# Patient Record
Sex: FEMALE | Race: WHITE | Employment: OTHER | URBAN - METROPOLITAN AREA
[De-identification: names, ages, dates, MRNs, and addresses within clinical notes are randomized per-mention and may not be internally consistent; named-entity substitution may affect disease eponyms.]

---

## 2017-02-24 ENCOUNTER — LAB ONLY (OUTPATIENT)
Dept: INTERNAL MEDICINE CLINIC | Age: 72
End: 2017-02-24

## 2017-02-24 ENCOUNTER — HOSPITAL ENCOUNTER (OUTPATIENT)
Dept: LAB | Age: 72
Discharge: HOME | End: 2017-02-24
Payer: MEDICARE

## 2017-02-24 DIAGNOSIS — E78.5 HYPERLIPIDEMIA LDL GOAL <100: ICD-10-CM

## 2017-02-24 DIAGNOSIS — E78.5 HYPERLIPIDEMIA LDL GOAL <100: Primary | ICD-10-CM

## 2017-02-24 LAB
ALBUMIN SERPL BCP-MCNC: 4 G/DL (ref 3.4–5)
ALBUMIN/GLOB SERPL: 1.3 {RATIO} (ref 0.8–1.7)
ALP SERPL-CCNC: 101 U/L (ref 45–117)
ALT SERPL-CCNC: 31 U/L (ref 13–56)
ANION GAP BLD CALC-SCNC: 6 MMOL/L (ref 3–18)
AST SERPL W P-5'-P-CCNC: 21 U/L (ref 15–37)
BILIRUB SERPL-MCNC: 0.9 MG/DL (ref 0.2–1)
BUN SERPL-MCNC: 23 MG/DL (ref 7–18)
BUN/CREAT SERPL: 29 (ref 12–20)
CALCIUM SERPL-MCNC: 8.7 MG/DL (ref 8.5–10.1)
CHLORIDE SERPL-SCNC: 105 MMOL/L (ref 100–108)
CHOLEST SERPL-MCNC: 177 MG/DL
CO2 SERPL-SCNC: 30 MMOL/L (ref 21–32)
CREAT SERPL-MCNC: 0.8 MG/DL (ref 0.6–1.3)
GLOBULIN SER CALC-MCNC: 3 G/DL (ref 2–4)
GLUCOSE SERPL-MCNC: 78 MG/DL (ref 74–99)
HDLC SERPL-MCNC: 77 MG/DL (ref 40–60)
HDLC SERPL: 2.3 {RATIO} (ref 0–5)
LDLC SERPL CALC-MCNC: 81.8 MG/DL (ref 0–100)
LIPID PROFILE,FLP: ABNORMAL
POTASSIUM SERPL-SCNC: 3.9 MMOL/L (ref 3.5–5.5)
PROT SERPL-MCNC: 7 G/DL (ref 6.4–8.2)
SODIUM SERPL-SCNC: 141 MMOL/L (ref 136–145)
TRIGL SERPL-MCNC: 91 MG/DL (ref ?–150)
VLDLC SERPL CALC-MCNC: 18.2 MG/DL

## 2017-02-24 PROCEDURE — 80053 COMPREHEN METABOLIC PANEL: CPT | Performed by: INTERNAL MEDICINE

## 2017-02-24 PROCEDURE — 80061 LIPID PANEL: CPT | Performed by: INTERNAL MEDICINE

## 2017-02-24 PROCEDURE — 36415 COLL VENOUS BLD VENIPUNCTURE: CPT | Performed by: INTERNAL MEDICINE

## 2017-03-07 ENCOUNTER — OFFICE VISIT (OUTPATIENT)
Dept: INTERNAL MEDICINE CLINIC | Age: 72
End: 2017-03-07

## 2017-03-07 ENCOUNTER — PATIENT OUTREACH (OUTPATIENT)
Dept: INTERNAL MEDICINE CLINIC | Age: 72
End: 2017-03-07

## 2017-03-07 VITALS
DIASTOLIC BLOOD PRESSURE: 70 MMHG | HEIGHT: 61 IN | OXYGEN SATURATION: 98 % | HEART RATE: 73 BPM | TEMPERATURE: 98.5 F | SYSTOLIC BLOOD PRESSURE: 122 MMHG | WEIGHT: 114.4 LBS | BODY MASS INDEX: 21.6 KG/M2 | RESPIRATION RATE: 12 BRPM

## 2017-03-07 DIAGNOSIS — F41.9 ANXIETY: ICD-10-CM

## 2017-03-07 DIAGNOSIS — M85.80 OSTEOPENIA: ICD-10-CM

## 2017-03-07 DIAGNOSIS — E78.5 HYPERLIPIDEMIA LDL GOAL <100: Primary | ICD-10-CM

## 2017-03-07 DIAGNOSIS — Z00.00 MEDICARE ANNUAL WELLNESS VISIT, SUBSEQUENT: ICD-10-CM

## 2017-03-07 DIAGNOSIS — E55.9 HYPOVITAMINOSIS D: ICD-10-CM

## 2017-03-07 DIAGNOSIS — Z13.5 GLAUCOMA SCREENING: ICD-10-CM

## 2017-03-07 DIAGNOSIS — I10 ESSENTIAL HYPERTENSION WITH GOAL BLOOD PRESSURE LESS THAN 140/90: ICD-10-CM

## 2017-03-07 DIAGNOSIS — Z78.9 ADVANCE DIRECTIVE ON FILE: ICD-10-CM

## 2017-03-07 RX ORDER — ALPRAZOLAM 0.25 MG/1
0.25 TABLET ORAL
COMMUNITY
End: 2017-03-07 | Stop reason: SDUPTHER

## 2017-03-07 RX ORDER — ALPRAZOLAM 0.25 MG/1
0.25 TABLET ORAL
Qty: 30 TAB | Refills: 0 | Status: SHIPPED | OUTPATIENT
Start: 2017-03-07 | End: 2017-11-01 | Stop reason: SDUPTHER

## 2017-03-07 NOTE — PROGRESS NOTES
Clarita Blanton,born 1945, is a 67 y.o. female, who is seen today for reevaluation of hyperlipidemia hypertension the patient hypovitaminosis D and osteopenia. She takes her calcium with D regularly and extra D. She takes all of her medicine correctly. She is having no chest pain dyspnea or other complaints. She does note that couple times per month when she feels fine her blood pressure will go up to around 160 and his right back down into the 120 range the next day. She has had a little stress after moving her mother here from [place] to independent living. Past Medical History:   Diagnosis Date    Allergic rhinitis     Cancer ([facility] [address]     colon    Carotid duplex study 02/08/2011    No significant stenosis >49% bilaterally.  Cold     currently with post nasal drip    Headache(784.0)     Hypercholesterolemia     Hypertension     Normal nuclear stress test 01/29/2009    No evidence of scarring or ischemia. EF 78%. No reg WMA. Neg max EST w/good exercise.  Osteopenia      Current Outpatient Prescriptions   Medication Sig Dispense Refill    ALPRAZolam (XANAX) 0.25 mg tablet Take 1 Tab by mouth three (3) times daily as needed for Anxiety. Max Daily Amount: 0.75 mg. 30 Tab 0    ipratropium (ATROVENT) 0.03 % nasal spray USE 2 SPRAYS IN EACH NOSTRIL EVERY 12 HOURS 60 mL 3    atorvastatin (LIPITOR) 10 mg tablet TAKE ONE TABLET BY MOUTH EVERY DAY 90 Tab 3    lisinopril (PRINIVIL, ZESTRIL) 40 mg tablet TAKE 1 TABLET TWICE DAILY 180 Tab 3    polyethylene glycol (MIRALAX) 17 gram/dose powder Take 17 g by mouth daily. 1530 g 3    cholecalciferol, vitamin D3, (VITAMIN D3) 2,000 unit Tab Take 1 Tab by mouth daily.  omega-3 fatty acids-vitamin e (FISH OIL) 1,000 mg Cap Take 1 Cap by mouth daily.  aspirin delayed-release 81 mg tablet Take 81 mg by mouth daily.  MULTIVITS,CA,MINERALS/IRON/FA (MULTI FOR HER PO) Take 1 Tab by mouth daily.       calcium-cholecalciferol, d3, (CALCIUM 600 + D) 600-125 mg-unit Tab Take 1 Tab by mouth daily. Visit Vitals    /70    Pulse 73    Temp 98.5 °F (36.9 °C) (Oral)    Resp 12    Ht 5' 1\" (1.549 m)    Wt 114 lb 6.4 oz (51.9 kg)    SpO2 98%    BMI 21.62 kg/m2     Carotids are 2+ without bruits. Lungs are clear to percussion. Good breath sounds with no wheezing or crackles. Heart reveals a regular rhythm with normal S1 and S2 no murmur gallop click or rub. Apical impulse is not palpable. Abdomen is soft and nontender with no hepatosplenomegaly or masses and no bruits. Extremities reveal no clubbing cyanosis or edema. Pulses are 2+ throughout. Results for orders placed or performed during the hospital encounter of 13/11/[birth year]   METABOLIC PANEL, COMPREHENSIVE   Result Value Ref Range    Sodium 141 136 - 145 mmol/L    Potassium 3.9 3.5 - 5.5 mmol/L    Chloride 105 100 - 108 mmol/L    CO2 30 21 - 32 mmol/L    Anion gap 6 3.0 - 18 mmol/L    Glucose 78 74 - 99 mg/dL    BUN 23 (H) 7.0 - 18 MG/DL    Creatinine 0.80 0.6 - 1.3 MG/DL    BUN/Creatinine ratio 29 (H) 12 - 20      GFR est AA >60 >60 ml/min/1.73m2    GFR est non-AA >60 >60 ml/min/1.73m2    Calcium 8.7 8.5 - 10.1 MG/DL    Bilirubin, total 0.9 0.2 - 1.0 MG/DL    ALT (SGPT) 31 13 - 56 U/L    AST (SGOT) 21 15 - 37 U/L    Alk. phosphatase 101 45 - 117 U/L    Protein, total 7.0 6.4 - 8.2 g/dL    Albumin 4.0 3.4 - 5.0 g/dL    Globulin 3.0 2.0 - 4.0 g/dL    A-G Ratio 1.3 0.8 - 1.7     LIPID PANEL   Result Value Ref Range    LIPID PROFILE          Cholesterol, total 177 <200 MG/DL    Triglyceride 91 <150 MG/DL    HDL Cholesterol 77 (H) 40 - 60 MG/DL    LDL, calculated 81.8 0 - 100 MG/DL    VLDL, calculated 18.2 MG/DL    CHOL/HDL Ratio 2.3 0 - 5.0       Assessment: #1. Hyperlipidemia doing well. She will continue atorvastatin 10 mg daily. #2.   Hypertension well controlled most of the time, she will continue monitoring blood pressure at home and call if blood pressure is high more frequently than now. Will continue lisinopril 40 mg daily. #3.  History of constipation doing well, she will continue polyethylene glycol. #4.  Osteopenia with hypovitaminosis D doing well, she will continue vitamin D plus calcium. Wellness evaluation today and I agree with Becky's note. Follow-up will be in the summer for complete evaluation, anytime after June 14. Bhanu Walton MD FACP    Please note: This document has been produced using voice recognition software. Unrecognized errors in transcription may be present.

## 2017-03-07 NOTE — PROGRESS NOTES
Derrell Velazquez is a 67 y.o. female and presents for annual Medicare Wellness Visit. Reviewed AMD on file. All information up-to-date. Problem List: Reviewed with patient and discussed risk factors.     Patient Active Problem List   Diagnosis Code    Colon cancer 1989 C18.9    Osteopenia M85.80    Carotid artery disease mild I77.9    Microscopic hematuria, Chronic R31.29    Leg pain, bilateral M79.604, M79.605    Reflux K21.9    Radiculitis of leg M54.10    Chronic lumbar radiculopathy M54.16    Lumbar spinal stenosis M48.06    Spondylolisthesis, grade 1 M43.10    Degeneration of lumbar or lumbosacral intervertebral disc M51.37    Advance directive on file Z78.9    Essential hypertension with goal blood pressure less than 140/90 I10    Hyperlipidemia LDL goal <100 E78.5    Allergic rhinitis J30.9    Left arm pain M79.602    Primary insomnia F51.01    Anxiety F41.9       Current medical providers:  Patient Care Team:  Medina Thakkar MD as PCP - General (Internal Medicine)  Cruz Self DO (Cardiology)  Álvaro Gutierrez MD (Pain Management)  Neli Melo MD (Ophthalmology)  Rona Muñoz RN as Ambulatory Care Navigator (Internal Medicine)  Azra Vidal MD (Neurosurgery)  Lb Reyes MD (Surgery)  Donn Caceres, RN as Ambulatory Care Navigator (Internal Medicine)    PSH: Reviewed with patient  Past Surgical History:   Procedure Laterality Date    COLONOSCOPY N/A 6/3/2016    COLONOSCOPY, SURVEILLANCE performed [facility] Lb [facility]yes [facility] at [facility] ENDOSCOPY    ENDOSCOPY, COLON, DIAGNOSTIC      HX BUNIONECTOMY      x 2    HX GI      colon resection    HX GYN      hysterectomy    HX HEENT      tonsillectomy    HX HEMORRHOIDECTOMY      HX HYSTERECTOMY      HX ORTHOPAEDIC  July 2015    lumbar fusion        SH: Reviewed with patient  Social History   Substance Use Topics    Smoking status: Never Smoker    Smokeless tobacco: Never Used    Alcohol use Yes Comment: socially       FH: Reviewed with patient  Family History   Problem Relation Age of Onset    Heart Disease Mother     Hypertension Mother     Heart Surgery Mother      triple bypass    Heart Disease Brother     Heart Attack Brother      massive MI    Hypertension Brother        Medications/Allergies: Reviewed with patient  Current Outpatient Prescriptions on File Prior to Visit   Medication Sig Dispense Refill    ipratropium (ATROVENT) 0.03 % nasal spray USE 2 SPRAYS IN EACH NOSTRIL EVERY 12 HOURS 60 mL 3    atorvastatin (LIPITOR) 10 mg tablet TAKE ONE TABLET BY MOUTH EVERY DAY 90 Tab 3    lisinopril (PRINIVIL, ZESTRIL) 40 mg tablet TAKE 1 TABLET TWICE DAILY 180 Tab 3    polyethylene glycol (MIRALAX) 17 gram/dose powder Take 17 g by mouth daily. 1530 g 3    cholecalciferol, vitamin D3, (VITAMIN D3) 2,000 unit Tab Take 1 Tab by mouth daily.  omega-3 fatty acids-vitamin e (FISH OIL) 1,000 mg Cap Take 1 Cap by mouth daily.  aspirin delayed-release 81 mg tablet Take 81 mg by mouth daily.  MULTIVITS,CA,MINERALS/IRON/FA (MULTI FOR HER PO) Take 1 Tab by mouth daily.  calcium-cholecalciferol, d3, (CALCIUM 600 + D) 600-125 mg-unit Tab Take 1 Tab by mouth daily. No current facility-administered medications on file prior to visit. No Known Allergies    Objective:  Visit Vitals    /70    Pulse 73    Temp 98.5 °F (36.9 °C) (Oral)    Resp 12    Ht 5' 1\" (1.549 m)    Wt 114 lb 6.4 oz (51.9 kg)    SpO2 98%    BMI 21.62 kg/m2    Body mass index is 21.62 kg/(m^2). Assessment of cognitive impairment: Alert and oriented x 3    Depression Screen:   PHQ 2 / 9, over the last two weeks 3/7/2017   Little interest or pleasure in doing things Not at all   Feeling down, depressed or hopeless Not at all   Total Score PHQ 2 0       Fall Risk Assessment:    Fall Risk Assessment, last 12 mths 3/7/2017   Able to walk? Yes   Fall in past 12 months?  No       Functional Ability:   Does the patient exhibit a steady gait? yes   How long did it take the patient to get up and walk from a sitting position? 1 second   Is the patient self reliant?  (ie can do own laundry, meals, household chores)  yes     Does the patient handle his/her own medications? yes     Does the patient handle his/her own money? yes     Is the patients home safe (ie good lighting, handrails on stairs and bath, etc.)? yes     Did you notice or did patient express any hearing difficulties? no     Did you notice or did patient express any vision difficulties? Yes; stated she has cataracts bilaterally. Being followed by Dr. Tracy Louis. Were distance and reading eye charts used?  n/a       Advance Care Planning:   Patient was offered the opportunity to discuss advance care planning:  yes     Does patient have an Advance Directive:  Yes; on file. If no, did you provide information on Caring Connections?  n/a       Plan:      Orders Placed This Encounter    DISCONTD: ALPRAZolam (XANAX) 0.25 mg tablet    ALPRAZolam (XANAX) 0.25 mg tablet       Health Maintenance   Topic Date Due    BREAST CANCER SCRN MAMMOGRAM  12/17/2017    MEDICARE YEARLY EXAM  03/08/2018    GLAUCOMA SCREENING Q2Y  06/01/2018    COLONOSCOPY  06/03/2026    DTaP/Tdap/Td series (2 - Td) 03/07/2027    Hepatitis C Screening  Addressed    OSTEOPOROSIS SCREENING (DEXA)  Completed    ZOSTER VACCINE AGE 60>  Completed    Pneumococcal 65+ High/Highest Risk  Addressed    INFLUENZA AGE 9 TO ADULT  Completed       *Patient verbalized understanding and agreement with the plan. A copy of the After Visit Summary with personalized health plan was given to the patient today.

## 2017-03-07 NOTE — MR AVS SNAPSHOT
Visit Information Date & Time Provider Department Dept. Phone Encounter #  
 3/7/2017 10:00 AM Lisbeth Romo MD Internist of [address] [place] [address] Place [id number] Follow-up Instructions Routing History Your Appointments 6/22/2017  9:55 AM  
LAB with [facility] NURSE VISIT Internist of [facility] Appt Note: lab  
 [address], [address] [facility] [address]  
  
   
 [address], [address]  
  
    
 6/29/2017  9:15 AM  
PHYSICAL with Lisbeth Romo MD  
Internist of [facility]) Appt Note: rpe rm  
 [address] [facility], [facility] [id number] [place] [address] [address]  
  
   
 [address], [address] Upcoming Health Maintenance Date Due  
 BREAST CANCER SCRN MAMMOGRAM 12/17/2017 MEDICARE YEARLY EXAM 3/8/2018 GLAUCOMA SCREENING Q2Y 6/1/2018 COLONOSCOPY 6/3/2026 DTaP/Tdap/Td series (2 - Td) 3/7/2027 Allergies as of 3/7/2017  Review Complete On: 3/7/2017 By: Lisbeth Romo MD  
 No Known Allergies Current Immunizations  Reviewed on 11/1/2016 Name Date Influenza High Dose Vaccine PF 11/1/2016 11:12 AM, 10/23/2015, 9/23/2014  9:19 AM  
 Influenza Vaccine PF 10/10/2013 11:49 AM  
 Influenza Vaccine Split 10/17/2012 Pneumococcal Conjugate (PCV-13) 4/8/2015 Pneumococcal Vaccine (Unspecified Type) 1/1/2009 Td 1/1/2009 Zoster Vaccine, Live 1/1/2011 Not reviewed this visit You Were Diagnosed With   
  
 Codes Comments Hyperlipidemia LDL goal <100    -  Primary ICD-10-CM: E78.5 ICD-9-CM: 272.4 Medicare annual wellness visit, subsequent     ICD-10-CM: Z00.00 ICD-9-CM: V70.0 Advance directive on file     ICD-10-CM: Z78.9 ICD-9-CM: V49.89 Essential hypertension with goal blood pressure less than 140/90     ICD-10-CM: I10 
ICD-9-CM: 401.9 Anxiety     ICD-10-CM: F41.9 ICD-9-CM: 300.00 Osteopenia     ICD-10-CM: M85.80 ICD-9-CM: 733.90 Hypovitaminosis D     ICD-10-CM: E55.9 ICD-9-CM: 268.9 Vitals BP Pulse Temp Resp Height(growth percentile) Weight(growth percentile) 122/70 73 98.5 °F (36.9 °C) (Oral) 12 5' 1\" (1.549 m) 114 lb 6.4 oz (51.9 kg) SpO2 BMI OB Status Smoking Status 98% 21.62 kg/m2 Hysterectomy Never Smoker Vitals History BMI and BSA Data Body Mass Index Body Surface Area  
 21.62 kg/m 2 1.49 m 2 Preferred Pharmacy Pharmacy Name Phone [facility] [address] [place] [address] [facility] [address] [phone] Your Updated Medication List  
  
   
This list is accurate as of: 3/7/17 10:38 AM.  Always use your most recent med list.  
  
  
  
  
 ALPRAZolam 0.25 mg tablet Commonly known as:  Cruz January Take 1 Tab by mouth three (3) times daily as needed for Anxiety. Max Daily Amount: 0.75 mg.  
  
 aspirin delayed-release 81 mg tablet Take 81 mg by mouth daily. atorvastatin 10 mg tablet Commonly known as:  LIPITOR  
TAKE ONE TABLET BY MOUTH EVERY DAY  
  
 CALCIUM 600 + D 600-125 mg-unit Tab Generic drug:  calcium-cholecalciferol (d3) Take 1 Tab by mouth daily. FISH OIL 1,000 mg Cap Generic drug:  omega-3 fatty acids-vitamin e Take 1 Cap by mouth daily. ipratropium 0.03 % nasal spray Commonly known as:  ATROVENT  
USE 2 SPRAYS IN EACH NOSTRIL EVERY 12 HOURS  
  
 lisinopril 40 mg tablet Commonly known as:  PRINIVIL, ZESTRIL  
TAKE 1 TABLET TWICE DAILY MULTI FOR HER PO Take 1 Tab by mouth daily. polyethylene glycol 17 gram/dose powder Commonly known as:  Scot Mirlande Take 17 g by mouth daily. VITAMIN D3 2,000 unit Tab Generic drug:  cholecalciferol (vitamin D3) Take 1 Tab by mouth daily. Prescriptions Printed Refills  ALPRAZolam (XANAX) 0.25 mg tablet 0  
 Sig: Take 1 Tab by mouth three (3) times daily as needed for Anxiety. Max Daily Amount: 0.75 mg. Class: Print Route: Oral  
  
To-Do List   
 Around 06/08/2017 Lab:  CBC WITH AUTOMATED DIFF Around 06/08/2017 Lab:  LIPID PANEL Around 06/08/2017 Lab:  METABOLIC PANEL, COMPREHENSIVE Around 06/08/2017 Lab:  TSH 3RD GENERATION Around 06/08/2017 Lab:  URINALYSIS W/ RFLX MICROSCOPIC Around 06/08/2017 Lab:  VITAMIN D, 25 HYDROXY Patient Instructions Medicare Part B Preventive Services Limitations Recommendation Scheduled Bone Mass Measurement 
(age 72 & older, biennial) Requires diagnosis related to osteoporosis or estrogen deficiency. Biennial benefit unless patient has history of long-term glucocorticoid tx or baseline is needed because initial test was by other method Every 2 years or more frequently if medically necessary. Done: 
5/6/2014 Cardiovascular Screening Blood Tests (every 5 years) Total cholesterol, HDL, Triglycerides Order as a panel if possible Every 5 years. Done: 
2/24/2017 Colorectal Cancer Screening 
-Fecal occult blood test (annual) -Flexible sigmoidoscopy (5y) 
-Screening colonoscopy (10y) -Barium Enema Colorectal Cancer Screening (Ages 54-65 yrs old)  For all patients 48 and older: 
-Annual fecal occult blood test or 
colonoscopy every 10 years or 
-Flexible sigmoidoscopy every 5 years or 
-lower endoscopy to be performed more frequently, if advised by GI. Done: 
6/3/2016 Counseling to Prevent Tobacco Use (up to 8 sessions per year) - Counseling greater than 3 and up to 10 minutes - Counseling greater than 10 minutes Patients must be asymptomatic of tobacco-related conditions to receive as preventive service Two cessation counseling attempts (up to 8 counseling sessions) per year. N/A Diabetes Screening Tests (at least every 3 years, Medicare covers annually or at 6-month intervals for prediabetic patients) Fasting blood sugar (FBS) or glucose tolerance test (GTT) Patient must be diagnosed with one of the following: 
-Hypertension, Dyslipidemia, obesity, previous impaired FBS or GTT 
Or any two of the following: overweight, FH of diabetes, age ? 72, history of gestational diabetes, birth of baby weighing more than 9 pounds Annually or every 6 months if previous diagnosis of elevated FBS, elevated HbA1c, or impaired GTT, or glucosuria. Done: 
2/24/2017 Diabetes Self-Management Training (DSMT) (no USPSTF recommendation) Requires referral by treating physician for patient with diabetes or renal disease. 10 hours of initial DSMT session of no less than 30 minutes each in a continuous 12-month period. 2 hours of follow-up DSMT in subsequent years. Up to 10 hours of initial training within a continuous 12 month period of subsequent years: up to 2 hours of follow-up training each year after the initial year. N/A Glaucoma Screening (no USPSTF recommendation) Diabetes mellitus, family history, , age 48 or over,  American, age 72 or over Annually for covered beneficiaries. Sees Dr. Jose Ramos. Will request last eye exam. 
 
 
  
Human Immunodeficiency Virus (HIV) Screening (annually for increased risk patients) HIV-1 and HIV-2 by EIA, SHARLA, rapid antibody test, or oral mucosa transudate Patient must be at increased risk for HIV infection per USPSTF guidelines or pregnant. Tests covered annually for patients at increased risk. Pregnant patients may receive up to 3 test during pregnancy. Annually for beneficiaries at increased risk, including anyone who asks for the test. Not at risk Medical Nutrition Therapy (MNT) (for diabetes or renal disease not recommended schedule) Requires referral by treating physician for patient with diabetes or renal disease.   Can be provided in same year as diabetes self-management training (DSMT), and CMS recommends medical nutrition therapy take place after DSMT. Up to 3 hours for initial year and 2 hours in subsequent years. First year: 3 hours of one-on-one counseling or subsequent years: 2 hours. N/A Prostate Cancer Screening (annually up to age 76) - Digital rectal exam (AMADO) - Prostate specific antigen (PSA) Annually (age 48 or over), AMADO not paid separately when covered E/M service is provided on same date Once every 12 months for patients age older than 48years of age includes: digital rectal exam and/or prostate specific antigen test. N/A Seasonal Influenza Vaccination (annually)  Once per fall or winter season. Done: 
11/1/2016 Pneumococcal Vaccination (once after 72)  Once after age 72 and if more than 5 years since last vaccination and/or uncertainty of vaccine status. Pneumococcal: 
1/1/2009 Prevnar 13: 
4/8/2015 Hepatitis B Vaccinations (if medium/high risk) Medium/high risk factors:  End-stage renal disease, Hemophiliacs who received Factor VIII or IX concentrates, Clients of institutions for the mentally retarded, Persons who live in the same house as a HepB virus carrier, Homosexual men, Illicit injectable drug abusers. Schedule course of vaccines if patient not previously vaccinated *additional shots if medically necessary. Not at risk Screening Mammography (biennial age 54-69)? Annually (age 36 or over) Age 28 through 44: one baseline or aged 36 and older: annually. Done: 
12/17/2015 Screening Pap Tests and Pelvic Examination (up to age 79 and after 79 if unknown history or abnormal study last 10 years) Every 24 months except high risk Annually if at dory risk for developing cervical or vaginal cancer, or childbearing, age with abnormal Pap test within past 3 years or every 2 years for women at normal risk. N/A Ultrasound Screening for Abdominal Aortic Aneurysm (AAA) (once) Patient must be referred through [facility] and not have had a screening for abdominal aortic aneurysm before under Medicare. Limited to patients who meet one of the following criteria: 
- Men who are 73-68 years old and have smoked more than 100 cigarettes in their lifetime. 
-Anyone with a FH of AAA 
-Anyone recommended for screening by USPSTF Once in a lifetime. N/A Schedule of Personalized Health Plan (Provide Copy to Patient) The best way to stay healthy is to live a healthy lifestyle. A healthy lifestyle includes regular exercise, eating a well-balanced diet, keeping a healthy weight and not smoking. Regular physical exams and screening tests are another important way to take care of yourself. Preventive exams provided by health care providers can find health problems early when treatment works best and can keep you from getting certain diseases or illnesses. Preventive services include exams, lab tests, screenings, shots, monitoring and information to help you take care of your own health. All people over 65 should have a pneumonia shot. Pneumonia shots are usually only needed once in a lifetime unless your doctor decides differently. All people over 65 should have a yearly flu shot. People over 65 are at medium to high risk for Hepatitis B. Three shots are needed for complete protection. In addition to your physical exam, some screening tests are recommended: 
 
Bone mass measurement (dexa scan) is recommended every two years Diabetes Mellitus screening is recommended every year. Glaucoma is an eye disease caused by high pressure in the eye. An eye exam is recommended every year. Cardiovascular screening tests that check your cholesterol and other blood fat (lipid) levels are recommended every five years.   
 
Colorectal Cancer screening tests help to find pre-cancerous polyps (growths in the colon) so they can be removed before they turn into cancer. Tests ordered for screening depend on your personal and family history risk factors. Screening for Breast Cancer is recommended yearly with a mammogram. 
 
Screening for Cervical Cancer is recommended every two years (annually for certain risk factors, such as previous history of STD or abnormal PAP in past 7 years), with a Pelvic Exam with PAP Here is a list of your current Health Maintenance items with a due date: 
Health Maintenance Topic Date Due  
 BREAST CANCER SCRN MAMMOGRAM  12/17/2017  MEDICARE YEARLY EXAM  03/08/2018  GLAUCOMA SCREENING Q2Y  06/01/2018  COLONOSCOPY  06/03/2026  
 DTaP/Tdap/Td series (2 - Td) 03/07/2027  Hepatitis C Screening  Addressed  OSTEOPOROSIS SCREENING (DEXA)  Completed  ZOSTER VACCINE AGE 60>  Completed  Pneumococcal 65+ High/Highest Risk  Addressed  INFLUENZA AGE 9 TO ADULT  Completed Introducing [facility] SERVICES! Dear Jimmie Reed: 
Thank you for requesting a [employer] account. Our records indicate that you already have an active [employer] account. You can access your account anytime at [email or web address] Did you know that you can access your hospital and ER discharge instructions at any time in [employer]? You can also review all of your test results from your hospital stay or ER visit. Additional Information If you have questions, please visit the Frequently Asked Questions section of the [employer] website at [email or web address] Remember, [employer] is NOT to be used for urgent needs. For medical emergencies, dial 911. Now available from your iPhone and Android! Please provide this summary of care documentation to your next provider. Your primary care clinician is listed as [place] Isaias Garcia. If you have any questions after today's visit, please call [phone].

## 2017-03-07 NOTE — PROGRESS NOTES
Contacted Dr. Chinmay Finn office to request most recent glaucoma screening. Spoke to [facility] and the [place]. Introduced self and reason for call. Provided 2 patient identifiers. Last glaucoma screening was in 2014. Annual exam scheduled for  3/20/17. Will call after that time request glaucoma screening.

## 2017-03-07 NOTE — PATIENT INSTRUCTIONS
Medicare Part B Preventive Services Limitations Recommendation Scheduled   Bone Mass Measurement  (age 72 & older, biennial) Requires diagnosis related to osteoporosis or estrogen deficiency. Biennial benefit unless patient has history of long-term glucocorticoid tx or baseline is needed because initial test was by other method Every 2 years or more frequently if medically necessary. Done:  5/6/2014         Cardiovascular Screening Blood Tests (every 5 years)  Total cholesterol, HDL, Triglycerides Order as a panel if possible Every 5 years. Done:  2/24/2017         Colorectal Cancer Screening  -Fecal occult blood test (annual)  -Flexible sigmoidoscopy (5y)  -Screening colonoscopy (10y)  -Barium Enema Colorectal Cancer Screening (Ages 54-65 yrs old)  For all patients 48 and older:  -Annual fecal occult blood test or  colonoscopy every 10 years or  -Flexible sigmoidoscopy every 5 years or  -lower endoscopy to be performed more frequently, if advised by GI. Done:  6/3/2016         Counseling to Prevent Tobacco Use (up to 8 sessions per year)  - Counseling greater than 3 and up to 10 minutes  - Counseling greater than 10 minutes Patients must be asymptomatic of tobacco-related conditions to receive as preventive service Two cessation counseling attempts (up to 8 counseling sessions) per year. N/A   Diabetes Screening Tests (at least every 3 years, Medicare covers annually or at 6-month intervals for prediabetic patients)    Fasting blood sugar (FBS) or glucose tolerance test (GTT) Patient must be diagnosed with one of the following:  -Hypertension, Dyslipidemia, obesity, previous impaired FBS or GTT  Or any two of the following: overweight, FH of diabetes, age ? 72, history of gestational diabetes, birth of baby weighing more than 9 pounds Annually or every 6 months if previous diagnosis of elevated FBS, elevated HbA1c, or impaired GTT, or glucosuria.  Done:  2/24/2017         Diabetes Self-Management Training (DSMT) (no USPSTF recommendation) Requires referral by treating physician for patient with diabetes or renal disease. 10 hours of initial DSMT session of no less than 30 minutes each in a continuous 12-month period. 2 hours of follow-up DSMT in subsequent years. Up to 10 hours of initial training within a continuous 12 month period of subsequent years: up to 2 hours of follow-up training each year after the initial year. N/A   Glaucoma Screening (no USPSTF recommendation) Diabetes mellitus, family history, , age 48 or over,  American, age 72 or over Annually for covered beneficiaries. Sees Dr. Norah Duncan. Will request last eye exam.         Human Immunodeficiency Virus (HIV) Screening (annually for increased risk patients)  HIV-1 and HIV-2 by EIA, SHARLA, rapid antibody test, or oral mucosa transudate Patient must be at increased risk for HIV infection per USPSTF guidelines or pregnant. Tests covered annually for patients at increased risk. Pregnant patients may receive up to 3 test during pregnancy. Annually for beneficiaries at increased risk, including anyone who asks for the test. Not at risk   Medical Nutrition Therapy (MNT) (for diabetes or renal disease not recommended schedule) Requires referral by treating physician for patient with diabetes or renal disease. Can be provided in same year as diabetes self-management training (DSMT), and CMS recommends medical nutrition therapy take place after DSMT. Up to 3 hours for initial year and 2 hours in subsequent years. First year: 3 hours of one-on-one counseling or subsequent years: 2 hours.  N/A   Prostate Cancer Screening (annually up to age 76)  - Digital rectal exam (AMADO)  - Prostate specific antigen (PSA) Annually (age 48 or over), AMDAO not paid separately when covered E/M service is provided on same date Once every 12 months for patients age older than 48years of age includes: digital rectal exam and/or prostate specific antigen test. N/A         Seasonal Influenza Vaccination (annually)  Once per fall or winter season. Done:  11/1/2016           Pneumococcal Vaccination (once after 72)  Once after age 72 and if more than 5 years since last vaccination and/or uncertainty of vaccine status. Pneumococcal:  1/1/2009    Prevnar 13:  4/8/2015   Hepatitis B Vaccinations (if medium/high risk) Medium/high risk factors:  End-stage renal disease,  Hemophiliacs who received Factor VIII or IX concentrates, Clients of institutions for the mentally retarded, Persons who live in the same house as a HepB virus carrier, Homosexual men, Illicit injectable drug abusers. Schedule course of vaccines if patient not previously vaccinated  *additional shots if medically necessary. Not at risk   Screening Mammography (biennial age 54-69)? Annually (age 36 or over) Age 28 through 44: one baseline or aged 36 and older: annually. Done:  12/17/2015         Screening Pap Tests and Pelvic Examination (up to age 79 and after 79 if unknown history or abnormal study last 10 years) Every 24 months except high risk Annually if at dory risk for developing cervical or vaginal cancer, or childbearing, age with abnormal Pap test within past 3 years or every 2 years for women at normal risk. N/A           Ultrasound Screening for Abdominal Aortic Aneurysm (AAA) (once) Patient must be referred through IPPE and not have had a screening for abdominal aortic aneurysm before under Medicare. Limited to patients who meet one of the following criteria:  - Men who are 73-68 years old and have smoked more than 100 cigarettes in their lifetime.  -Anyone with a FH of AAA  -Anyone recommended for screening by USPSTF Once in a lifetime. N/A           Schedule of Personalized Health Plan  (Provide Copy to Patient)  The best way to stay healthy is to live a healthy lifestyle.  A healthy lifestyle includes regular exercise, eating a well-balanced diet, keeping a healthy weight and not smoking. Regular physical exams and screening tests are another important way to take care of yourself. Preventive exams provided by health care providers can find health problems early when treatment works best and can keep you from getting certain diseases or illnesses. Preventive services include exams, lab tests, screenings, shots, monitoring and information to help you take care of your own health. All people over 65 should have a pneumonia shot. Pneumonia shots are usually only needed once in a lifetime unless your doctor decides differently. All people over 65 should have a yearly flu shot. People over 65 are at medium to high risk for Hepatitis B. Three shots are needed for complete protection. In addition to your physical exam, some screening tests are recommended:    Bone mass measurement (dexa scan) is recommended every two years  Diabetes Mellitus screening is recommended every year. Glaucoma is an eye disease caused by high pressure in the eye. An eye exam is recommended every year. Cardiovascular screening tests that check your cholesterol and other blood fat (lipid) levels are recommended every five years. Colorectal Cancer screening tests help to find pre-cancerous polyps (growths in the colon) so they can be removed before they turn into cancer. Tests ordered for screening depend on your personal and family history risk factors.     Screening for Breast Cancer is recommended yearly with a mammogram.    Screening for Cervical Cancer is recommended every two years (annually for certain risk factors, such as previous history of STD or abnormal PAP in past 7 years), with a Pelvic Exam with PAP    Here is a list of your current Health Maintenance items with a due date:  Health Maintenance   Topic Date Due    BREAST CANCER SCRN MAMMOGRAM  12/17/2017    MEDICARE YEARLY EXAM  03/08/2018    GLAUCOMA SCREENING Q2Y  06/01/2018    COLONOSCOPY  06/03/2026    DTaP/Tdap/Td series (2 - Td) 03/07/2027    Hepatitis C Screening  Addressed    OSTEOPOROSIS SCREENING (DEXA)  Completed    ZOSTER VACCINE AGE 60>  Completed    Pneumococcal 65+ High/Highest Risk  Addressed    INFLUENZA AGE 9 TO ADULT  Completed

## 2017-03-22 ENCOUNTER — PATIENT OUTREACH (OUTPATIENT)
Dept: INTERNAL MEDICINE CLINIC | Age: 72
End: 2017-03-22

## 2017-03-22 NOTE — PROGRESS NOTES
Second Attempt   Contacted Dr. Harleen Sheridan office to request most recent glaucoma screening. Introduced self and reason for call. Provided 2 patient identifiers. Provided office fax number.

## 2017-04-20 ENCOUNTER — PATIENT OUTREACH (OUTPATIENT)
Dept: INTERNAL MEDICINE CLINIC | Age: 72
End: 2017-04-20

## 2017-04-20 NOTE — PROGRESS NOTES
Josué Bonilla office to request most recent glaucoma screening. Spoke to [place]. Introduced self and reason for call. Provided 2 patient identifiers. Last seen 8/2/16. Provided office fax number.

## 2017-05-02 ENCOUNTER — HOSPITAL ENCOUNTER (OUTPATIENT)
Dept: MAMMOGRAPHY | Age: 72
Discharge: HOME | End: 2017-05-02
Attending: INTERNAL MEDICINE
Payer: MEDICARE

## 2017-05-02 DIAGNOSIS — Z12.31 VISIT FOR SCREENING MAMMOGRAM: ICD-10-CM

## 2017-05-02 PROCEDURE — 77063 BREAST TOMOSYNTHESIS BI: CPT

## 2017-05-23 ENCOUNTER — PATIENT OUTREACH (OUTPATIENT)
Dept: INTERNAL MEDICINE CLINIC | Age: 72
End: 2017-05-23

## 2017-06-08 DIAGNOSIS — E55.9 HYPOVITAMINOSIS D: ICD-10-CM

## 2017-06-08 DIAGNOSIS — I10 ESSENTIAL HYPERTENSION WITH GOAL BLOOD PRESSURE LESS THAN 140/90: ICD-10-CM

## 2017-06-08 DIAGNOSIS — E78.5 HYPERLIPIDEMIA LDL GOAL <100: ICD-10-CM

## 2017-06-22 ENCOUNTER — HOSPITAL ENCOUNTER (OUTPATIENT)
Dept: LAB | Age: 72
Discharge: HOME | End: 2017-06-22
Payer: MEDICARE

## 2017-06-22 ENCOUNTER — LAB ONLY (OUTPATIENT)
Dept: INTERNAL MEDICINE CLINIC | Age: 72
End: 2017-06-22

## 2017-06-22 DIAGNOSIS — I10 ESSENTIAL HYPERTENSION WITH GOAL BLOOD PRESSURE LESS THAN 140/90: Primary | ICD-10-CM

## 2017-06-22 DIAGNOSIS — E78.5 HYPERLIPIDEMIA LDL GOAL <100: ICD-10-CM

## 2017-06-22 DIAGNOSIS — E55.9 HYPOVITAMINOSIS D: ICD-10-CM

## 2017-06-22 DIAGNOSIS — I10 ESSENTIAL HYPERTENSION WITH GOAL BLOOD PRESSURE LESS THAN 140/90: ICD-10-CM

## 2017-06-22 LAB
ALBUMIN SERPL BCP-MCNC: 4.3 G/DL (ref 3.4–5)
ALBUMIN/GLOB SERPL: 1.5 {RATIO} (ref 0.8–1.7)
ALP SERPL-CCNC: 89 U/L (ref 45–117)
ALT SERPL-CCNC: 25 U/L (ref 13–56)
ANION GAP BLD CALC-SCNC: 9 MMOL/L (ref 3–18)
APPEARANCE UR: CLEAR
AST SERPL W P-5'-P-CCNC: 22 U/L (ref 15–37)
BASOPHILS # BLD AUTO: 0 K/UL (ref 0–0.06)
BASOPHILS # BLD: 1 % (ref 0–2)
BILIRUB SERPL-MCNC: 0.8 MG/DL (ref 0.2–1)
BILIRUB UR QL: NEGATIVE
BUN SERPL-MCNC: 26 MG/DL (ref 7–18)
BUN/CREAT SERPL: 28 (ref 12–20)
CALCIUM SERPL-MCNC: 8.9 MG/DL (ref 8.5–10.1)
CHLORIDE SERPL-SCNC: 106 MMOL/L (ref 100–108)
CHOLEST SERPL-MCNC: 192 MG/DL
CO2 SERPL-SCNC: 25 MMOL/L (ref 21–32)
COLOR UR: YELLOW
CREAT SERPL-MCNC: 0.94 MG/DL (ref 0.6–1.3)
DIFFERENTIAL METHOD BLD: ABNORMAL
EOSINOPHIL # BLD: 0.1 K/UL (ref 0–0.4)
EOSINOPHIL NFR BLD: 2 % (ref 0–5)
ERYTHROCYTE [DISTWIDTH] IN BLOOD BY AUTOMATED COUNT: 14.2 % (ref 11.6–14.5)
GLOBULIN SER CALC-MCNC: 2.8 G/DL (ref 2–4)
GLUCOSE SERPL-MCNC: 84 MG/DL (ref 74–99)
GLUCOSE UR STRIP.AUTO-MCNC: NEGATIVE MG/DL
HCT VFR BLD AUTO: 35.8 % (ref 35–45)
HDLC SERPL-MCNC: 75 MG/DL (ref 40–60)
HDLC SERPL: 2.6 {RATIO} (ref 0–5)
HGB BLD-MCNC: 11.3 G/DL (ref 12–16)
HGB UR QL STRIP: NEGATIVE
KETONES UR QL STRIP.AUTO: NEGATIVE MG/DL
LDLC SERPL CALC-MCNC: 103.8 MG/DL (ref 0–100)
LEUKOCYTE ESTERASE UR QL STRIP.AUTO: NEGATIVE
LIPID PROFILE,FLP: ABNORMAL
LYMPHOCYTES # BLD AUTO: 28 % (ref 21–52)
LYMPHOCYTES # BLD: 1.5 K/UL (ref 0.9–3.6)
MCH RBC QN AUTO: 28.3 PG (ref 24–34)
MCHC RBC AUTO-ENTMCNC: 31.6 G/DL (ref 31–37)
MCV RBC AUTO: 89.5 FL (ref 74–97)
MONOCYTES # BLD: 0.4 K/UL (ref 0.05–1.2)
MONOCYTES NFR BLD AUTO: 8 % (ref 3–10)
NEUTS SEG # BLD: 3.3 K/UL (ref 1.8–8)
NEUTS SEG NFR BLD AUTO: 61 % (ref 40–73)
NITRITE UR QL STRIP.AUTO: NEGATIVE
PH UR STRIP: 5.5 [PH] (ref 5–8)
PLATELET # BLD AUTO: 201 K/UL (ref 135–420)
PMV BLD AUTO: 10.8 FL (ref 9.2–11.8)
POTASSIUM SERPL-SCNC: 4.4 MMOL/L (ref 3.5–5.5)
PROT SERPL-MCNC: 7.1 G/DL (ref 6.4–8.2)
PROT UR STRIP-MCNC: NEGATIVE MG/DL
RBC # BLD AUTO: 4 M/UL (ref 4.2–5.3)
SODIUM SERPL-SCNC: 140 MMOL/L (ref 136–145)
SP GR UR REFRACTOMETRY: 1.03 (ref 1–1.03)
TRIGL SERPL-MCNC: 66 MG/DL (ref ?–150)
TSH SERPL DL<=0.05 MIU/L-ACNC: 1.3 UIU/ML (ref 0.36–3.74)
UROBILINOGEN UR QL STRIP.AUTO: 0.2 EU/DL (ref 0.2–1)
VLDLC SERPL CALC-MCNC: 13.2 MG/DL
WBC # BLD AUTO: 5.3 K/UL (ref 4.6–13.2)

## 2017-06-22 PROCEDURE — 85025 COMPLETE CBC W/AUTO DIFF WBC: CPT | Performed by: INTERNAL MEDICINE

## 2017-06-22 PROCEDURE — 82306 VITAMIN D 25 HYDROXY: CPT | Performed by: INTERNAL MEDICINE

## 2017-06-22 PROCEDURE — 84443 ASSAY THYROID STIM HORMONE: CPT | Performed by: INTERNAL MEDICINE

## 2017-06-22 PROCEDURE — 81003 URINALYSIS AUTO W/O SCOPE: CPT | Performed by: INTERNAL MEDICINE

## 2017-06-22 PROCEDURE — 80053 COMPREHEN METABOLIC PANEL: CPT | Performed by: INTERNAL MEDICINE

## 2017-06-22 PROCEDURE — 80061 LIPID PANEL: CPT | Performed by: INTERNAL MEDICINE

## 2017-06-22 PROCEDURE — 36415 COLL VENOUS BLD VENIPUNCTURE: CPT | Performed by: INTERNAL MEDICINE

## 2017-06-23 LAB — 25(OH)D3 SERPL-MCNC: 35.1 NG/ML (ref 30–100)

## 2017-06-29 ENCOUNTER — OFFICE VISIT (OUTPATIENT)
Dept: INTERNAL MEDICINE CLINIC | Age: 72
End: 2017-06-29

## 2017-06-29 VITALS
HEIGHT: 61 IN | OXYGEN SATURATION: 96 % | SYSTOLIC BLOOD PRESSURE: 124 MMHG | RESPIRATION RATE: 12 BRPM | TEMPERATURE: 98.2 F | HEART RATE: 74 BPM | WEIGHT: 116.2 LBS | BODY MASS INDEX: 21.94 KG/M2 | DIASTOLIC BLOOD PRESSURE: 68 MMHG

## 2017-06-29 DIAGNOSIS — E78.5 HYPERLIPIDEMIA LDL GOAL <100: ICD-10-CM

## 2017-06-29 DIAGNOSIS — I10 ESSENTIAL HYPERTENSION WITH GOAL BLOOD PRESSURE LESS THAN 140/90: Primary | ICD-10-CM

## 2017-06-29 DIAGNOSIS — F41.9 ANXIETY: ICD-10-CM

## 2017-06-29 RX ORDER — LISINOPRIL 40 MG/1
TABLET ORAL
Qty: 180 TAB | Refills: 3 | Status: SHIPPED | OUTPATIENT
Start: 2017-06-29 | End: 2018-03-20 | Stop reason: ALTCHOICE

## 2017-06-29 RX ORDER — IPRATROPIUM BROMIDE 21 UG/1
SPRAY, METERED NASAL
Qty: 60 ML | Refills: 3 | Status: SHIPPED | OUTPATIENT
Start: 2017-06-29 | End: 2018-07-12 | Stop reason: SDUPTHER

## 2017-06-29 NOTE — PROGRESS NOTES
Clarita Blanton,born 1945, is a 67 y.o. female, who is seen today for reevaluation of hypertension hyperlipidemia anxiety and rhinitis. She feels pretty well but moved her mother here from [place] recently to independent living but the patient has to do all of her errands for her and spends a lot of time helping her, babysitting twice a week and looking after her . She finds it somewhat stressful but does use alprazolam occasionally. She takes all of her medicine correctly. Her nasal congestion and drainage are doing quite well with current spray. Her only other symptom is that she fairly frequently gets cramps in her foot at night and she does get up and walk around until it clears up. Past Medical History:   Diagnosis Date    Allergic rhinitis     Cancer ([address]     colon    Carotid duplex study 02/08/2011    No significant stenosis >49% bilaterally.  Cold     currently with post nasal drip    Headache     Hypercholesterolemia     Hypertension     Normal nuclear stress test 01/29/2009    No evidence of scarring or ischemia. EF 78%. No reg WMA. Neg max EST w/good exercise.  Osteopenia      Past Surgical History:   Procedure Laterality Date    COLONOSCOPY N/A 6/3/2016    COLONOSCOPY, SURVEILLANCE performed by [facility] at [facility] ENDOSCOPY    ENDOSCOPY, COLON, DIAGNOSTIC      HX BUNIONECTOMY      x 2    HX GI      colon resection    HX GYN      hysterectomy    HX HEENT      tonsillectomy    HX HEMORRHOIDECTOMY      HX HYSTERECTOMY      HX ORTHOPAEDIC  July 2015    lumbar fusion     Current Outpatient Prescriptions   Medication Sig Dispense Refill    ipratropium (ATROVENT) 0.03 % nasal spray USE 2 SPRAYS IN EACH NOSTRIL EVERY 12 HOURS 60 mL 3    lisinopril (PRINIVIL, ZESTRIL) 40 mg tablet TAKE 1 TABLET TWICE DAILY 180 Tab 3    ALPRAZolam (XANAX) 0.25 mg tablet Take 1 Tab by mouth three (3) times daily as needed for Anxiety.  Max Daily Amount: 0.75 mg. 30 Tab 0  atorvastatin (LIPITOR) 10 mg tablet TAKE ONE TABLET BY MOUTH EVERY DAY 90 Tab 3    polyethylene glycol (MIRALAX) 17 gram/dose powder Take 17 g by mouth daily. 1530 g 3    cholecalciferol, vitamin D3, (VITAMIN D3) 2,000 unit Tab Take 1 Tab by mouth daily.  omega-3 fatty acids-vitamin e (FISH OIL) 1,000 mg Cap Take 1 Cap by mouth daily.  aspirin delayed-release 81 mg tablet Take 81 mg by mouth daily.  MULTIVITS,CA,MINERALS/IRON/FA (MULTI FOR HER PO) Take 1 Tab by mouth daily.  calcium-cholecalciferol, d3, (CALCIUM 600 + D) 600-125 mg-unit Tab Take 1 Tab by mouth daily. No Known Allergies  Social History     Social History    Marital status:      Spouse name: N/A    Number of children: N/A    Years of education: N/A     Social History Main Topics    Smoking status: Never Smoker    Smokeless tobacco: Never Used    Alcohol use Yes      Comment: socially    Drug use: No    Sexual activity: Not Asked     Other Topics Concern    None     Social History Narrative     Visit Vitals    /68    Pulse 74    Temp 98.2 °F (36.8 °C) (Oral)    Resp 12    Ht 5' 1\" (1.549 m)    Wt 116 lb 3.2 oz (52.7 kg)    SpO2 96%    BMI 21.96 kg/m2     The patient is a well-developed well-nourished female in no apparent distress. HEENT: Pupils are equal and react to light and extraocular movements are full. Ear canals and tympanic membranes appear normal. Oral cavity appears normal with no oral lesions. Neck: Carotids are 2+ without bruits. No adenopathy or thyromegaly. Lungs are clear to percussion. I hear no wheezing, rales or rhonchi. Heart reveals a regular rhythm with no murmur, gallop, click or rub. The apical impulse is in the fifth interspace at the midclavicular line. Abdomen is soft and nontender with no hepatosplenomegaly or masses. Bowel sounds are normoactive and there is no distention or tympany. Extremities reveal no clubbing cyanosis or edema. Pulses are 2+.  Skin reveals no suspicious skin growths. Breasts reveal no masses, skin or nipple abnormalities. No axillary adenopathy. Results for orders placed or performed during the hospital encounter of 06/22/17   CBC WITH AUTOMATED DIFF   Result Value Ref Range    WBC 5.3 4.6 - 13.2 K/uL    RBC 4.00 (L) 4.20 - 5.30 M/uL    HGB 11.3 (L) 12.0 - 16.0 g/dL    HCT 35.8 35.0 - 45.0 %    MCV 89.5 74.0 - 97.0 FL    MCH 28.3 24.0 - 34.0 PG    MCHC 31.6 31.0 - 37.0 g/dL    RDW 14.2 11.6 - 14.5 %    PLATELET 313 628 - 700 K/uL    MPV 10.8 9.2 - 11.8 FL    NEUTROPHILS 61 40 - 73 %    LYMPHOCYTES 28 21 - 52 %    MONOCYTES 8 3 - 10 %    EOSINOPHILS 2 0 - 5 %    BASOPHILS 1 0 - 2 %    ABS. NEUTROPHILS 3.3 1.8 - 8.0 K/UL    ABS. LYMPHOCYTES 1.5 0.9 - 3.6 K/UL    ABS. MONOCYTES 0.4 0.05 - 1.2 K/UL    ABS. EOSINOPHILS 0.1 0.0 - 0.4 K/UL    ABS. BASOPHILS 0.0 0.0 - 0.06 K/UL    DF AUTOMATED     LIPID PANEL   Result Value Ref Range    LIPID PROFILE          Cholesterol, total 192 <200 MG/DL    Triglyceride 66 <150 MG/DL    HDL Cholesterol 75 (H) 40 - 60 MG/DL    LDL, calculated 103.8 (H) 0 - 100 MG/DL    VLDL, calculated 13.2 MG/DL    CHOL/HDL Ratio 2.6 0 - 5.0     METABOLIC PANEL, COMPREHENSIVE   Result Value Ref Range    Sodium 140 136 - 145 mmol/L    Potassium 4.4 3.5 - 5.5 mmol/L    Chloride 106 100 - 108 mmol/L    CO2 25 21 - 32 mmol/L    Anion gap 9 3.0 - 18 mmol/L    Glucose 84 74 - 99 mg/dL    BUN 26 (H) 7.0 - 18 MG/DL    Creatinine 0.94 0.6 - 1.3 MG/DL    BUN/Creatinine ratio 28 (H) 12 - 20      GFR est AA >60 >60 ml/min/1.73m2    GFR est non-AA 59 (L) >60 ml/min/1.73m2    Calcium 8.9 8.5 - 10.1 MG/DL    Bilirubin, total 0.8 0.2 - 1.0 MG/DL    ALT (SGPT) 25 13 - 56 U/L    AST (SGOT) 22 15 - 37 U/L    Alk.  phosphatase 89 45 - 117 U/L    Protein, total 7.1 6.4 - 8.2 g/dL    Albumin 4.3 3.4 - 5.0 g/dL    Globulin 2.8 2.0 - 4.0 g/dL    A-G Ratio 1.5 0.8 - 1.7     TSH 3RD GENERATION   Result Value Ref Range    TSH 1.30 0.36 - 3.74 uIU/mL   URINALYSIS W/ RFLX MICROSCOPIC   Result Value Ref Range    Color YELLOW      Appearance CLEAR      Specific gravity 1.029 1.005 - 1.030      pH (UA) 5.5 5.0 - 8.0      Protein NEGATIVE  NEG mg/dL    Glucose NEGATIVE  NEG mg/dL    Ketone NEGATIVE  NEG mg/dL    Bilirubin NEGATIVE  NEG      Blood NEGATIVE  NEG      Urobilinogen 0.2 0.2 - 1.0 EU/dL    Nitrites NEGATIVE  NEG      Leukocyte Esterase NEGATIVE  NEG     VITAMIN D, 25 HYDROXY   Result Value Ref Range    Vitamin D 25-Hydroxy 35.1 30 - 100 ng/mL     Assessment: #1. Hypertension doing well. She will continue lisinopril. #2. Hyperlipidemia not doing quite as well. She will continue very healthy diet and atorvastatin 10 mg each evening. #3. Anxiety doing quite well. She has some family stresses and uses alprazolam 0.25 mg as needed. #4.  Nonallergic rhinitis doing well, will renew her nasal spray. #5.  Fairly frequent nocturnal cramps in the feet, discussed that with her and she will continue to get out of bed and walk around when this occurs. Follow-up in 4 months with [facility] YOAN Diaz MD FACP    Please note: This document has been produced using voice recognition software. Unrecognized errors in transcription may be present.

## 2017-06-29 NOTE — MR AVS SNAPSHOT
Visit Information Date & Time Provider Department Dept. Phone Encounter #  
 6/29/2017  9:15 AM Jackeline Polo MD Internist of [address] [place] [address] Place [id number] Follow-up Instructions Follow-up and Disposition History Your Appointments 7/31/2017  9:00 AM  
Office Visit with ROSARIO Vanegas Internist of [facility]) Appt Note: pre op - Dr. Katelyn Cabrales 8/9 and 8/16  
 [address] [facility], [address] On license of [facility] [address]  
  
   
 [address], [address]  
  
    
 11/2/2017 10:00 AM  
Office Visit with Jackeline Polo MD  
Internist of [address] [place] [address] [facility] Appt Note: 4 month f/u  
 [address] [facility], [address] [place] [address]  
  
   
 [address], [address] Upcoming Health Maintenance Date Due INFLUENZA AGE 9 TO ADULT 8/1/2017 MEDICARE YEARLY EXAM 3/8/2018 GLAUCOMA SCREENING Q2Y 6/1/2018 BREAST CANCER SCRN MAMMOGRAM 5/2/2019 COLONOSCOPY 6/3/2026 DTaP/Tdap/Td series (2 - Td) 3/7/2027 Allergies as of 6/29/2017  Review Complete On: 6/29/2017 By: Jackeline Polo MD  
 No Known Allergies Current Immunizations  Reviewed on 6/29/2017 Name Date Influenza High Dose Vaccine PF 11/1/2016 11:12 AM, 10/23/2015, 9/23/2014  9:19 AM  
 Influenza Vaccine PF 10/10/2013 11:49 AM  
 Influenza Vaccine Split 10/17/2012 Pneumococcal Conjugate (PCV-13) 4/8/2015 Pneumococcal Polysaccharide (PPSV-23) 1/1/2009 Pneumococcal Vaccine (Unspecified Type) 1/1/2009 Td 1/1/2009 Zoster Vaccine, Live 1/1/2011 Reviewed by Jackeline Polo MD on 6/29/2017 at  9:44 AM  
You Were Diagnosed With   
  
 Codes Comments Essential hypertension with goal blood pressure less than 140/90    -  Primary ICD-10-CM: I10 
ICD-9-CM: 401.9 Hyperlipidemia LDL goal <100     ICD-10-CM: E78.5 ICD-9-CM: 272.4 Anxiety     ICD-10-CM: F41.9 ICD-9-CM: 300.00 Vitals BP Pulse Temp Resp Height(growth percentile) Weight(growth percentile) 124/68 74 98.2 °F (36.8 °C) (Oral) 12 5' 1\" (1.549 m) 116 lb 3.2 oz (52.7 kg) SpO2 BMI OB Status Smoking Status 96% 21.96 kg/m2 Hysterectomy Never Smoker Vitals History BMI and BSA Data Body Mass Index Body Surface Area  
 21.96 kg/m 2 1.51 m 2 Preferred Pharmacy [facility] Phone [facility] [address] [facility] [address] [place] [address] [phone] Your Updated Medication List  
  
   
This list is accurate as of: 6/29/17 10:05 AM.  Always use your most recent med list.  
  
  
  
  
 ALPRAZolam 0.25 mg tablet Commonly known as:  Josiah Proud Take 1 Tab by mouth three (3) times daily as needed for Anxiety. Max Daily Amount: 0.75 mg.  
  
 aspirin delayed-release 81 mg tablet Take 81 mg by mouth daily. atorvastatin 10 mg tablet Commonly known as:  LIPITOR  
TAKE ONE TABLET BY MOUTH EVERY DAY  
  
 CALCIUM 600 + D 600-125 mg-unit Tab Generic drug:  calcium-cholecalciferol (d3) Take 1 Tab by mouth daily. FISH OIL 1,000 mg Cap Generic drug:  omega-3 fatty acids-vitamin e Take 1 Cap by mouth daily. ipratropium 0.03 % nasal spray Commonly known as:  ATROVENT  
USE 2 SPRAYS IN EACH NOSTRIL EVERY 12 HOURS  
  
 lisinopril 40 mg tablet Commonly known as:  PRINIVIL, ZESTRIL  
TAKE 1 TABLET TWICE DAILY MULTI FOR HER PO Take 1 Tab by mouth daily. polyethylene glycol 17 gram/dose powder Commonly known as:  Ellan Grout Take 17 g by mouth daily. VITAMIN D3 2,000 unit Tab Generic drug:  cholecalciferol (vitamin D3) Take 1 Tab by mouth daily. Prescriptions Sent to Pharmacy Refills  
 ipratropium (ATROVENT) 0.03 % nasal spray 3 Sig: USE 2 SPRAYS IN EACH NOSTRIL EVERY 12 HOURS  Class: Normal  
 Pharmacy: [address] [place] [address], [address] [place] [address] Ph #: [phone]  
 lisinopril (PRINIVIL, ZESTRIL) 40 mg tablet 3 Sig: TAKE 1 TABLET TWICE DAILY Class: Normal  
 Pharmacy: [address] [place] [address], [address] [place] [address] Ph #: [phone] To-Do List   
 Around 10/23/2017 Lab:  LIPID PANEL Around 10/23/2017 Lab:  METABOLIC PANEL, COMPREHENSIVE Introducing [facility]! Dear Cristino Larry: 
Thank you for requesting a [facility] account. Our records indicate that you have previously registered for a [facility] [facility] account but its currently inactive. Please call our [employer] [facility] support line at [phone]. Additional Information If you have questions, please visit the Frequently Asked Questions section of the [employer] [facility] website at [email or web address] Remember, [facility] is NOT to be used for urgent needs. For medical emergencies, dial 911. Now available from your iPhone and Android! Please provide this summary of care documentation to your next provider. Your primary care clinician is listed as Cecilio Cedeño. If you have any questions after today's visit, please call [phone].

## 2017-07-31 ENCOUNTER — OFFICE VISIT (OUTPATIENT)
Dept: INTERNAL MEDICINE CLINIC | Age: 72
End: 2017-07-31

## 2017-07-31 VITALS
DIASTOLIC BLOOD PRESSURE: 60 MMHG | RESPIRATION RATE: 14 BRPM | SYSTOLIC BLOOD PRESSURE: 110 MMHG | BODY MASS INDEX: 21.83 KG/M2 | WEIGHT: 115.6 LBS | HEART RATE: 67 BPM | TEMPERATURE: 98.1 F | OXYGEN SATURATION: 99 % | HEIGHT: 61 IN

## 2017-07-31 DIAGNOSIS — E78.5 HYPERLIPIDEMIA LDL GOAL <100: ICD-10-CM

## 2017-07-31 DIAGNOSIS — M85.80 OSTEOPENIA, UNSPECIFIED LOCATION: ICD-10-CM

## 2017-07-31 DIAGNOSIS — R31.29 MICROSCOPIC HEMATURIA: ICD-10-CM

## 2017-07-31 DIAGNOSIS — F41.9 ANXIETY: ICD-10-CM

## 2017-07-31 DIAGNOSIS — Z01.818 PREOPERATIVE CLEARANCE: ICD-10-CM

## 2017-07-31 DIAGNOSIS — I10 ESSENTIAL HYPERTENSION WITH GOAL BLOOD PRESSURE LESS THAN 140/90: Primary | ICD-10-CM

## 2017-07-31 DIAGNOSIS — M48.061 LUMBAR SPINAL STENOSIS: ICD-10-CM

## 2017-07-31 DIAGNOSIS — C18.9 MALIGNANT NEOPLASM OF COLON, UNSPECIFIED PART OF COLON (HCC): ICD-10-CM

## 2017-07-31 DIAGNOSIS — K21.9 GASTROESOPHAGEAL REFLUX DISEASE, ESOPHAGITIS PRESENCE NOT SPECIFIED: ICD-10-CM

## 2017-07-31 NOTE — MR AVS SNAPSHOT
Visit Information Date & Time Provider Department Dept. Phone Encounter #  
 7/31/2017  9:00 AM Cm Arellanoma Internist of [address] [id number] Your Appointments 10/20/2017  9:25 AM  
LAB with [facility] NURSE VISIT Internist of [facility] (Amaury Melissa) Appt Note: lab  
 [address], [address] [facility] [address]  
  
   
 [address]  
  
    
 11/2/2017 10:00 AM  
Office Visit with Jocelyn San MD  
Internist of [address] [place] [address] Amaury Cincinnatis Appt Note: 4 month f/u  
 [address] [facility], [address] [facility] [address]  
  
   
 [address] Upcoming Health Maintenance Date Due INFLUENZA AGE 9 TO ADULT 8/1/2017 MEDICARE YEARLY EXAM 3/8/2018 GLAUCOMA SCREENING Q2Y 6/1/2018 BREAST CANCER SCRN MAMMOGRAM 5/2/2019 COLONOSCOPY 6/3/2026 DTaP/Tdap/Td series (2 - Td) 3/7/2027 Allergies as of 7/31/2017  Review Complete On: 7/31/2017 By: Victor Hugo Martini No Known Allergies Current Immunizations  Reviewed on 6/29/2017 Name Date Influenza High Dose Vaccine PF 11/1/2016 11:12 AM, 10/23/2015, 9/23/2014  9:19 AM  
 Influenza Vaccine PF 10/10/2013 11:49 AM  
 Influenza Vaccine Split 10/17/2012 Pneumococcal Conjugate (PCV-13) 4/8/2015 Pneumococcal Polysaccharide (PPSV-23) 1/1/2009 Pneumococcal Vaccine (Unspecified Type) 1/1/2009 Td 1/1/2009 Zoster Vaccine, Live 1/1/2011 Not reviewed this visit Vitals BP Pulse Temp Resp Height(growth percentile) Weight(growth percentile) 110/60 (BP 1 Location: Right arm, BP Patient Position: Sitting) 67 98.1 °F (36.7 °C) (Oral) 14 5' 1\" (1.549 m) 115 lb 9.6 oz (52.4 kg) SpO2 BMI OB Status Smoking Status 99% 21.84 kg/m2 Hysterectomy Never Smoker Vitals History BMI and BSA Data Body Mass Index Body Surface Area 21.84 kg/m 2 1.5 m 2 Preferred Pharmacy Pharmacy Name Phone Emily Jefferson, [facility] - [address] [facility] [address] [phone] Your Updated Medication List  
  
   
This list is accurate as of: 7/31/17  9:20 AM.  Always use your most recent med list.  
  
  
  
  
 ALPRAZolam 0.25 mg tablet Commonly known as:  Leo Flack Take 1 Tab by mouth three (3) times daily as needed for Anxiety. Max Daily Amount: 0.75 mg.  
  
 aspirin delayed-release 81 mg tablet Take 81 mg by mouth daily. atorvastatin 10 mg tablet Commonly known as:  LIPITOR  
TAKE ONE TABLET BY MOUTH EVERY DAY  
  
 CALCIUM 600 + D 600-125 mg-unit Tab Generic drug:  calcium-cholecalciferol (d3) Take 1 Tab by mouth daily. DELSYM COUGH & COLD PO Take  by mouth as needed. FISH OIL 1,000 mg Cap Generic drug:  omega-3 fatty acids-vitamin e Take 1 Cap by mouth daily. ipratropium 0.03 % nasal spray Commonly known as:  ATROVENT  
USE 2 SPRAYS IN EACH NOSTRIL EVERY 12 HOURS  
  
 lisinopril 40 mg tablet Commonly known as:  PRINIVIL, ZESTRIL  
TAKE 1 TABLET TWICE DAILY MULTI FOR HER PO Take 1 Tab by mouth daily. polyethylene glycol 17 gram/dose powder Commonly known as:  Lisset Hussar Take 17 g by mouth daily. VITAMIN D3 2,000 unit Tab Generic drug:  cholecalciferol (vitamin D3) Take 1 Tab by mouth daily. Introducing \[facility]\"" & HEALTH SERVICES! Dear Andrew Abdalla: 
Thank you for requesting a Amind account. Our records indicate that you already have an active Amind account. You can access your account anytime at [email or web address]. [email or web address] Did you know that you can access your hospital and ER discharge instructions at any time in Amind? You can also review all of your test results from your hospital stay or ER visit. Additional Information If you have questions, please visit the Frequently Asked Questions section of the [employer] website at [email or web address] Remember, [employer] is NOT to be used for urgent needs. For medical emergencies, dial 911. Now available from your iPhone and Android! Please provide this summary of care documentation to your next provider. Your primary care clinician is listed as Álvaro Pride. Michael Thakkar. If you have any questions after today's visit, please call [phone].

## 2017-07-31 NOTE — PROGRESS NOTES
HPI/History  Benny Rodarte is a 67 y.o.  female who presents for med clearance for cataract removal with Dr. Katlyn Andrews scheduled ofr 8/9 and 8/16. HTN treated with lisinopril. Appears adequately controlled. No cardiopulmonary sxs. HLD treated with lipitor and fish oil without issue. Somewhat inconsistent with fish oil but takes statin regularly. Also on 81mg ASA. Hx of colon cancer but no signs of recurrence or other GI issues. Takes miralax fairly regularly. Hx of microhematuria with no overt sxs or changes. Osteopenia and taking Ca plus D, extra D, and multivitamin. Reflux has not been an issue in some time per report. Anxiety rarely and rarely uses xanax. No violent ideation. Lumbar spinal dz as noted below. Appears stable and doing well currently/recently per report. No complaints today. Otherwise, pt states she is doing very well. No other sxs or complaints. Patient Active Problem List   Diagnosis Code    Colon cancer 1989 C18.9    Osteopenia M85.80    Carotid artery disease mild I77.9    Microscopic hematuria, Chronic R31.29    Leg pain, bilateral M79.604, M79.605    Reflux K21.9    Radiculitis of leg M54.10    Chronic lumbar radiculopathy M54.16    Lumbar spinal stenosis M48.06    Spondylolisthesis, grade 1 M43.10    Degeneration of lumbar or lumbosacral intervertebral disc M51.37    Advance directive on file Z78.9    Essential hypertension with goal blood pressure less than 140/90 I10    Hyperlipidemia LDL goal <100 E78.5    Allergic rhinitis J30.9    Left arm pain M79.602    Primary insomnia F51.01    Anxiety F41.9     Past Medical History:   Diagnosis Date    Allergic rhinitis     Cancer ([facility] [address]     colon    Carotid duplex study 02/08/2011    No significant stenosis >49% bilaterally.       Cold     currently with post nasal drip    Headache     Hypercholesterolemia     Hypertension     Normal nuclear stress test 01/29/2009    No evidence of scarring or ischemia. EF 78%. No reg WMA. Neg max EST w/good exercise.  Osteopenia      Past Surgical History:   Procedure Laterality Date    COLONOSCOPY N/A 6/3/2016    COLONOSCOPY, SURVEILLANCE performed by Jessica Gomez MD at [facility] ENDOSCOPY    ENDOSCOPY, COLON, DIAGNOSTIC      HX BUNIONECTOMY      x 2    HX GI      colon resection    HX GYN      hysterectomy    HX HEENT      tonsillectomy    HX HEMORRHOIDECTOMY      HX HYSTERECTOMY      HX ORTHOPAEDIC  July 2015    lumbar fusion     Social History     Social History    Marital status:      Spouse name: N/A    Number of children: N/A    Years of education: N/A     Occupational History    Not on file. Social History Main Topics    Smoking status: Never Smoker    Smokeless tobacco: Never Used    Alcohol use Yes      Comment: socially    Drug use: No    Sexual activity: Not on file     Other Topics Concern    Not on file     Social History Narrative     Family History   Problem Relation Age of Onset    Heart Disease Mother     Hypertension Mother     Heart Surgery Mother      triple bypass    Heart Disease Brother     Heart Attack Brother      massive MI    Hypertension Brother      Current Outpatient Prescriptions   Medication Sig    DM/ACETAMINOPHEN/DOXYLAMINE (DELSYM COUGH & COLD PO) Take  by mouth as needed.  ipratropium (ATROVENT) 0.03 % nasal spray USE 2 SPRAYS IN EACH NOSTRIL EVERY 12 HOURS    lisinopril (PRINIVIL, ZESTRIL) 40 mg tablet TAKE 1 TABLET TWICE DAILY    ALPRAZolam (XANAX) 0.25 mg tablet Take 1 Tab by mouth three (3) times daily as needed for Anxiety. Max Daily Amount: 0.75 mg.    atorvastatin (LIPITOR) 10 mg tablet TAKE ONE TABLET BY MOUTH EVERY DAY    polyethylene glycol (MIRALAX) 17 gram/dose powder Take 17 g by mouth daily.  cholecalciferol, vitamin D3, (VITAMIN D3) 2,000 unit Tab Take 1 Tab by mouth daily.       omega-3 fatty acids-vitamin e (FISH OIL) 1,000 mg Cap Take 1 Cap by mouth daily.    aspirin delayed-release 81 mg tablet Take 81 mg by mouth daily.  MULTIVITS,CA,MINERALS/IRON/FA (MULTI FOR HER PO) Take 1 Tab by mouth daily.  calcium-cholecalciferol, d3, (CALCIUM 600 + D) 600-125 mg-unit Tab Take 1 Tab by mouth daily. No current facility-administered medications for this visit. No Known Allergies    Review of Systems  Aside from those included in HPI, remainder of complete ROS negative. Physical Examination  Visit Vitals    /60 (BP 1 Location: Right arm, BP Patient Position: Sitting)    Pulse 67    Temp 98.1 °F (36.7 °C) (Oral)    Resp 14    Ht 5' 1\" (1.549 m)    Wt 115 lb 9.6 oz (52.4 kg)    SpO2 99%    BMI 21.84 kg/m2       General - Alert and in no acute distress. Pt appears well, comfortable, and in good spirits. Pleasant, engaging. Nontoxic. Not anxious, non-diaphoretic. Mental status - Appropriate mood, behavior, speech content, dress, and thought processes. Eyes - Pupils equal and reactive, extraocular movements intact. No erythema or discharge. Ears - Auditory canals and TMs unremarkable. Nose - No erythema. No rhinorrhea. Mouth - Mucous membranes moist. Pharynx without lesions, swelling, erythema, or exudate. Neck - Supple without rigidity. Lymph - No periauricular, perimandibular, or cervical tenderness or swelling. Pulm - No tachypnea, retractions, or cyanosis. Good respiratory effort. Clear to auscultation bilat. No appreciable wheezes, rales, or rhonchi. Cardiovascular - Normal rate, regular rhythm. No appreciable murmurs or gallops. No peripheral edema. Distal pulses intact. Abdomen - Nondistended. Active bowel sounds. Soft, nontender. No guarding, rigidity, or rebound. No appreciable masses. Neuromuscular - No focal findings or movement disorder noted. Assessment and Plan  1. HTN - Appears controlled. Continue current and monitor. 2. HLD - Continue current, TLC, and monitor.   3. Hx colon cancer - symptomatically stable with no signs of recurrence. 4. Microhematuria - no referable issues or changes. 5. Osteopenia - continue current regimen and follow. 6. Reflux - quiescent. Observation and preventive measures. 7. Anxiety - doing well. Rare xanax use prn. Observation. 8. Lumbar spinal dz - Doing relatively well. Observation. 9. Med clearance for cataract removal - Pt appears medically stable to undergo procedure as planned. Pt happily agrees with plan. PLEASE NOTE:   This document has been produced using voice recognition software. Unrecognized errors in transcription may be present.     [employer] of [address] [place] [address]  [phone]  7/31/2017

## 2017-07-31 NOTE — PROGRESS NOTES
1. Have you been to the ER, urgent care clinic or hospitalized since your last visit? NO.     2. Have you seen or consulted any other health care providers outside of the [address] [place] [address] since your last visit (Include any pap smears or colon screening)? NO      Do you have an Advanced Directive? YES    Would you like information on Advanced Directives?  NO

## 2017-08-07 ENCOUNTER — TELEPHONE (OUTPATIENT)
Dept: INTERNAL MEDICINE CLINIC | Age: 72
End: 2017-08-07

## 2017-08-24 RX ORDER — ATORVASTATIN CALCIUM 10 MG/1
TABLET, FILM COATED ORAL
Qty: 90 TAB | Refills: 3 | Status: SHIPPED | OUTPATIENT
Start: 2017-08-24 | End: 2017-08-24 | Stop reason: SDUPTHER

## 2017-08-24 RX ORDER — ATORVASTATIN CALCIUM 10 MG/1
TABLET, FILM COATED ORAL
Qty: 90 TAB | Refills: 3 | Status: SHIPPED | OUTPATIENT
Start: 2017-08-24 | End: 2018-09-16 | Stop reason: SDUPTHER

## 2017-08-24 NOTE — TELEPHONE ENCOUNTER
From: Jaycob Merza  To: Madison Gonzalez MD  Sent: 8/23/2017 10:23 PM EDT  Subject: Medication Renewal Request    Original authorizing provider: MD Clarita Sparrow would like a refill of the following medications:  atorvastatin (LIPITOR) 10 mg tablet Madison Gonzalez MD]    Preferred pharmacy: [address] [facility], [address]    Comment:

## 2017-10-20 ENCOUNTER — HOSPITAL ENCOUNTER (OUTPATIENT)
Dept: LAB | Age: 72
Discharge: HOME | End: 2017-10-20
Payer: MEDICARE

## 2017-10-20 LAB
ALBUMIN SERPL-MCNC: 4.1 G/DL (ref 3.4–5)
ALBUMIN/GLOB SERPL: 1.3 {RATIO} (ref 0.8–1.7)
ALP SERPL-CCNC: 95 U/L (ref 45–117)
ALT SERPL-CCNC: 27 U/L (ref 13–56)
ANION GAP SERPL CALC-SCNC: 10 MMOL/L (ref 3–18)
AST SERPL-CCNC: 20 U/L (ref 15–37)
BILIRUB SERPL-MCNC: 0.9 MG/DL (ref 0.2–1)
BUN SERPL-MCNC: 15 MG/DL (ref 7–18)
BUN/CREAT SERPL: 18 (ref 12–20)
CALCIUM SERPL-MCNC: 8.6 MG/DL (ref 8.5–10.1)
CHLORIDE SERPL-SCNC: 104 MMOL/L (ref 100–108)
CHOLEST SERPL-MCNC: 174 MG/DL
CO2 SERPL-SCNC: 28 MMOL/L (ref 21–32)
CREAT SERPL-MCNC: 0.82 MG/DL (ref 0.6–1.3)
GLOBULIN SER CALC-MCNC: 3.1 G/DL (ref 2–4)
GLUCOSE SERPL-MCNC: 82 MG/DL (ref 74–99)
HDLC SERPL-MCNC: 79 MG/DL (ref 40–60)
HDLC SERPL: 2.2 {RATIO} (ref 0–5)
LDLC SERPL CALC-MCNC: 76.6 MG/DL (ref 0–100)
LIPID PROFILE,FLP: ABNORMAL
POTASSIUM SERPL-SCNC: 3.8 MMOL/L (ref 3.5–5.5)
PROT SERPL-MCNC: 7.2 G/DL (ref 6.4–8.2)
SODIUM SERPL-SCNC: 142 MMOL/L (ref 136–145)
TRIGL SERPL-MCNC: 92 MG/DL (ref ?–150)
VLDLC SERPL CALC-MCNC: 18.4 MG/DL

## 2017-10-20 PROCEDURE — 80061 LIPID PANEL: CPT | Performed by: INTERNAL MEDICINE

## 2017-10-20 PROCEDURE — 36415 COLL VENOUS BLD VENIPUNCTURE: CPT | Performed by: INTERNAL MEDICINE

## 2017-10-20 PROCEDURE — 80053 COMPREHEN METABOLIC PANEL: CPT | Performed by: INTERNAL MEDICINE

## 2017-10-23 DIAGNOSIS — E78.5 HYPERLIPIDEMIA LDL GOAL <100: ICD-10-CM

## 2017-10-31 ENCOUNTER — TELEPHONE (OUTPATIENT)
Dept: INTERNAL MEDICINE CLINIC | Age: 72
End: 2017-10-31

## 2017-10-31 NOTE — TELEPHONE ENCOUNTER
REBECCAI-PT coming tomorrow for a 4 month f/u and also because her BP has been elevated- it was 170/87 today and has been in the 150's -160's for about a week

## 2017-11-01 ENCOUNTER — OFFICE VISIT (OUTPATIENT)
Dept: INTERNAL MEDICINE CLINIC | Age: 72
End: 2017-11-01

## 2017-11-01 VITALS
SYSTOLIC BLOOD PRESSURE: 128 MMHG | TEMPERATURE: 98 F | OXYGEN SATURATION: 97 % | DIASTOLIC BLOOD PRESSURE: 70 MMHG | BODY MASS INDEX: 22.01 KG/M2 | HEIGHT: 61 IN | RESPIRATION RATE: 12 BRPM | HEART RATE: 78 BPM | WEIGHT: 116.6 LBS

## 2017-11-01 DIAGNOSIS — E78.5 HYPERLIPIDEMIA LDL GOAL <100: ICD-10-CM

## 2017-11-01 DIAGNOSIS — I10 ESSENTIAL HYPERTENSION WITH GOAL BLOOD PRESSURE LESS THAN 140/90: Primary | ICD-10-CM

## 2017-11-01 DIAGNOSIS — Z23 ENCOUNTER FOR IMMUNIZATION: ICD-10-CM

## 2017-11-01 DIAGNOSIS — F41.9 ANXIETY: ICD-10-CM

## 2017-11-01 RX ORDER — ALPRAZOLAM 0.25 MG/1
0.25 TABLET ORAL
Qty: 60 TAB | Refills: 0 | Status: SHIPPED | OUTPATIENT
Start: 2017-11-01 | End: 2019-04-08 | Stop reason: SDUPTHER

## 2017-11-01 NOTE — PROGRESS NOTES
Clarita Blanton,born 1945, is a 67 y.o. female, who is seen today for reevaluation of hypertension hyperlipidemia anxiety. She continues to have some anxiety regarding family situations involving at least 3 family members. Sometimes she feels nervous and checks her blood pressure and especially in the afternoons it may be elevated into the 140s and 150s. She is checked at other times morning and afternoon and sometimes it is in that range and sometimes it is down into the 130s. Seems to be a little higher in the afternoons than in the mornings. She takes her lisinopril regularly. She does need a refill on Xanax that she uses occasionally at 1/4 mg at a time. She is following very healthy low-salt low-fat diet. She also is noticed for the last month a slight discomfort sometimes in her upper right arm was found a tiny nodule posteriorly in that arm that is nontender. Past Medical History:   Diagnosis Date    Allergic rhinitis     Cancer [address]     colon    Carotid duplex study 02/08/2011    No significant stenosis >49% bilaterally.  Cold     currently with post nasal drip    Headache(784.0)     Hypercholesterolemia     Hypertension     Normal nuclear stress test 01/29/2009    No evidence of scarring or ischemia. EF 78%. No reg WMA. Neg max EST w/good exercise.  Osteopenia      Current Outpatient Prescriptions   Medication Sig Dispense Refill    ALPRAZolam (XANAX) 0.25 mg tablet Take 1 Tab by mouth three (3) times daily as needed for Anxiety. Max Daily Amount: 0.75 mg. 60 Tab 0    atorvastatin (LIPITOR) 10 mg tablet TAKE 1 TABLET EVERY DAY 90 Tab 3    DM/ACETAMINOPHEN/DOXYLAMINE (DELSYM COUGH & COLD PO) Take  by mouth as needed.       ipratropium (ATROVENT) 0.03 % nasal spray USE 2 SPRAYS IN EACH NOSTRIL EVERY 12 HOURS 60 mL 3    lisinopril (PRINIVIL, ZESTRIL) 40 mg tablet TAKE 1 TABLET TWICE DAILY 180 Tab 3    polyethylene glycol (MIRALAX) 17 gram/dose powder Take 17 g by mouth daily. 1530 g 3    cholecalciferol, vitamin D3, (VITAMIN D3) 2,000 unit Tab Take 1 Tab by mouth daily.  omega-3 fatty acids-vitamin e (FISH OIL) 1,000 mg Cap Take 1 Cap by mouth daily.  aspirin delayed-release 81 mg tablet Take 81 mg by mouth daily.  MULTIVITS,CA,MINERALS/IRON/FA (MULTI FOR HER PO) Take 1 Tab by mouth daily.  calcium-cholecalciferol, d3, (CALCIUM 600 + D) 600-125 mg-unit Tab Take 1 Tab by mouth daily. Visit Vitals    /70    Pulse 78    Temp 98 °F (36.7 °C) (Oral)    Resp 12    Ht 5' 1\" (1.549 m)    Wt 116 lb 9.6 oz (52.9 kg)    SpO2 97%    BMI 22.03 kg/m2     Carotids are 2+ without bruits. Lungs are clear to percussion. Good breath sounds with no wheezing or crackles. Heart reveals a regular rhythm with normal S1 and S2 no murmur gallop click or rub. Apical impulse is not palpable. Abdomen is soft and nontender with no hepatosplenomegaly or masses and no bruits. Extremities reveal no clubbing cyanosis or edema. Pulses are 2+. In the posterior upper right arm well below the axilla there is a tiny subcutaneous nodule that is firm and mobile.   Overlying skin appears normal.    Results for orders placed or performed during the hospital encounter of 10/20/17   LIPID PANEL   Result Value Ref Range    LIPID PROFILE          Cholesterol, total 174 <200 MG/DL    Triglyceride 92 <150 MG/DL    HDL Cholesterol 79 (H) 40 - 60 MG/DL    LDL, calculated 76.6 0 - 100 MG/DL    VLDL, calculated 18.4 MG/DL    CHOL/HDL Ratio 2.2 0 - 5.0     METABOLIC PANEL, COMPREHENSIVE   Result Value Ref Range    Sodium 142 136 - 145 mmol/L    Potassium 3.8 3.5 - 5.5 mmol/L    Chloride 104 100 - 108 mmol/L    CO2 28 21 - 32 mmol/L    Anion gap 10 3.0 - 18 mmol/L    Glucose 82 74 - 99 mg/dL    BUN 15 7.0 - 18 MG/DL    Creatinine 0.82 0.6 - 1.3 MG/DL    BUN/Creatinine ratio 18 12 - 20      GFR est AA >60 >60 ml/min/1.73m2    GFR est non-AA >60 >60 ml/min/1.73m2    Calcium 8.6 8.5 - 10.1 MG/DL    Bilirubin, total 0.9 0.2 - 1.0 MG/DL    ALT (SGPT) 27 13 - 56 U/L    AST (SGOT) 20 15 - 37 U/L    Alk. phosphatase 95 45 - 117 U/L    Protein, total 7.2 6.4 - 8.2 g/dL    Albumin 4.1 3.4 - 5.0 g/dL    Globulin 3.1 2.0 - 4.0 g/dL    A-G Ratio 1.3 0.8 - 1.7       Assessment: #1.  Labile blood pressure, always normal here and may be going up partly from anxiety. I discussed that with her. For now she will continue lisinopril 40 mg daily and will use Xanax 0.25 mg as needed and she is to check blood pressure sometimes in the morning sometimes in the afternoon but always after sitting 5 minutes and she should do 3 blood pressure readings in a row each time. She will return in 1 month with those readings and her blood pressure cuff to check against our standardized meter. #2. Anxiety may be causing some of her elevations of blood pressure and almost certainly her symptoms rather than elevation of blood pressure causing the symptoms. I discussed that with her as well. She will continue using alprazolam as needed. #3. Hyperlipidemia doing even a little better than usual.  She will continue Lipitor. She will get a flu shot now. Follow-up one month with blood pressure readings and her blood pressure cuff. Bhanu De La Paz MD FACP    Please note: This document has been produced using voice recognition software. Unrecognized errors in transcription may be present.

## 2017-11-01 NOTE — MR AVS SNAPSHOT
Visit Information Date & Time Provider Department Dept. Phone Encounter #  
 11/1/2017  8:30 AM Sonu Magana MD [facility] of [address] [place] [address] [phone] [id number] Follow-up Instructions Return in about 1 month (around 12/1/2017). Upcoming Health Maintenance Date Due INFLUENZA AGE 9 TO ADULT 8/1/2017 MEDICARE YEARLY EXAM 3/8/2018 GLAUCOMA SCREENING Q2Y 6/1/2018 BREAST CANCER SCRN MAMMOGRAM 5/2/2019 COLONOSCOPY 6/3/2026 DTaP/Tdap/Td series (2 - Td) 3/7/2027 Allergies as of 11/1/2017  Review Complete On: 11/1/2017 By: Sonu Magana MD  
 No Known Allergies Current Immunizations  Reviewed on 6/29/2017 Name Date Influenza High Dose Vaccine PF 11/1/2017  9:10 AM, 11/1/2016 11:12 AM, 10/23/2015, 9/23/2014  9:19 AM  
 Influenza Vaccine PF 10/10/2013 11:49 AM  
 Influenza Vaccine Split 10/17/2012 Pneumococcal Conjugate (PCV-13) 4/8/2015 Pneumococcal Polysaccharide (PPSV-23) 1/1/2009 Pneumococcal Vaccine (Unspecified Type) 1/1/2009 Td 1/1/2009 Zoster Vaccine, Live 1/1/2011 Not reviewed this visit You Were Diagnosed With   
  
 Codes Comments Essential hypertension with goal blood pressure less than 140/90    -  Primary ICD-10-CM: I10 
ICD-9-CM: 401.9 Encounter for immunization     ICD-10-CM: B45 ICD-9-CM: V03.89 Hyperlipidemia LDL goal <100     ICD-10-CM: E78.5 ICD-9-CM: 272.4 Anxiety     ICD-10-CM: F41.9 ICD-9-CM: 300.00 Vitals BP Pulse Temp Resp Height(growth percentile) Weight(growth percentile) 128/70 78 98 °F (36.7 °C) (Oral) 12 5' 1\" (1.549 m) 116 lb 9.6 oz (52.9 kg) SpO2 BMI OB Status Smoking Status 97% 22.03 kg/m2 Hysterectomy Never Smoker Vitals History BMI and BSA Data Body Mass Index Body Surface Area 22.03 kg/m 2 1.51 m 2 Preferred Pharmacy Pharmacy Name Phone  [address], [address] [facility] [address] [facility] [phone] Your Updated Medication List  
  
   
This list is accurate as of: 11/1/17  9:12 AM.  Always use your most recent med list.  
  
  
  
  
 ALPRAZolam 0.25 mg tablet Commonly known as:  Raven Ada Take 1 Tab by mouth three (3) times daily as needed for Anxiety. Max Daily Amount: 0.75 mg.  
  
 aspirin delayed-release 81 mg tablet Take 81 mg by mouth daily. atorvastatin 10 mg tablet Commonly known as:  LIPITOR  
TAKE 1 TABLET EVERY DAY  
  
 CALCIUM 600 + D 600-125 mg-unit Tab Generic drug:  calcium-cholecalciferol (d3) Take 1 Tab by mouth daily. DELSYM COUGH & COLD PO Take  by mouth as needed. FISH OIL 1,000 mg Cap Generic drug:  omega-3 fatty acids-vitamin e Take 1 Cap by mouth daily. ipratropium 0.03 % nasal spray Commonly known as:  ATROVENT  
USE 2 SPRAYS IN EACH NOSTRIL EVERY 12 HOURS  
  
 lisinopril 40 mg tablet Commonly known as:  PRINIVIL, ZESTRIL  
TAKE 1 TABLET TWICE DAILY MULTI FOR HER PO Take 1 Tab by mouth daily. polyethylene glycol 17 gram/dose powder Commonly known as:  Ronne Hayder Take 17 g by mouth daily. VITAMIN D3 2,000 unit Tab Generic drug:  cholecalciferol (vitamin D3) Take 1 Tab by mouth daily. Prescriptions Printed Refills ALPRAZolam (XANAX) 0.25 mg tablet 0 Sig: Take 1 Tab by mouth three (3) times daily as needed for Anxiety. Max Daily Amount: 0.75 mg. Class: Print Route: Oral  
  
We Performed the Following INFLUENZA VIRUS VACCINE, HIGH DOSE SEASONAL, PRESERVATIVE FREE [59486 CPT(R)] Follow-up Instructions Return in about 1 month (around 12/1/2017). Introducing [facility] & HEALTH SERVICES! Dear Maddie Anna: 
Thank you for requesting a [employer] account. Our records indicate that you already have an active [email or web address] account. You can access your account anytime at [email or web address] Did you know that you can access your hospital and ER discharge instructions at any time in [employer]? You can also review all of your test results from your hospital stay or ER visit. Additional Information If you have questions, please visit the Frequently Asked Questions section of the [employer] website at [email or web address] Remember, [employer] is NOT to be used for urgent needs. For medical emergencies, dial 911. Now available from your iPhone and Android! Please provide this summary of care documentation to your next provider. Your primary care clinician is listed as Rosalina Willis. Vasu Cordero. If you have any questions after today's visit, please call [phone].

## 2017-11-07 ENCOUNTER — TELEPHONE (OUTPATIENT)
Dept: INTERNAL MEDICINE CLINIC | Age: 72
End: 2017-11-07

## 2017-11-07 RX ORDER — BENZONATATE 200 MG/1
200 CAPSULE ORAL
Qty: 21 CAP | Refills: 0 | Status: SHIPPED | OUTPATIENT
Start: 2017-11-07 | End: 2017-11-14

## 2017-11-07 NOTE — TELEPHONE ENCOUNTER
Patient notified of medication being called into her pharmacy patient instruction to call the office to get scheduled if symptoms worsen

## 2017-11-07 NOTE — TELEPHONE ENCOUNTER
Patient says she has had a cough for over a week now and it doesn't seem like it is getting better. Says she has been taking delsym and it hasn't been helping. Wanted to know if there is something else Dr. Gabriella Galvan could suggest for her to take.  She said she doesn't have a fever and she doesn't feel bad just the cough   Pls Advise

## 2017-12-01 ENCOUNTER — OFFICE VISIT (OUTPATIENT)
Dept: INTERNAL MEDICINE CLINIC | Age: 72
End: 2017-12-01

## 2017-12-01 VITALS
HEART RATE: 66 BPM | HEIGHT: 61 IN | WEIGHT: 117 LBS | OXYGEN SATURATION: 98 % | BODY MASS INDEX: 22.09 KG/M2 | TEMPERATURE: 98.7 F | RESPIRATION RATE: 14 BRPM | SYSTOLIC BLOOD PRESSURE: 120 MMHG | DIASTOLIC BLOOD PRESSURE: 70 MMHG

## 2017-12-01 DIAGNOSIS — I10 ESSENTIAL HYPERTENSION WITH GOAL BLOOD PRESSURE LESS THAN 140/90: Primary | ICD-10-CM

## 2017-12-01 DIAGNOSIS — M25.571 ACUTE RIGHT ANKLE PAIN: ICD-10-CM

## 2017-12-01 RX ORDER — HYDROCHLOROTHIAZIDE 12.5 MG/1
12.5 TABLET ORAL DAILY
Qty: 90 TAB | Refills: 3 | Status: SHIPPED | OUTPATIENT
Start: 2017-12-01 | End: 2018-03-20 | Stop reason: ALTCHOICE

## 2017-12-01 NOTE — PROGRESS NOTES
Chief Complaint   Patient presents with    Hypertension     1 month follow up. ROOM 10     1. Have you been to the ER, urgent care clinic or hospitalized since your last visit? NO.     2. Have you seen or consulted any other health care providers outside of the [address] [place] [address] since your last visit (Include any pap smears or colon screening)? NO      Patient states she has had a cough for over a month and that it is a productive cough now. Patient states she has been taking her blood pressure at home and taking her medications as prescribed to her.

## 2017-12-01 NOTE — PROGRESS NOTES
Clarita Blanton,born 1945, is a 67 y.o. female, who is seen today for reevaluation of elevated blood pressure headaches for the last few weeks ankle pain recently that is resolved on the right. She had that a couple of years ago and intermittently since then and it lasts about 3 days at a time. She also has had a nonproductive cough recently but that seems to be getting better. Past Medical History:   Diagnosis Date    Allergic rhinitis     Cancer ([address]     colon    Carotid duplex study 02/08/2011    No significant stenosis >49% bilaterally.  Cold     currently with post nasal drip    Headache(784.0)     Hypercholesterolemia     Hypertension     Normal nuclear stress test 01/29/2009    No evidence of scarring or ischemia. EF 78%. No reg WMA. Neg max EST w/good exercise.  Osteopenia      Current Outpatient Prescriptions   Medication Sig Dispense Refill    ALPRAZolam (XANAX) 0.25 mg tablet Take 1 Tab by mouth three (3) times daily as needed for Anxiety. Max Daily Amount: 0.75 mg. 60 Tab 0    atorvastatin (LIPITOR) 10 mg tablet TAKE 1 TABLET EVERY DAY 90 Tab 3    ipratropium (ATROVENT) 0.03 % nasal spray USE 2 SPRAYS IN EACH NOSTRIL EVERY 12 HOURS 60 mL 3    lisinopril (PRINIVIL, ZESTRIL) 40 mg tablet TAKE 1 TABLET TWICE DAILY 180 Tab 3    polyethylene glycol (MIRALAX) 17 gram/dose powder Take 17 g by mouth daily. 1530 g 3    omega-3 fatty acids-vitamin e (FISH OIL) 1,000 mg Cap Take 1 Cap by mouth daily.  aspirin delayed-release 81 mg tablet Take 81 mg by mouth daily.  MULTIVITS,CA,MINERALS/IRON/FA (MULTI FOR HER PO) Take 1 Tab by mouth daily.  cholecalciferol, vitamin D3, (VITAMIN D3) 2,000 unit Tab Take 1 Tab by mouth daily.  calcium-cholecalciferol, d3, (CALCIUM 600 + D) 600-125 mg-unit Tab Take 1 Tab by mouth daily.        Visit Vitals    /70 (BP 1 Location: Left arm, BP Patient Position: Sitting)    Pulse 66    Temp 98.7 °F (37.1 °C)    Resp 14    Ht 5' 1\" (1.549 m)    Wt 117 lb (53.1 kg)    SpO2 98%    BMI 22.11 kg/m2     Her blood pressure cuff registered 142/72    Neck reveals no adenopathy or thyromegaly. Good range of motion of the neck without discomfort. No tenderness of the scalp where it hurts in the back. Lungs are clear to percussion. Good breath sounds with no wheezing or crackles. Heart reveals a regular rhythm with normal S1 and S2 no murmur gallop click or rub. Apical impulse is not palpable. Abdomen is soft and nontender with no hepatosplenomegaly or masses and no bruits. Extremities reveal no clubbing cyanosis or edema. Good range of motion of the right ankle without pain. Assessment: #1. Hypertension with systolic blood pressure quite a bit too high on her meter but still running a little high at home sometimes up in the 150 range. We will go ahead and add hydrochlorothiazide 12.5 mg daily. #2.  Headaches of uncertain etiology, probably not related to blood pressure. #3 dry cough seems to be resolving. #4.  Periodic discomfort in the lateral right ankle resolved, she should be seen when this is hurting her. Follow-up 3 months    Bhanu Rosa MD FACP    Please note: This document has been produced using voice recognition software. Unrecognized errors in transcription may be present.

## 2018-03-20 ENCOUNTER — OFFICE VISIT (OUTPATIENT)
Dept: INTERNAL MEDICINE CLINIC | Age: 73
End: 2018-03-20

## 2018-03-20 ENCOUNTER — HOSPITAL ENCOUNTER (OUTPATIENT)
Dept: LAB | Age: 73
Discharge: HOME | End: 2018-03-20
Payer: MEDICARE

## 2018-03-20 VITALS
HEART RATE: 66 BPM | RESPIRATION RATE: 12 BRPM | HEIGHT: 61 IN | TEMPERATURE: 98.1 F | SYSTOLIC BLOOD PRESSURE: 116 MMHG | BODY MASS INDEX: 21.79 KG/M2 | WEIGHT: 115.4 LBS | OXYGEN SATURATION: 98 % | DIASTOLIC BLOOD PRESSURE: 60 MMHG

## 2018-03-20 DIAGNOSIS — I10 ESSENTIAL HYPERTENSION WITH GOAL BLOOD PRESSURE LESS THAN 140/90: ICD-10-CM

## 2018-03-20 DIAGNOSIS — R00.0 TACHYCARDIA: ICD-10-CM

## 2018-03-20 DIAGNOSIS — R42 EPISODIC LIGHTHEADEDNESS: Primary | ICD-10-CM

## 2018-03-20 LAB
ALBUMIN SERPL-MCNC: 4 G/DL (ref 3.4–5)
ALBUMIN/GLOB SERPL: 1.2 {RATIO} (ref 0.8–1.7)
ALP SERPL-CCNC: 90 U/L (ref 45–117)
ALT SERPL-CCNC: 30 U/L (ref 13–56)
ANION GAP SERPL CALC-SCNC: 8 MMOL/L (ref 3–18)
AST SERPL-CCNC: 21 U/L (ref 15–37)
BASOPHILS # BLD: 0 K/UL (ref 0–0.06)
BASOPHILS NFR BLD: 1 % (ref 0–2)
BILIRUB SERPL-MCNC: 0.5 MG/DL (ref 0.2–1)
BUN SERPL-MCNC: 19 MG/DL (ref 7–18)
BUN/CREAT SERPL: 20 (ref 12–20)
CALCIUM SERPL-MCNC: 9.2 MG/DL (ref 8.5–10.1)
CHLORIDE SERPL-SCNC: 105 MMOL/L (ref 100–108)
CO2 SERPL-SCNC: 28 MMOL/L (ref 21–32)
CREAT SERPL-MCNC: 0.94 MG/DL (ref 0.6–1.3)
DIFFERENTIAL METHOD BLD: ABNORMAL
EOSINOPHIL # BLD: 0.1 K/UL (ref 0–0.4)
EOSINOPHIL NFR BLD: 2 % (ref 0–5)
ERYTHROCYTE [DISTWIDTH] IN BLOOD BY AUTOMATED COUNT: 13.5 % (ref 11.6–14.5)
GLOBULIN SER CALC-MCNC: 3.3 G/DL (ref 2–4)
GLUCOSE SERPL-MCNC: 72 MG/DL (ref 74–99)
HCT VFR BLD AUTO: 35.3 % (ref 35–45)
HGB BLD-MCNC: 11.2 G/DL (ref 12–16)
LYMPHOCYTES # BLD: 1.8 K/UL (ref 0.9–3.6)
LYMPHOCYTES NFR BLD: 33 % (ref 21–52)
MCH RBC QN AUTO: 28.2 PG (ref 24–34)
MCHC RBC AUTO-ENTMCNC: 31.7 G/DL (ref 31–37)
MCV RBC AUTO: 88.9 FL (ref 74–97)
MONOCYTES # BLD: 0.5 K/UL (ref 0.05–1.2)
MONOCYTES NFR BLD: 9 % (ref 3–10)
NEUTS SEG # BLD: 2.9 K/UL (ref 1.8–8)
NEUTS SEG NFR BLD: 55 % (ref 40–73)
PLATELET # BLD AUTO: 253 K/UL (ref 135–420)
PMV BLD AUTO: 10.8 FL (ref 9.2–11.8)
POTASSIUM SERPL-SCNC: 4.2 MMOL/L (ref 3.5–5.5)
PROT SERPL-MCNC: 7.3 G/DL (ref 6.4–8.2)
RBC # BLD AUTO: 3.97 M/UL (ref 4.2–5.3)
SODIUM SERPL-SCNC: 141 MMOL/L (ref 136–145)
T4 FREE SERPL-MCNC: 1.1 NG/DL (ref 0.7–1.5)
TSH SERPL DL<=0.05 MIU/L-ACNC: 1.2 UIU/ML (ref 0.36–3.74)
WBC # BLD AUTO: 5.3 K/UL (ref 4.6–13.2)

## 2018-03-20 PROCEDURE — 84439 ASSAY OF FREE THYROXINE: CPT | Performed by: INTERNAL MEDICINE

## 2018-03-20 PROCEDURE — 80053 COMPREHEN METABOLIC PANEL: CPT | Performed by: INTERNAL MEDICINE

## 2018-03-20 PROCEDURE — 84443 ASSAY THYROID STIM HORMONE: CPT | Performed by: INTERNAL MEDICINE

## 2018-03-20 PROCEDURE — 85025 COMPLETE CBC W/AUTO DIFF WBC: CPT | Performed by: INTERNAL MEDICINE

## 2018-03-20 NOTE — MR AVS SNAPSHOT
[facility] 
 
 
 [address], [address] [facility] 
[phone] Patient: Kanu Bishop MRN: [id number] MVS:4/4/[birth year]859 Visit Information Date & Time Provider Department Dept. Phone Encounter #  
 3/20/2018 11:30 AM Josue Cristina MD [facility] [id number] Your Appointments 3/30/2018 10:00 AM  
Office Visit with Josue Cristina MD  
[facility]) Appt Note: 10 day f/u  
 [address] [facility], [address] [place] [address] [address]  
  
   
 [address], [address]  
  
    
 4/6/2018  3:00 PM  
New Patient with Preet Jarvis DO [facility] ([facility]) Appt Note: New patient refer, self for low BP's old [employer], wants to see him only, approved by [employer] [facility] [address] [address] [address] [phone] [address] [place] [address] [address] Upcoming Health Maintenance Date Due  
 MEDICARE YEARLY EXAM 3/14/2018 GLAUCOMA SCREENING Q2Y 6/1/2018 BREAST CANCER SCRN MAMMOGRAM 5/2/2019 COLONOSCOPY 6/3/2026 DTaP/Tdap/Td series (2 - Td) 3/7/2027 Allergies as of 3/20/2018  Review Complete On: 3/20/2018 By: Josue Cristina MD  
 No Known Allergies Current Immunizations  Reviewed on 6/29/2017 Name Date Influenza High Dose Vaccine PF 11/1/2017  9:10 AM, 11/1/2016 11:12 AM, 10/23/2015, 9/23/2014  9:19 AM  
 Influenza Vaccine PF 10/10/2013 11:49 AM  
 Influenza Vaccine Split 10/17/2012 Pneumococcal Conjugate (PCV-13) 4/8/2015 Pneumococcal Polysaccharide (PPSV-23) 1/1/2009 Pneumococcal Vaccine (Unspecified Type) 1/1/2009 Td 1/1/2009 Zoster Vaccine, Live 1/1/2011 Not reviewed this visit You Were Diagnosed With   
  
 Codes Comments  Episodic lightheadedness    -  Primary ICD-10-CM: O43 
 ICD-9-CM: 780.4 Essential hypertension with goal blood pressure less than 140/90     ICD-10-CM: I10 
ICD-9-CM: 401.9 Tachycardia     ICD-10-CM: R00.0 ICD-9-CM: 661. 0 Vitals BP Pulse Temp Resp Height(growth percentile) Weight(growth percentile) 116/60 (BP 1 Location: Right arm, BP Patient Position: Sitting) 66 98.1 °F (36.7 °C) (Oral) 12 5' 1\" (1.549 m) 115 lb 6.4 oz (52.3 kg) SpO2 BMI OB Status Smoking Status 98% 21.8 kg/m2 Hysterectomy Never Smoker Vitals History BMI and BSA Data Body Mass Index Body Surface Area  
 21.8 kg/m 2 1.5 m 2 Preferred Pharmacy Pharmacy Name Phone [address] [place] [address], [address] [place] [address] [phone] Your Updated Medication List  
  
   
This list is accurate as of 3/20/18 12:43 PM.  Always use your most recent med list.  
  
  
  
  
 ALPRAZolam 0.25 mg tablet Commonly known as:  Yanique Snuffer Take 1 Tab by mouth three (3) times daily as needed for Anxiety. Max Daily Amount: 0.75 mg.  
  
 aspirin delayed-release 81 mg tablet Take 81 mg by mouth daily. atorvastatin 10 mg tablet Commonly known as:  LIPITOR  
TAKE 1 TABLET EVERY DAY  
  
 CALCIUM 600 + D 600-125 mg-unit Tab Generic drug:  calcium-cholecalciferol (d3) Take 1 Tab by mouth daily. FISH OIL 1,000 mg Cap Generic drug:  omega-3 fatty acids-vitamin e Take 1 Cap by mouth daily. ipratropium 0.03 % nasal spray Commonly known as:  ATROVENT  
USE 2 SPRAYS IN EACH NOSTRIL EVERY 12 HOURS MULTI FOR HER PO Take 1 Tab by mouth daily. polyethylene glycol 17 gram/dose powder Commonly known as:  Domonique Jimy Take 17 g by mouth daily. VITAMIN D3 2,000 unit Tab Generic drug:  cholecalciferol (vitamin D3) Take 1 Tab by mouth daily. To-Do List   
 Around 03/20/2018 Lab:  CBC WITH AUTOMATED DIFF Around 03/20/2018   Lab:  METABOLIC PANEL, COMPREHENSIVE   
  
 Around 03/20/2018 Lab:  T4, FREE Around 03/20/2018 Lab:  TSH 3RD GENERATION Introducing [facility]! Dear Kristian Martínez: 
Thank you for requesting a [employer] account. Our records indicate that you already have an active [employer] account. You can access your account anytime at [email or web address] [email or web address] Did you know that you can access your hospital and ER discharge instructions at any time in [employer]? You can also review all of your test results from your hospital stay or ER visit. Additional Information If you have questions, please visit the Frequently Asked Questions section of the [employer] website at [email or web address] Remember, [employer] is NOT to be used for urgent needs. For medical emergencies, dial 911. Now available from your iPhone and Android! Please provide this summary of care documentation to your next provider. Your primary care clinician is listed as Barbara Gastelum. If you have any questions after today's visit, please call [phone].

## 2018-03-20 NOTE — PROGRESS NOTES
Clarita Blanton,born 1945, is a 68 y.o. female, who is seen today for evaluation of low blood pressure, periods of feeling weak and tired and lightheaded over the last couple of weeks. Sometimes she feels shaky as well. She has had no falls. The spells come on standing either at rest or with activity and sometimes sitting and get a little better after she sits for a while. Eventually the symptoms cleared. This complexes occurring to 3 times a day. Sometimes the symptoms are associated with some nausea but her appetite is good and she has not lost weight. She has stopped hydrochlorothiazide about 3 months ago when blood pressure was too low and a few days ago she stopped lisinopril. Sometimes with these spells the legs feel weak also. Sometimes her heart races but she is not sure if it occurs at the time of the symptoms but always feels regular rather than irregular. No other new medicine is being used. Past Medical History:   Diagnosis Date    Allergic rhinitis     Cancer ([facility]     colon    Carotid duplex study 02/08/2011    No significant stenosis >49% bilaterally.  Cold     currently with post nasal drip    Headache(784.0)     Hypercholesterolemia     Hypertension     Normal nuclear stress test 01/29/2009    No evidence of scarring or ischemia. EF 78%. No reg WMA. Neg max EST w/good exercise.  Osteopenia      Current Outpatient Prescriptions   Medication Sig Dispense Refill    atorvastatin (LIPITOR) 10 mg tablet TAKE 1 TABLET EVERY DAY 90 Tab 3    ipratropium (ATROVENT) 0.03 % nasal spray USE 2 SPRAYS IN EACH NOSTRIL EVERY 12 HOURS 60 mL 3    polyethylene glycol (MIRALAX) 17 gram/dose powder Take 17 g by mouth daily. 1530 g 3    cholecalciferol, vitamin D3, (VITAMIN D3) 2,000 unit Tab Take 1 Tab by mouth daily.  omega-3 fatty acids-vitamin e (FISH OIL) 1,000 mg Cap Take 1 Cap by mouth daily.  aspirin delayed-release 81 mg tablet Take 81 mg by mouth daily.       MULTIVITS,CA,MINERALS/IRON/FA (MULTI FOR HER PO) Take 1 Tab by mouth daily.  calcium-cholecalciferol, d3, (CALCIUM 600 + D) 600-125 mg-unit Tab Take 1 Tab by mouth daily.  ALPRAZolam (XANAX) 0.25 mg tablet Take 1 Tab by mouth three (3) times daily as needed for Anxiety. Max Daily Amount: 0.75 mg. 60 Tab 0     Visit Vitals    /60 (BP 1 Location: Right arm, BP Patient Position: Sitting)    Pulse 66    Temp 98.1 °F (36.7 °C) (Oral)    Resp 12    Ht 5' 1\" (1.549 m)    Wt 115 lb 6.4 oz (52.3 kg)    SpO2 98%    BMI 21.8 kg/m2     Blood pressure after standing for 2 minutes was 110/60 and was repeatedly 98/50 supine also in the right arm. Blood pressure was a little more difficult to hear supine. Carotids are 2+ without bruits. Lungs are clear to percussion. Good breath sounds with no wheezing or crackles. Heart reveals a regular rhythm with normal S1 and S2 no murmur gallop click or rub. Apical impulse is not palpable. Abdomen is soft and nontender with no hepatosplenomegaly or masses and no bruits. Extremities reveal no clubbing cyanosis or edema. Pulses are 2+. Assessment: #1. Blood pressure is running considerably lower than it used to, similar to when she had hip surgery. She is not orthostatic, on the contrary blood pressure is actually lower supine. She was not lightheaded standing here in the office today. She will remain off hydrochlorothiazide and lisinopril for now and have asked her to monitor blood pressure at home, sitting at rest with the feet on the floor and do 3 readings at a time and record all 3 with about a minute between the recordings. She is to write them all down and bring them to her next appointment. We also will check CBC CMP. #2.  Rapid heart action, not currently present, she has not had any recorded high blood pressure reading so we will not check urine for metanephrine/normetanephrine.   I will check free T4 and TSH now and call results of the lab to her when available. She will continue to eat healthy diet with adequate fluids. If the tests are unrevealing and she continues to run a fair number of low readings particularly if she has sweats and nausea it might well be worth checking a 24-hour urine for 5 HIAA. Follow-up 10 days and she will call me if symptoms worsen. Bhanu Chris MD FACP    Please note: This document has been produced using voice recognition software. Unrecognized errors in transcription may be present.

## 2018-03-30 ENCOUNTER — OFFICE VISIT (OUTPATIENT)
Dept: INTERNAL MEDICINE CLINIC | Age: 73
End: 2018-03-30

## 2018-03-30 VITALS
HEIGHT: 61 IN | SYSTOLIC BLOOD PRESSURE: 118 MMHG | OXYGEN SATURATION: 96 % | TEMPERATURE: 98.5 F | HEART RATE: 67 BPM | RESPIRATION RATE: 12 BRPM | BODY MASS INDEX: 22.05 KG/M2 | WEIGHT: 116.8 LBS | DIASTOLIC BLOOD PRESSURE: 68 MMHG

## 2018-03-30 DIAGNOSIS — F41.9 ANXIETY: ICD-10-CM

## 2018-03-30 DIAGNOSIS — I10 ESSENTIAL HYPERTENSION WITH GOAL BLOOD PRESSURE LESS THAN 140/90: Primary | ICD-10-CM

## 2018-03-30 DIAGNOSIS — E78.5 HYPERLIPIDEMIA LDL GOAL <100: ICD-10-CM

## 2018-03-30 NOTE — MR AVS SNAPSHOT
[facility] 
 
 
 [address], [address] [facility] 
[phone] Patient: Shana Monge MRN: [id number] QBE:4/4/4466 Visit Information Date & Time Provider Department Dept. Phone Encounter #  
 3/30/2018 10:00 AM Joleen Cota MD [facility] [address] [place] [address] [phone] [id number] Your Appointments 4/6/2018  3:00 PM  
New Patient with Antoine Saucedo DO [facility] ([address] [place] [address]) Appt Note: New patient refer, self for low BP's old [employer], wants to see him only, approved by [place] [id number] [address] [phone] [place] [phone]  
  
    
 7/3/2018  9:35 AM  
LAB with [facility] NURSE VISIT [facility] [address] [place] [address] ([address] [place] [address]) Appt Note: labs [address], [facility] [address]  
  
   
 [address]  
  
    
 7/10/2018  3:00 PM  
PHYSICAL with Joleen Cota MD  
[facility] [address] [place] [address]) Appt Note: rpe per rm  
 [address], [place] [address] [address]  
  
   
 [address], [facility] Upcoming Health Maintenance Date Due  
 MEDICARE YEARLY EXAM 3/14/2018 GLAUCOMA SCREENING Q2Y 6/1/2018 BREAST CANCER SCRN MAMMOGRAM 5/2/2019 COLONOSCOPY 6/3/2026 DTaP/Tdap/Td series (2 - Td) 3/7/2027 Allergies as of 3/30/2018  Review Complete On: 3/30/2018 By: Joleen Cota MD  
 No Known Allergies Current Immunizations  Reviewed on 6/29/2017 Name Date Influenza High Dose Vaccine PF 11/1/2017  9:10 AM, 11/1/2016 11:12 AM, 10/23/2015, 9/23/2014  9:19 AM  
 Influenza Vaccine PF 10/10/2013 11:49 AM  
 Influenza Vaccine Split 10/17/2012 Pneumococcal Conjugate (PCV-13) 4/8/2015 Pneumococcal Polysaccharide (PPSV-23) 1/1/2009 Pneumococcal Vaccine (Unspecified Type) 1/1/2009 Td 1/1/2009 Zoster Vaccine, Live 1/1/2011 Not reviewed this visit You Were Diagnosed With   
  
 Codes Comments Essential hypertension with goal blood pressure less than 140/90    -  Primary ICD-10-CM: I10 
ICD-9-CM: 401.9 Hyperlipidemia LDL goal <100     ICD-10-CM: E78.5 ICD-9-CM: 272.4 Anxiety     ICD-10-CM: F41.9 ICD-9-CM: 300.00 Vitals BP Pulse Temp Resp Height(growth percentile) Weight(growth percentile)  
 118/68 (BP 1 Location: Right arm, BP Patient Position: Sitting) 67 98.5 °F (36.9 °C) (Oral) 12 5' 1\" (1.549 m) 116 lb 12.8 oz (53 kg) SpO2 BMI OB Status Smoking Status 96% 22.07 kg/m2 Hysterectomy Never Smoker Vitals History BMI and BSA Data Body Mass Index Body Surface Area 22.07 kg/m 2 1.51 m 2 Preferred Pharmacy Pharmacy Name Phone [facility] [address] [phone] Your Updated Medication List  
  
   
This list is accurate as of 3/30/18 10:32 AM.  Always use your most recent med list.  
  
  
  
  
 ALPRAZolam 0.25 mg tablet Commonly known as:  Yanique Welch Take 1 Tab by mouth three (3) times daily as needed for Anxiety. Max Daily Amount: 0.75 mg.  
  
 aspirin delayed-release 81 mg tablet Take 81 mg by mouth daily. atorvastatin 10 mg tablet Commonly known as:  LIPITOR  
TAKE 1 TABLET EVERY DAY  
  
 CALCIUM 600 + D 600-125 mg-unit Tab Generic drug:  calcium-cholecalciferol (d3) Take 1 Tab by mouth daily. FISH OIL 1,000 mg Cap Generic drug:  omega-3 fatty acids-vitamin e Take 1 Cap by mouth daily. ipratropium 0.03 % nasal spray Commonly known as:  ATROVENT  
USE 2 SPRAYS IN EACH NOSTRIL EVERY 12 HOURS MULTI FOR HER PO Take 1 Tab by mouth daily. polyethylene glycol 17 gram/dose powder Commonly known as:  Domonique Ahn Take 17 g by mouth daily. VITAMIN D3 2,000 unit Tab Generic drug:  cholecalciferol (vitamin D3) Take 1 Tab by mouth daily. To-Do List   
 Around 07/18/2018 Lab:  CBC WITH AUTOMATED DIFF Around 07/18/2018 Lab:  LIPID PANEL Around 07/18/2018 Lab:  METABOLIC PANEL, COMPREHENSIVE Around 07/18/2018 Lab:  T4, FREE Around 07/18/2018 Lab:  TSH 3RD GENERATION Introducing [facility]! Dear Rula Mustafa: 
Thank you for requesting a [employer] account. Our records indicate that you already have an active [employer] account. You can access your account anytime at [email or web address] Did you know that you can access your hospital and ER discharge instructions at any time in [employer]? You can also review all of your test results from your hospital stay or ER visit. Additional Information If you have questions, please visit the Frequently Asked Questions section of the [employer] website at [email or web address] Remember, [employer] is NOT to be used for urgent needs. For medical emergencies, dial 911. Now available from your iPhone and Android! Please provide this summary of care documentation to your next provider. Your primary care clinician is listed as Gwenetta Severs. Abran Casillas. If you have any questions after today's visit, please call [phone].

## 2018-03-30 NOTE — PROGRESS NOTES
Clarita Blanton,born 1945, is a 68 y.o. female, who is seen today for reevaluation of hypertension with recent low blood pressure, anxiety, hyperlipidemia. She has remained off lisinopril and also previously off hydrochlorothiazide. Blood pressures been measured at home frequently, 3 readings in a row and the lowest reading was 96 one day with all of the other readings at least 103 and a maximum reading of 136. Most of the readings have been in the 120s. She feels well, 1 day a little weak but not really lightheaded and no other spells of lightheadedness since I saw her 10 days ago. No palpitations or dyspnea. No exertional chest discomfort. Past Medical History:   Diagnosis Date    Allergic rhinitis     Cancer ([address]     colon    Carotid duplex study 02/08/2011    No significant stenosis >49% bilaterally.  Cold     currently with post nasal drip    Headache(784.0)     Hypercholesterolemia     Hypertension     Normal nuclear stress test 01/29/2009    No evidence of scarring or ischemia. EF 78%. No reg WMA. Neg max EST w/good exercise.  Osteopenia      Current Outpatient Prescriptions   Medication Sig Dispense Refill    ALPRAZolam (XANAX) 0.25 mg tablet Take 1 Tab by mouth three (3) times daily as needed for Anxiety. Max Daily Amount: 0.75 mg. 60 Tab 0    atorvastatin (LIPITOR) 10 mg tablet TAKE 1 TABLET EVERY DAY 90 Tab 3    ipratropium (ATROVENT) 0.03 % nasal spray USE 2 SPRAYS IN EACH NOSTRIL EVERY 12 HOURS 60 mL 3    polyethylene glycol (MIRALAX) 17 gram/dose powder Take 17 g by mouth daily. 1530 g 3    cholecalciferol, vitamin D3, (VITAMIN D3) 2,000 unit Tab Take 1 Tab by mouth daily.  omega-3 fatty acids-vitamin e (FISH OIL) 1,000 mg Cap Take 1 Cap by mouth daily.  aspirin delayed-release 81 mg tablet Take 81 mg by mouth daily.  MULTIVITS,CA,MINERALS/IRON/FA (MULTI FOR HER PO) Take 1 Tab by mouth daily.       calcium-cholecalciferol, d3, (CALCIUM 600 + D) 600-125 mg-unit Tab Take 1 Tab by mouth daily. Visit Vitals    /68 (BP 1 Location: Right arm, BP Patient Position: Sitting)    Pulse 67    Temp 98.5 °F (36.9 °C) (Oral)    Resp 12    Ht 5' 1\" (1.549 m)    Wt 116 lb 12.8 oz (53 kg)    SpO2 96%    BMI 22.07 kg/m2     No change in blood pressure upon standing for 3 minutes. Carotids are 2+ without bruits. Lungs are clear to percussion. Good breath sounds with no wheezing or crackles. Heart reveals a regular rhythm with normal S1 and S2 no murmur gallop click or rub. Apical impulse is not palpable. Abdomen is soft and nontender with no hepatosplenomegaly or masses and no bruits. Extremities reveal no clubbing cyanosis or edema. Pulses are 2+. Assessment: #1. History of hypertension blood pressure doing well currently off medicine. Blood pressure was actually too low when I saw her 10 days ago when she was symptomatic. She will monitor blood pressure perhaps once a week, 3 readings at a time and she finds blood pressure is persistently elevated above 140 she will call and probably I would restart lisinopril, uncertain which dose, that would depend on her blood pressure readings. #2.  Recent low blood pressures of uncertain etiology without other significant symptoms. If she becomes symptomatic again I would recheck her and consider further testing but testing for 24 hour urine level of 5 HIAA not really a consideration at this time. #3. Anxiety doing well. She will continue alprazolam 0.25 mg as needed. Follow-up in July for complete evaluation, or sooner if needed. Bhanu Martinez MD FACP    Please note: This document has been produced using voice recognition software. Unrecognized errors in transcription may be present. This visit lasted 25 minutes, greater than 50% of the time spent counseling as above in the assessment.

## 2018-04-06 ENCOUNTER — OFFICE VISIT (OUTPATIENT)
Dept: CARDIOLOGY CLINIC | Age: 73
End: 2018-04-06

## 2018-04-06 DIAGNOSIS — I10 ESSENTIAL HYPERTENSION WITH GOAL BLOOD PRESSURE LESS THAN 140/90: Primary | ICD-10-CM

## 2018-04-06 DIAGNOSIS — R42 DIZZINESS: ICD-10-CM

## 2018-04-06 DIAGNOSIS — R00.2 PALPITATIONS: ICD-10-CM

## 2018-04-06 DIAGNOSIS — E78.5 HYPERLIPIDEMIA LDL GOAL <100: ICD-10-CM

## 2018-04-06 NOTE — MR AVS SNAPSHOT
[address] [address] [address] [id number] [phone] Patient: Jc Logan MRN: [id number] Visit Information Date & Time Provider Department Dept. Phone Encounter #  
 2018  3:00 PM Jovi Garcia, [address] [place] [address] [facility] [address] [id number] Follow-up Instructions Return if symptoms worsen or fail to improve. Routing History Your Appointments 7/3/2018  9:35 AM  
LAB with [facility] NURSE VISIT [facility] of [address] [place] [address] ([facility] [place]) Appt Note: labs [address], [address] [facility] [address] [place] [address]  
  
   
 [address], [address]  
  
    
 7/10/2018  3:00 PM  
PHYSICAL with Nunu Brothers MD  
[facility]ists of [address] [place] [address]) Appt Note: rpe per rm  
 [address], [address] [id number] [address]  
  
   
 [address], [address] Upcoming Health Maintenance Date Due  
 MEDICARE YEARLY EXAM 3/14/2018 GLAUCOMA SCREENING Q2Y 2018 BREAST CANCER SCRN MAMMOGRAM 2019 COLONOSCOPY 6/3/2026 DTaP/Tdap/Td series (2 - Td) 3/7/2027 Allergies as of 2018  Review Complete On: 2018 By: Jovi Garcia, DO No Known Allergies Current Immunizations  Reviewed on 2017 Name Date Influenza High Dose Vaccine PF 2017  9:10 AM, 2016 11:12 AM, 10/23/2015, 2014  9:19 AM  
 Influenza Vaccine PF 10/10/2013 11:49 AM  
 Influenza Vaccine Split 10/17/2012 Pneumococcal Conjugate (PCV-13) 2015 Pneumococcal Polysaccharide (PPSV-23) 2009 Pneumococcal Vaccine (Unspecified Type) 2009 Td 2009 Zoster Vaccine, Live 2011 Not reviewed this visit You Were Diagnosed With   
  
 Codes Comments  Essential hypertension with goal blood pressure less than 140/90    - Primary ICD-10-CM: I10 
ICD-9-CM: 401.9 Hyperlipidemia LDL goal <100     ICD-10-CM: E78.5 ICD-9-CM: 272.4 Vitals BP Pulse Height(growth percentile) Weight(growth percentile) SpO2 BMI  
 (!) 120/98 67 5' 1\" (1.549 m) 119 lb (54 kg) 98% 22.48 kg/m2 OB Status Smoking Status Hysterectomy Never Smoker Vitals History BMI and BSA Data Body Mass Index Body Surface Area  
 22.48 kg/m 2 1.52 m 2 Preferred Pharmacy Pharmacy Name Phone [address] [place] [address], [address] [place] [phone] Your Updated Medication List  
  
   
This list is accurate as of 4/6/18  4:36 PM.  Always use your most recent med list.  
  
  
  
  
 ALPRAZolam 0.25 mg tablet Commonly known as:  Naheed Ricketts Take 1 Tab by mouth three (3) times daily as needed for Anxiety. Max Daily Amount: 0.75 mg.  
  
 aspirin delayed-release 81 mg tablet Take 81 mg by mouth daily. atorvastatin 10 mg tablet Commonly known as:  LIPITOR  
TAKE 1 TABLET EVERY DAY  
  
 CALCIUM 600 + D 600-125 mg-unit Tab Generic drug:  calcium-cholecalciferol (d3) Take 1 Tab by mouth daily. FISH OIL 1,000 mg Cap Generic drug:  omega-3 fatty acids-vitamin e Take 1 Cap by mouth daily. ipratropium 0.03 % nasal spray Commonly known as:  ATROVENT  
USE 2 SPRAYS IN EACH NOSTRIL EVERY 12 HOURS MULTI FOR HER PO Take 1 Tab by mouth daily. polyethylene glycol 17 gram/dose powder Commonly known as:  Nicanor Keo Take 17 g by mouth daily. VITAMIN D3 2,000 unit Tab Generic drug:  cholecalciferol (vitamin D3) Take 1 Tab by mouth daily. We Performed the Following AMB POC EKG ROUTINE W/ 12 LEADS, INTER & REP [27493 CPT(R)] Follow-up Instructions Return if symptoms worsen or fail to improve. Introducing [facility] & HE[facility] SERVICES! Dear Kristen Simeon: 
Thank you for requesting a [employer] account.   Our records indicate that you already have an active [employer] account. You can access your account anytime at [email or web address] Did you know that you can access your hospital and ER discharge instructions at any time in [employer]? You can also review all of your test results from your hospital stay or ER visit. Additional Information If you have questions, please visit the Frequently Asked Questions section of the [employer] website at [email or web address] Remember, [employer] is NOT to be used for urgent needs. For medical emergencies, dial 911. Now available from your iPhone and Android! Please provide this summary of care documentation to your next provider. Your primary care clinician is listed as Rafael Bateman. If you have any questions after today's visit, please call [phone].

## 2018-04-06 NOTE — PROGRESS NOTES
HPI: I saw Arturo Ogden in my office today in cardiovascular consultation regarding problems with lightheadedness, nausea, and palpitations which occurred about 3 weeks ago associated with lower blood pressure. Ms. Jimmy Guidry is a pleasant 24-year-old  female whom I saw once before back in April 2012 for problems with palpitations. Historically, she has had mild chronic hypercholesterolemia, essential systemic hypertension, and palpitations with negative cardiac workups in the past including a normal nuclear myocardial perfusion study back on January 29, 2009. She comes into the office today and relates that she has had 3 episodes with associated palpitations, nausea, lightheadedness, and shortness of breath with activity in the past two years lasting for a few days. The first episode happened 3 weeks after she had a back surgery in July 2016 the second episode occurred at the beginning of January of this year and the third episode about 3 weeks ago. The episode 3 weeks ago was associated with lower blood pressures the lowest of which she got was 89/56 and this prompted discontinuance of her lisinopril and since she has been off her lisinopril her blood pressures have been very variable sometimes on the lower side of normal sometimes in the mid normal range and sometimes elevated. She actually relates that for the month of March she was checking her blood pressure a few times a day and got significant variability. Interestingly my staff got a blood pressure 130/60 but when I rechecked her blood pressure it was 185/90 in both her left and right arm sitting. I did check her blood pressure lying and got 200/90 and standing and got 180/90 so there was no significant orthostatic change. She really denies any other cardiovascular complaints except for the shortness of breath that she had with these episodes which is largely resolved currently. Encounter Diagnoses   Name Primary?     Dizziness  Palpitations     Essential hypertension with goal blood pressure less than 140/90 Yes    Hyperlipidemia LDL goal <100         Discussion: This lady's blood pressure appears to be quite variable even during her appointment with me as indicated above. I really do not know how to explain her symptom complex unless she is having some type of arrhythmia such as atrial fibrillation causing shortness of breath and weakness, but that has never been documented as far as I can see. Her blood pressure is fairly high today and it would appear that she may need to go back on an antihypertensive, but apparently her blood pressures have been very variable over the past month. I am not going to make any changes in her blood pressure medications, but I am going to discuss her case with Dr. Nara Worthington. He has been following her for some time and she may need further workup including a renal duplex scan and possibly a workup for carcinoid or even a pheochromocytoma, but I think is the best option is to 1 person managing the workup and reinitiation of antihypertensives and I think it is more appropriate that Dr. Nara Worthington does this since he is her primary physician and she does not appear to have any cardiac issues that are going to require any further workup at this time. PCP: Sue Mccarty MD      Past Medical History:   Diagnosis Date    Allergic rhinitis     Cancer [facility])     colon    Carotid duplex study 02/08/2011    No significant stenosis >49% bilaterally.  Cold     currently with post nasal drip    Headache(784.0)     Hypercholesterolemia     Hypertension     Normal nuclear stress test 01/29/2009    No evidence of scarring or ischemia. EF 78%. No reg WMA. Neg max EST w/good exercise.     Osteopenia        Past Surgical History:   Procedure Laterality Date    COLONOSCOPY N/A 6/3/2016    COLONOSCOPY, SURVEILLANCE performed by Jamari Mejia MD at [place] [address], COLON, DIAGNOSTIC     HX BUNIONECTOMY      x 2    HX GI      colon resection    HX GYN      hysterectomy    HX HEENT      tonsillectomy    HX HEMORRHOIDECTOMY      HX HYSTERECTOMY      HX ORTHOPAEDIC  July 2015    lumbar fusion       Current Outpatient Prescriptions   Medication Sig    ALPRAZolam (XANAX) 0.25 mg tablet Take 1 Tab by mouth three (3) times daily as needed for Anxiety. Max Daily Amount: 0.75 mg.    atorvastatin (LIPITOR) 10 mg tablet TAKE 1 TABLET EVERY DAY    ipratropium (ATROVENT) 0.03 % nasal spray USE 2 SPRAYS IN EACH NOSTRIL EVERY 12 HOURS    polyethylene glycol (MIRALAX) 17 gram/dose powder Take 17 g by mouth daily.  cholecalciferol, vitamin D3, (VITAMIN D3) 2,000 unit Tab Take 1 Tab by mouth daily.  omega-3 fatty acids-vitamin e (FISH OIL) 1,000 mg Cap Take 1 Cap by mouth daily.  aspirin delayed-release 81 mg tablet Take 81 mg by mouth daily.  MULTIVITS,CA,MINERALS/IRON/FA (MULTI FOR HER PO) Take 1 Tab by mouth daily.  calcium-cholecalciferol, d3, (CALCIUM 600 + D) 600-125 mg-unit Tab Take 1 Tab by mouth daily. No current facility-administered medications for this visit. No Known Allergies    Social History :  Social History   Substance Use Topics    Smoking status: Never Smoker    Smokeless tobacco: Never Used    Alcohol use Yes      Comment: socially        Family History: family history includes Heart Attack in her brother; Heart Disease in her brother and mother; Heart Surgery in her mother; Hypertension in her brother and mother. Review of Systems:   Constitutional: Negative. HENT: Negative. Eyes: Negative. Respiratory: Negative. Cardiovascular: Negative. Gastrointestinal: Positive for constipation and heartburn. Negative for abdominal pain, blood in stool, diarrhea, melena, nausea and vomiting. Genitourinary: Negative for dysuria, flank pain, frequency, hematuria and urgency. Musculoskeletal: Positive for neck pain.  Negative for back pain, falls, joint pain and myalgias. Skin: Negative. Neurological: Positive for dizziness and headaches. Negative for tingling, tremors, sensory change, speech change, focal weakness, seizures and loss of consciousness. Endo/Heme/Allergies: Negative. Physical Exam:    The patient is a cooperative, alert, well developed, well nourished 68 y.o.  female who is in no acute distress at the time of the examination. Visit Vitals    /90 (BP 1 Location: Right arm, BP Patient Position: Sitting)    Pulse 67    Ht 5' 1\" (1.549 m)    Wt 54 kg (119 lb)    SpO2 98%    BMI 22.48 kg/m2       HEENT: Conjuctiva white, mucosa moist, no pallor or cyanosis. NECK: Supple without masses, tenderness or thyromegaly. There was no jugular venous distention. Carotid are full bilaterally without bruits. CHEST: Symmetrical with good excursion. LUNGS: Clear to auscultation in all fields. HEART: The apex is not displaced. There were no lifts, thrills or heaves. There is a normal S1 and S2 without appreciable murmurs, rubs, clicks, or gallops auscultated. ABDOMEN: Soft without masses, tenderness or organomegaly. EXTREMITIES: Full peripheral pulses without peripheral edema. INTEGUMENT: Warm and dry   NEUROLOGICAL: The patient is oriented x 3 with motor function grossly intact. Review of Data: See PMH and Cardiology and Imaging sections for cardiac testing  Lab Results   Component Value Date/Time    Cholesterol, total 174 10/20/2017 09:33 AM    HDL Cholesterol 79 (H) 10/20/2017 09:33 AM    LDL, calculated 76.6 10/20/2017 09:33 AM    Triglyceride 92 10/20/2017 09:33 AM    CHOL/HDL Ratio 2.2 10/20/2017 09:33 AM       Results for orders placed or performed in visit on 04/06/18   AMB POC EKG ROUTINE W/ 12 LEADS, INTER & REP     Status: None    Narrative    Normal sinus rhythm rate 68. This EKG is within normal limits. Kary Castellanos D.O., F.A.C.C.   [facility]  [facility] [place]  [address]      PLEASE NOTE:  This document has been produced using voice recognition software. Unrecognized errors in transcription may be present.

## 2018-04-06 NOTE — PROGRESS NOTES
1. Have you been to the ER, urgent care clinic since your last visit? Hospitalized since your last visit?no    2. Have you seen or consulted any other health care providers outside of the [address] [place] [address] since your last visit? Include any pap smears or colon screening.  no

## 2018-04-06 NOTE — PROGRESS NOTES
Review of Systems   Constitutional: Negative. HENT: Negative. Eyes: Negative. Respiratory: Negative. Cardiovascular: Negative. Gastrointestinal: Positive for constipation and heartburn. Negative for abdominal pain, blood in stool, diarrhea, melena, nausea and vomiting. Genitourinary: Negative for dysuria, flank pain, frequency, hematuria and urgency. Musculoskeletal: Positive for neck pain. Negative for back pain, falls, joint pain and myalgias. Skin: Negative. Neurological: Positive for dizziness and headaches. Negative for tingling, tremors, sensory change, speech change, focal weakness, seizures and loss of consciousness. Endo/Heme/Allergies: Negative.

## 2018-04-07 VITALS
SYSTOLIC BLOOD PRESSURE: 185 MMHG | DIASTOLIC BLOOD PRESSURE: 90 MMHG | HEART RATE: 67 BPM | HEIGHT: 61 IN | WEIGHT: 119 LBS | BODY MASS INDEX: 22.47 KG/M2 | OXYGEN SATURATION: 98 %

## 2018-05-15 RX ORDER — POLYETHYLENE GLYCOL 3350 17 G/17G
17 POWDER, FOR SOLUTION ORAL DAILY
Qty: 1530 G | Refills: 3 | Status: SHIPPED | OUTPATIENT
Start: 2018-05-15

## 2018-05-15 NOTE — TELEPHONE ENCOUNTER
Last Visit: 03/30/2018 with MD Nunez Other    Next Appointment: 08/31/2018 with MD Nunez Other   Previous Refill Encounters: 07/20/2015 per MD Anisa Smith 1530g with 3 refills     Requested Prescriptions     Pending Prescriptions Disp Refills    polyethylene glycol (MIRALAX) 17 gram/dose powder 1530 g 3     Sig: Take 17 g by mouth daily.

## 2018-06-07 ENCOUNTER — HOSPITAL ENCOUNTER (OUTPATIENT)
Dept: MAMMOGRAPHY | Age: 73
Discharge: HOME | End: 2018-06-07
Attending: INTERNAL MEDICINE
Payer: MEDICARE

## 2018-06-07 DIAGNOSIS — Z12.31 VISIT FOR SCREENING MAMMOGRAM: ICD-10-CM

## 2018-06-07 PROCEDURE — 77063 BREAST TOMOSYNTHESIS BI: CPT

## 2018-07-12 NOTE — TELEPHONE ENCOUNTER
Last Visit: 2018 with MD Sunil Birmingham    Next Appointment: 2018 with MD Sunil Birmingham   Previous Refill Encounters: 2017 per MD Gutierrez 60 mL with 3 refills     Requested Prescriptions     Pending Prescriptions Disp Refills    ipratropium (ATROVENT) 0.03 % nasal spray 60 mL 3     Si Sprays by Both Nostrils route every twelve (12) hours.

## 2018-07-13 RX ORDER — IPRATROPIUM BROMIDE 21 UG/1
2 SPRAY, METERED NASAL EVERY 12 HOURS
Qty: 60 ML | Refills: 3 | Status: SHIPPED | OUTPATIENT
Start: 2018-07-13 | End: 2019-08-20 | Stop reason: SDUPTHER

## 2018-08-17 ENCOUNTER — HOSPITAL ENCOUNTER (OUTPATIENT)
Dept: LAB | Age: 73
Discharge: HOME | End: 2018-08-17
Payer: MEDICARE

## 2018-08-17 ENCOUNTER — APPOINTMENT (OUTPATIENT)
Dept: INTERNAL MEDICINE CLINIC | Age: 73
End: 2018-08-17

## 2018-08-17 DIAGNOSIS — E78.5 HYPERLIPIDEMIA LDL GOAL <100: ICD-10-CM

## 2018-08-17 DIAGNOSIS — F41.9 ANXIETY: ICD-10-CM

## 2018-08-17 DIAGNOSIS — I10 ESSENTIAL HYPERTENSION WITH GOAL BLOOD PRESSURE LESS THAN 140/90: ICD-10-CM

## 2018-08-17 LAB
ALBUMIN SERPL-MCNC: 4.1 G/DL (ref 3.4–5)
ALBUMIN/GLOB SERPL: 1.2 {RATIO} (ref 0.8–1.7)
ALP SERPL-CCNC: 88 U/L (ref 45–117)
ALT SERPL-CCNC: 28 U/L (ref 13–56)
ANION GAP SERPL CALC-SCNC: 6 MMOL/L (ref 3–18)
AST SERPL-CCNC: 24 U/L (ref 15–37)
BASOPHILS # BLD: 0 K/UL (ref 0–0.1)
BASOPHILS NFR BLD: 1 % (ref 0–2)
BILIRUB SERPL-MCNC: 1 MG/DL (ref 0.2–1)
BUN SERPL-MCNC: 15 MG/DL (ref 7–18)
BUN/CREAT SERPL: 15 (ref 12–20)
CALCIUM SERPL-MCNC: 9 MG/DL (ref 8.5–10.1)
CHLORIDE SERPL-SCNC: 107 MMOL/L (ref 100–108)
CHOLEST SERPL-MCNC: 167 MG/DL
CO2 SERPL-SCNC: 28 MMOL/L (ref 21–32)
CREAT SERPL-MCNC: 0.97 MG/DL (ref 0.6–1.3)
DIFFERENTIAL METHOD BLD: ABNORMAL
EOSINOPHIL # BLD: 0.1 K/UL (ref 0–0.4)
EOSINOPHIL NFR BLD: 2 % (ref 0–5)
ERYTHROCYTE [DISTWIDTH] IN BLOOD BY AUTOMATED COUNT: 14.1 % (ref 11.6–14.5)
GLOBULIN SER CALC-MCNC: 3.3 G/DL (ref 2–4)
GLUCOSE SERPL-MCNC: 84 MG/DL (ref 74–99)
HCT VFR BLD AUTO: 38.1 % (ref 35–45)
HDLC SERPL-MCNC: 80 MG/DL (ref 40–60)
HDLC SERPL: 2.1 {RATIO} (ref 0–5)
HGB BLD-MCNC: 12.2 G/DL (ref 12–16)
LDLC SERPL CALC-MCNC: 75.2 MG/DL (ref 0–100)
LIPID PROFILE,FLP: ABNORMAL
LYMPHOCYTES # BLD: 1.5 K/UL (ref 0.9–3.6)
LYMPHOCYTES NFR BLD: 35 % (ref 21–52)
MCH RBC QN AUTO: 28 PG (ref 24–34)
MCHC RBC AUTO-ENTMCNC: 32 G/DL (ref 31–37)
MCV RBC AUTO: 87.4 FL (ref 74–97)
MONOCYTES # BLD: 0.4 K/UL (ref 0.05–1.2)
MONOCYTES NFR BLD: 9 % (ref 3–10)
NEUTS SEG # BLD: 2.3 K/UL (ref 1.8–8)
NEUTS SEG NFR BLD: 53 % (ref 40–73)
PLATELET # BLD AUTO: 199 K/UL (ref 135–420)
PMV BLD AUTO: 10.9 FL (ref 9.2–11.8)
POTASSIUM SERPL-SCNC: 4.3 MMOL/L (ref 3.5–5.5)
PROT SERPL-MCNC: 7.4 G/DL (ref 6.4–8.2)
RBC # BLD AUTO: 4.36 M/UL (ref 4.2–5.3)
SODIUM SERPL-SCNC: 141 MMOL/L (ref 136–145)
T4 FREE SERPL-MCNC: 1 NG/DL (ref 0.7–1.5)
TRIGL SERPL-MCNC: 59 MG/DL (ref ?–150)
TSH SERPL DL<=0.05 MIU/L-ACNC: 1.5 UIU/ML (ref 0.36–3.74)
VLDLC SERPL CALC-MCNC: 11.8 MG/DL
WBC # BLD AUTO: 4.2 K/UL (ref 4.6–13.2)

## 2018-08-17 PROCEDURE — 85025 COMPLETE CBC W/AUTO DIFF WBC: CPT | Performed by: INTERNAL MEDICINE

## 2018-08-17 PROCEDURE — 80061 LIPID PANEL: CPT | Performed by: INTERNAL MEDICINE

## 2018-08-17 PROCEDURE — 84443 ASSAY THYROID STIM HORMONE: CPT | Performed by: INTERNAL MEDICINE

## 2018-08-17 PROCEDURE — 36415 COLL VENOUS BLD VENIPUNCTURE: CPT | Performed by: INTERNAL MEDICINE

## 2018-08-17 PROCEDURE — 80053 COMPREHEN METABOLIC PANEL: CPT | Performed by: INTERNAL MEDICINE

## 2018-08-17 PROCEDURE — 84439 ASSAY OF FREE THYROXINE: CPT | Performed by: INTERNAL MEDICINE

## 2018-08-24 ENCOUNTER — OFFICE VISIT (OUTPATIENT)
Dept: INTERNAL MEDICINE CLINIC | Age: 73
End: 2018-08-24

## 2018-08-24 VITALS
BODY MASS INDEX: 21.98 KG/M2 | HEIGHT: 61 IN | TEMPERATURE: 98.2 F | RESPIRATION RATE: 12 BRPM | OXYGEN SATURATION: 96 % | SYSTOLIC BLOOD PRESSURE: 112 MMHG | WEIGHT: 116.4 LBS | DIASTOLIC BLOOD PRESSURE: 60 MMHG | HEART RATE: 68 BPM

## 2018-08-24 DIAGNOSIS — E78.5 HYPERLIPIDEMIA LDL GOAL <100: Primary | ICD-10-CM

## 2018-08-24 DIAGNOSIS — I10 ESSENTIAL HYPERTENSION WITH GOAL BLOOD PRESSURE LESS THAN 140/90: ICD-10-CM

## 2018-08-24 DIAGNOSIS — Z85.038 HISTORY OF COLON CANCER: ICD-10-CM

## 2018-08-24 DIAGNOSIS — I77.9 BILATERAL CAROTID ARTERY DISEASE (HCC): ICD-10-CM

## 2018-08-24 RX ORDER — LISINOPRIL 40 MG/1
40 TABLET ORAL DAILY
Qty: 90 TAB | Refills: 0
Start: 2018-08-24 | End: 2018-09-16 | Stop reason: SDUPTHER

## 2018-08-24 NOTE — MR AVS SNAPSHOT
[address] 
[phone] Patient: Cal Wallis MRN: [id number] WVI:[birth year] Visit Information Date & Time Provider Department Dept. Phone Encounter #  
 8/24/2018  9:15 AM Sandria Curling, MD [facility] of [address] [place] [address] [phone] [id number] Follow-up Instructions Return in about 3 months (around 11/24/2018). Upcoming Health Maintenance Date Due Influenza Age 5 to Adult 8/1/2018 MEDICARE YEARLY EXAM 8/17/2018 GLAUCOMA SCREENING Q2Y 3/1/2020 BREAST CANCER SCRN MAMMOGRAM 6/7/2020 COLONOSCOPY 6/3/2026 DTaP/Tdap/Td series (2 - Td) 3/7/2027 Allergies as of 8/24/2018  Review Complete On: 8/24/2018 By: Sandria Curling, MD  
 No Known Allergies Current Immunizations  Reviewed on 6/29/2017 Name Date Influenza High Dose Vaccine PF 11/1/2017  9:10 AM, 11/1/2016 11:12 AM, 10/23/2015, 9/23/2014  9:19 AM  
 Influenza Vaccine PF 10/10/2013 11:49 AM  
 Influenza Vaccine Split 10/17/2012 Pneumococcal Conjugate (PCV-13) 4/8/2015 Pneumococcal Polysaccharide (PPSV-23) 1/1/2009 Pneumococcal Vaccine (Unspecified Type) 1/1/2009 Td 1/1/2009 Zoster Vaccine, Live 1/1/2011 Not reviewed this visit You Were Diagnosed With   
  
 Codes Comments Hyperlipidemia LDL goal <100    -  Primary ICD-10-CM: E78.5 ICD-9-CM: 272.4 Essential hypertension with goal blood pressure less than 140/90     ICD-10-CM: I10 
ICD-9-CM: 401.9 Bilateral carotid artery disease ([address]     ICD-10-CM: I77.9 ICD-9-CM: 447.9 History of colon cancer     ICD-10-CM: Z85.038 
ICD-9-CM: V10.05 1989 Vitals BP Pulse Temp Resp Height(growth percentile) Weight(growth percentile) 112/60 68 98.2 °F (36.8 °C) (Oral) 12 5' 1\" (1.549 m) 116 lb 6.4 oz (52.8 kg) SpO2 BMI OB Status Smoking Status 96% 21.99 kg/m2 Hysterectomy Never Smoker Vitals History BMI and BSA Data Body Mass Index Body Surface Area  
 21.99 kg/m 2 1.51 m 2 Preferred Pharmacy Pharmacy Name Phone [facility] Noyola [address] [place] [address] - [address] [address] [phone] Your Updated Medication List  
  
   
This list is accurate as of 8/24/18  9:56 AM.  Always use your most recent med list.  
  
  
  
  
 ALPRAZolam 0.25 mg tablet Commonly known as:  Star Carp Take 1 Tab by mouth three (3) times daily as needed for Anxiety. Max Daily Amount: 0.75 mg.  
  
 aspirin delayed-release 81 mg tablet Take 81 mg by mouth daily. atorvastatin 10 mg tablet Commonly known as:  LIPITOR  
TAKE 1 TABLET EVERY DAY  
  
 CALCIUM 600 + D 600-125 mg-unit Tab Generic drug:  calcium-cholecalciferol (d3) Take 1 Tab by mouth daily. FISH OIL 1,000 mg Cap Generic drug:  omega-3 fatty acids-vitamin e Take 1 Cap by mouth daily. ipratropium 0.03 % nasal spray Commonly known as:  ATROVENT  
2 Sprays by Both Nostrils route every twelve (12) hours. MULTI FOR HER PO Take 1 Tab by mouth daily. polyethylene glycol 17 gram/dose powder Commonly known as:  Shelbi Cristhian Take 17 g by mouth daily. VITAMIN D3 2,000 unit Tab Generic drug:  cholecalciferol (vitamin D3) Take 1 Tab by mouth daily. Follow-up Instructions Return in about 3 months (around 11/24/2018). Introducing [facility] & HEALTH SERVICES! Dear Farzana Kendall: 
Thank you for requesting a [employer] account. Our records indicate that you already have an active [employer] account. You can access your account anytime at [email or web address] Did you know that you can access your hospital and ER discharge instructions at any time in [employer]? You can also review all of your test results from your hospital stay or ER visit. Additional Information If you have questions, please visit the Frequently Asked Questions section of the [employer] website at [email or web address] Remember, [employer] is NOT to be used for urgent needs. For medical emergencies, dial 911. Now available from your iPhone and Android! Please provide this summary of care documentation to your next provider. Your primary care clinician is listed as Katherin Corado. Alyse Merida. If you have any questions after today's visit, please call [phone].

## 2018-08-24 NOTE — PROGRESS NOTES
Clarita Blanton,born 1945, is a 68 y.o. female, who is seen today for reevaluation of labile hypertension, hyperlipidemia, history of carotid disease, mild anxiety, chronic constipation and remote history of colon cancer. She feels generally well but still has labile blood pressure. She sometimes will not take lisinopril if her blood pressure is running low such as around 80 and sometimes it stays low for 1-2 days and she feels a little weak on those days but keeps going. She definitely takes it if it goes up over 150 and we actually stop lisinopril a few months ago but she restarted 40 mg twice a day soon thereafter when blood pressure was going up into the 150 range. No dyspnea or chest pain. No neurologic symptoms. She takes an aspirin a day. She has had no recurrence of colon cancer with last colonoscopy 2 years ago. The original tumor was resected in 1989. She rarely uses Xanax. She takes atorvastatin regularly and eats a healthy diet maintains a normal weight. Past Medical History:   Diagnosis Date    Allergic rhinitis     Cancer ([facility] [address]     colon    Carotid duplex study 02/08/2011    No significant stenosis >49% bilaterally.  Cold     currently with post nasal drip    Headache(784.0)     Hypercholesterolemia     Hypertension     Normal nuclear stress test 01/29/2009    No evidence of scarring or ischemia. EF 78%. No reg WMA. Neg max EST w/good exercise.     Osteopenia      Past Surgical History:   Procedure Laterality Date    COLONOSCOPY N/A 6/3/2016    COLONOSCOPY, SURVEILLANCE performed by Jean Paul Tolbert MD at [facility] ENDOSCOPY    ENDOSCOPY, COLON, DIAGNOSTIC      HX BUNIONECTOMY      x 2    HX GI      colon resection    HX GYN      hysterectomy    HX HEENT      tonsillectomy    HX HEMORRHOIDECTOMY      HX HYSTERECTOMY      HX ORTHOPAEDIC  July 2015    lumbar fusion     Current Outpatient Prescriptions   Medication Sig Dispense Refill    ipratropium (ATROVENT) 0.03 % nasal spray 2 Sprays by Both Nostrils route every twelve (12) hours. 60 mL 3    polyethylene glycol (MIRALAX) 17 gram/dose powder Take 17 g by mouth daily. 1530 g 3    ALPRAZolam (XANAX) 0.25 mg tablet Take 1 Tab by mouth three (3) times daily as needed for Anxiety. Max Daily Amount: 0.75 mg. 60 Tab 0    atorvastatin (LIPITOR) 10 mg tablet TAKE 1 TABLET EVERY DAY 90 Tab 3    cholecalciferol, vitamin D3, (VITAMIN D3) 2,000 unit Tab Take 1 Tab by mouth daily.  omega-3 fatty acids-vitamin e (FISH OIL) 1,000 mg Cap Take 1 Cap by mouth daily.  aspirin delayed-release 81 mg tablet Take 81 mg by mouth daily.  MULTIVITS,CA,MINERALS/IRON/FA (MULTI FOR HER PO) Take 1 Tab by mouth daily.  calcium-cholecalciferol, d3, (CALCIUM 600 + D) 600-125 mg-unit Tab Take 1 Tab by mouth daily. She is also taking lisinopril 40 mg twice a day    No Known Allergies    Social History     Social History    Marital status:      Spouse name: N/A    Number of children: N/A    Years of education: N/A     Social History Main Topics    Smoking status: Never Smoker    Smokeless tobacco: Never Used    Alcohol use Yes      Comment: socially    Drug use: No    Sexual activity: Not Asked     Other Topics Concern    None     Social History Narrative     Small amount of wax in the right ear and none on the left. Oral cavity reveals no lesions. Neck reveals no adenopathy or thyromegaly. Carotids are 2+ without bruits. Lungs are clear to percussion. Good breath sounds with no wheezing or crackles. Heart reveals a regular rhythm with normal S1 and S2 no murmur gallop click or rub. Apical impulse is in the fifth interspace at the midclavicular line. Abdomen is soft and nontender with no hepatosplenomegaly or masses and no bruits. Extremities reveal no clubbing cyanosis or edema. Pulses are 2+. Breasts reveal no masses no skin or nipple abnormalities and no x-ray adenopathy.     Results for orders placed or performed during the hospital encounter of 08/17/18   CBC WITH AUTOMATED DIFF   Result Value Ref Range    WBC 4.2 (L) 4.6 - 13.2 K/uL    RBC 4.36 4.20 - 5.30 M/uL    HGB 12.2 12.0 - 16.0 g/dL    HCT 38.1 35.0 - 45.0 %    MCV 87.4 74.0 - 97.0 FL    MCH 28.0 24.0 - 34.0 PG    MCHC 32.0 31.0 - 37.0 g/dL    RDW 14.1 11.6 - 14.5 %    PLATELET 709 845 - 983 K/uL    MPV 10.9 9.2 - 11.8 FL    NEUTROPHILS 53 40 - 73 %    LYMPHOCYTES 35 21 - 52 %    MONOCYTES 9 3 - 10 %    EOSINOPHILS 2 0 - 5 %    BASOPHILS 1 0 - 2 %    ABS. NEUTROPHILS 2.3 1.8 - 8.0 K/UL    ABS. LYMPHOCYTES 1.5 0.9 - 3.6 K/UL    ABS. MONOCYTES 0.4 0.05 - 1.2 K/UL    ABS. EOSINOPHILS 0.1 0.0 - 0.4 K/UL    ABS. BASOPHILS 0.0 0.0 - 0.1 K/UL    DF AUTOMATED     LIPID PANEL   Result Value Ref Range    LIPID PROFILE          Cholesterol, total 167 <200 MG/DL    Triglyceride 59 <150 MG/DL    HDL Cholesterol 80 (H) 40 - 60 MG/DL    LDL, calculated 75.2 0 - 100 MG/DL    VLDL, calculated 11.8 MG/DL    CHOL/HDL Ratio 2.1 0 - 5.0     METABOLIC PANEL, COMPREHENSIVE   Result Value Ref Range    Sodium 141 136 - 145 mmol/L    Potassium 4.3 3.5 - 5.5 mmol/L    Chloride 107 100 - 108 mmol/L    CO2 28 21 - 32 mmol/L    Anion gap 6 3.0 - 18 mmol/L    Glucose 84 74 - 99 mg/dL    BUN 15 7.0 - 18 MG/DL    Creatinine 0.97 0.6 - 1.3 MG/DL    BUN/Creatinine ratio 15 12 - 20      GFR est AA >60 >60 ml/min/1.73m2    GFR est non-AA 56 (L) >60 ml/min/1.73m2    Calcium 9.0 8.5 - 10.1 MG/DL    Bilirubin, total 1.0 0.2 - 1.0 MG/DL    ALT (SGPT) 28 13 - 56 U/L    AST (SGOT) 24 15 - 37 U/L    Alk. phosphatase 88 45 - 117 U/L    Protein, total 7.4 6.4 - 8.2 g/dL    Albumin 4.1 3.4 - 5.0 g/dL    Globulin 3.3 2.0 - 4.0 g/dL    A-G Ratio 1.2 0.8 - 1.7     T4, FREE   Result Value Ref Range    T4, Free 1.0 0.7 - 1.5 NG/DL   TSH 3RD GENERATION   Result Value Ref Range    TSH 1.50 0.36 - 3.74 uIU/mL     Assessment: #1.   Hyperlipidemia doing well, she will continue atorvastatin 10 mg each evening. #2. History of osteopenia last checked 4 years ago, she will continue calcium with D plus vitamin D 2000 units daily. #3.  Labile hypertension currently doing very well but fairly often he goes to low and I told her again today that could cause a problem, fall hip fracture etc. so at this point she agrees to decrease lisinopril to 40 mg once a day since evenings is when her blood pressure goes the highest and monitor blood pressure at home by doing 3 readings of the time in the resting state and bring those readings and her blood pressure manometer the next visit. #4.  History of carotid disease asymptomatic with no bruits, she will continue aspirin 81 mg daily. #5.  Remote history of colon cancer 1989, colonoscopy was last done 2 years ago. She is up-to-date. #6. Anxiety doing well, she uses alprazolam 0.25 mg occasionally and will continue as needed. #6. No allergic rhinitis doing well, she will continue Atrovent nasal spray. Follow-up 3 months with blood pressure readings from home and her meter. No lab for 6 months. Bhanu Lewis MD FACP    Please note: This document has been produced using voice recognition software. Unrecognized errors in transcription may be present.

## 2018-09-17 RX ORDER — LISINOPRIL 40 MG/1
TABLET ORAL
Qty: 180 TAB | Refills: 3 | Status: SHIPPED | OUTPATIENT
Start: 2018-09-17 | End: 2019-10-03 | Stop reason: SDUPTHER

## 2018-09-17 RX ORDER — ATORVASTATIN CALCIUM 10 MG/1
TABLET, FILM COATED ORAL
Qty: 90 TAB | Refills: 3 | Status: SHIPPED | OUTPATIENT
Start: 2018-09-17 | End: 2019-08-20 | Stop reason: SDUPTHER

## 2018-11-15 ENCOUNTER — OFFICE VISIT (OUTPATIENT)
Dept: INTERNAL MEDICINE CLINIC | Age: 73
End: 2018-11-15

## 2018-11-15 VITALS
SYSTOLIC BLOOD PRESSURE: 128 MMHG | WEIGHT: 116.4 LBS | TEMPERATURE: 98.2 F | RESPIRATION RATE: 15 BRPM | HEART RATE: 62 BPM | DIASTOLIC BLOOD PRESSURE: 68 MMHG | BODY MASS INDEX: 21.98 KG/M2 | OXYGEN SATURATION: 100 % | HEIGHT: 61 IN

## 2018-11-15 DIAGNOSIS — Z23 ENCOUNTER FOR IMMUNIZATION: ICD-10-CM

## 2018-11-15 DIAGNOSIS — I10 ESSENTIAL HYPERTENSION WITH GOAL BLOOD PRESSURE LESS THAN 140/90: Primary | ICD-10-CM

## 2018-11-15 DIAGNOSIS — E78.5 HYPERLIPIDEMIA LDL GOAL <100: ICD-10-CM

## 2018-11-15 DIAGNOSIS — I65.23 BILATERAL CAROTID ARTERY STENOSIS: ICD-10-CM

## 2018-11-15 NOTE — PROGRESS NOTES
Clarita Blanton,born 1945, is a 68 y.o. female, who is seen today for reevaluation of labile hypertension hyperlipidemia history of carotid disease and anxiety. She has been checking her blood pressure at home morning and evening and brings in over 20 readings from the last 3 months. Maximum reading was 152 on one occasion on 2 occasions 149 and all the others have been normal, sometimes low normal as low as 93 systolic. She feels well but she wants to know about vaccinations in which she still needs. Past Medical History:   Diagnosis Date    Allergic rhinitis     Cancer ([address]     colon    Carotid duplex study 02/08/2011    No significant stenosis >49% bilaterally.  Cold     currently with post nasal drip    Headache(784.0)     Hypercholesterolemia     Hypertension     Normal nuclear stress test 01/29/2009    No evidence of scarring or ischemia. EF 78%. No reg WMA. Neg max EST w/good exercise.  Osteopenia      Current Outpatient Medications   Medication Sig Dispense Refill    lisinopril (PRINIVIL, ZESTRIL) 40 mg tablet TAKE 1 TABLET TWICE DAILY 180 Tab 3    atorvastatin (LIPITOR) 10 mg tablet TAKE 1 TABLET EVERY DAY 90 Tab 3    ipratropium (ATROVENT) 0.03 % nasal spray 2 Sprays by Both Nostrils route every twelve (12) hours. 60 mL 3    polyethylene glycol (MIRALAX) 17 gram/dose powder Take 17 g by mouth daily. 1530 g 3    ALPRAZolam (XANAX) 0.25 mg tablet Take 1 Tab by mouth three (3) times daily as needed for Anxiety. Max Daily Amount: 0.75 mg. 60 Tab 0    cholecalciferol, vitamin D3, (VITAMIN D3) 2,000 unit Tab Take 1 Tab by mouth daily.  omega-3 fatty acids-vitamin e (FISH OIL) 1,000 mg Cap Take 1 Cap by mouth daily.  aspirin delayed-release 81 mg tablet Take 81 mg by mouth daily.  MULTIVITS,CA,MINERALS/IRON/FA (MULTI FOR HER PO) Take 1 Tab by mouth daily.  calcium-cholecalciferol, d3, (CALCIUM 600 + D) 600-125 mg-unit Tab Take 1 Tab by mouth daily. Visit Vitals  /68 (BP 1 Location: Left arm, BP Patient Position: Sitting)   Pulse 62   Temp 98.2 °F (36.8 °C) (Oral)   Resp 15   Ht 5' 1\" (1.549 m)   Wt 116 lb 6.4 oz (52.8 kg)   SpO2 100%   BMI 21.99 kg/m²     Carotids are 2+ without bruits. Lungs are clear to percussion. Good breath sounds with no wheezing or crackles. Heart reveals a regular rhythm with normal S1 and S2 no murmur gallop click or rub. Apical impulse is in the fifth interspace at the midclavicular line. Abdomen is soft and nontender with no hepatosplenomegaly or masses and no bruits. Extremities reveal no clubbing cyanosis or edema. Pulses are 2+. Assessment: #1.  Labile hypertension currently well controlled with change in medication. She will continue lisinopril 20 mg daily and no added salt diet and monitor blood pressure every 1-2 weeks at home. #2. Hyperlipidemia has been doing well, she will continue atorvastatin 10 mg each evening. #3.  Carotid stenosis, she is asymptomatic and will continue aspirin daily. We will update PPSV 23 since she was less than age 72 when she received that in 2009, she will get a flu shot now and she will get the new shingles vaccination from her pharmacist, explained the process of having that done. Follow-up 3 months with lab      [address] R. Maddie Gaming MD FACP    Please note: This document has been produced using voice recognition software. Unrecognized errors in transcription may be present.

## 2018-11-15 NOTE — PROGRESS NOTES
1. Have you been to the ER, urgent care clinic or hospitalized since your last visit? NO           2. Have you seen or consulted any other health care providers outside of the [address] [place] [address] since your last visit (Include any pap smears or colon screening)? NO    Do you have an Advanced Directive? YES    Would you like information on Advanced Directives?  NO

## 2019-04-08 DIAGNOSIS — F41.9 ANXIETY: Primary | ICD-10-CM

## 2019-04-08 RX ORDER — ALPRAZOLAM 0.25 MG/1
0.25 TABLET ORAL
Qty: 60 TAB | Refills: 0 | OUTPATIENT
Start: 2019-04-08 | End: 2021-09-20 | Stop reason: SDUPTHER

## 2019-04-08 NOTE — TELEPHONE ENCOUNTER
Last Visit: 11-15-18 with MD Thuy Santiago  Next Appointment: 4-29-19 with MD Thuy Santiago  Previous Refill Encounter(s): 11-1-17 #60    Requested Prescriptions     Pending Prescriptions Disp Refills    ALPRAZolam (XANAX) 0.25 mg tablet 60 Tab 0     Sig: Take 1 Tab by mouth three (3) times daily as needed for Anxiety. Max Daily Amount: 0.75 mg.

## 2019-04-22 ENCOUNTER — HOSPITAL ENCOUNTER (OUTPATIENT)
Dept: LAB | Age: 74
Discharge: HOME | End: 2019-04-22
Payer: MEDICARE

## 2019-04-22 DIAGNOSIS — F41.9 ANXIETY: ICD-10-CM

## 2019-04-22 DIAGNOSIS — M85.80 OSTEOPENIA, UNSPECIFIED LOCATION: ICD-10-CM

## 2019-04-22 DIAGNOSIS — E78.5 HYPERLIPIDEMIA LDL GOAL <100: ICD-10-CM

## 2019-04-22 DIAGNOSIS — I10 ESSENTIAL HYPERTENSION WITH GOAL BLOOD PRESSURE LESS THAN 140/90: ICD-10-CM

## 2019-04-22 LAB
ALBUMIN SERPL-MCNC: 4.4 G/DL (ref 3.4–5)
ALBUMIN/GLOB SERPL: 1.4 {RATIO} (ref 0.8–1.7)
ALP SERPL-CCNC: 94 U/L (ref 45–117)
ALT SERPL-CCNC: 26 U/L (ref 13–56)
ANION GAP SERPL CALC-SCNC: 7 MMOL/L (ref 3–18)
AST SERPL-CCNC: 24 U/L (ref 15–37)
BASOPHILS # BLD: 0 K/UL (ref 0–0.1)
BASOPHILS NFR BLD: 1 % (ref 0–2)
BILIRUB SERPL-MCNC: 0.9 MG/DL (ref 0.2–1)
BUN SERPL-MCNC: 11 MG/DL (ref 7–18)
BUN/CREAT SERPL: 13 (ref 12–20)
CALCIUM SERPL-MCNC: 8.9 MG/DL (ref 8.5–10.1)
CHLORIDE SERPL-SCNC: 107 MMOL/L (ref 100–108)
CHOLEST SERPL-MCNC: 181 MG/DL
CO2 SERPL-SCNC: 27 MMOL/L (ref 21–32)
CREAT SERPL-MCNC: 0.84 MG/DL (ref 0.6–1.3)
DIFFERENTIAL METHOD BLD: ABNORMAL
EOSINOPHIL # BLD: 0.1 K/UL (ref 0–0.4)
EOSINOPHIL NFR BLD: 2 % (ref 0–5)
ERYTHROCYTE [DISTWIDTH] IN BLOOD BY AUTOMATED COUNT: 14.1 % (ref 11.6–14.5)
GLOBULIN SER CALC-MCNC: 3.1 G/DL (ref 2–4)
GLUCOSE SERPL-MCNC: 90 MG/DL (ref 74–99)
HCT VFR BLD AUTO: 36.7 % (ref 35–45)
HDLC SERPL-MCNC: 98 MG/DL (ref 40–60)
HDLC SERPL: 1.8 {RATIO} (ref 0–5)
HGB BLD-MCNC: 11.7 G/DL (ref 12–16)
LDLC SERPL CALC-MCNC: 73.2 MG/DL (ref 0–100)
LIPID PROFILE,FLP: ABNORMAL
LYMPHOCYTES # BLD: 1.5 K/UL (ref 0.9–3.6)
LYMPHOCYTES NFR BLD: 40 % (ref 21–52)
MCH RBC QN AUTO: 28.5 PG (ref 24–34)
MCHC RBC AUTO-ENTMCNC: 31.9 G/DL (ref 31–37)
MCV RBC AUTO: 89.3 FL (ref 74–97)
MONOCYTES # BLD: 0.4 K/UL (ref 0.05–1.2)
MONOCYTES NFR BLD: 9 % (ref 3–10)
NEUTS SEG # BLD: 1.9 K/UL (ref 1.8–8)
NEUTS SEG NFR BLD: 48 % (ref 40–73)
PLATELET # BLD AUTO: 217 K/UL (ref 135–420)
PMV BLD AUTO: 10.9 FL (ref 9.2–11.8)
POTASSIUM SERPL-SCNC: 4 MMOL/L (ref 3.5–5.5)
PROT SERPL-MCNC: 7.5 G/DL (ref 6.4–8.2)
RBC # BLD AUTO: 4.11 M/UL (ref 4.2–5.3)
SODIUM SERPL-SCNC: 141 MMOL/L (ref 136–145)
TRIGL SERPL-MCNC: 49 MG/DL (ref ?–150)
TSH SERPL DL<=0.05 MIU/L-ACNC: 0.96 UIU/ML (ref 0.36–3.74)
VLDLC SERPL CALC-MCNC: 9.8 MG/DL
WBC # BLD AUTO: 3.9 K/UL (ref 4.6–13.2)

## 2019-04-22 PROCEDURE — 84443 ASSAY THYROID STIM HORMONE: CPT

## 2019-04-22 PROCEDURE — 80053 COMPREHEN METABOLIC PANEL: CPT

## 2019-04-22 PROCEDURE — 84439 ASSAY OF FREE THYROXINE: CPT

## 2019-04-22 PROCEDURE — 80061 LIPID PANEL: CPT

## 2019-04-22 PROCEDURE — 85025 COMPLETE CBC W/AUTO DIFF WBC: CPT

## 2019-04-22 PROCEDURE — 36415 COLL VENOUS BLD VENIPUNCTURE: CPT

## 2019-04-24 LAB — T4 FREE SERPL-MCNC: 1.1 NG/DL (ref 0.7–1.5)

## 2019-04-29 ENCOUNTER — OFFICE VISIT (OUTPATIENT)
Dept: INTERNAL MEDICINE CLINIC | Age: 74
End: 2019-04-29

## 2019-04-29 VITALS
RESPIRATION RATE: 12 BRPM | WEIGHT: 107.8 LBS | TEMPERATURE: 98.2 F | BODY MASS INDEX: 20.35 KG/M2 | HEART RATE: 81 BPM | DIASTOLIC BLOOD PRESSURE: 78 MMHG | SYSTOLIC BLOOD PRESSURE: 138 MMHG | HEIGHT: 61 IN | OXYGEN SATURATION: 100 %

## 2019-04-29 DIAGNOSIS — F32.A DEPRESSION, ACUTE: Primary | ICD-10-CM

## 2019-04-29 DIAGNOSIS — I10 ESSENTIAL HYPERTENSION WITH GOAL BLOOD PRESSURE LESS THAN 140/90: ICD-10-CM

## 2019-04-29 DIAGNOSIS — E78.5 HYPERLIPIDEMIA LDL GOAL <100: ICD-10-CM

## 2019-04-29 NOTE — PROGRESS NOTES
Clarita Blanton,born 1945, is a 76 y.o. female, who is seen today for reevaluation of hypertension, anxiety, hyperlipidemia and chronic constipation. Her mother  a little over 2 months ago and she has been tearful every day since then. It has contributed to difficulty with getting to sleep so she has been using her low-dose Xanax every night to get to sleep but never uses it in the daytime. She takes her other medicine correctly. No chest pain or dyspnea. The  is coming up in May. Past Medical History:   Diagnosis Date    Allergic rhinitis     Cancer ([facility] [address]     colon    Carotid duplex study 2011    No significant stenosis >49% bilaterally.  Cold     currently with post nasal drip    Headache(784.0)     Hypercholesterolemia     Hypertension     Normal nuclear stress test 2009    No evidence of scarring or ischemia. EF 78%. No reg WMA. Neg max EST w/good exercise.  Osteopenia      Current Outpatient Medications   Medication Sig Dispense Refill    ALPRAZolam (XANAX) 0.25 mg tablet Take 1 Tab by mouth three (3) times daily as needed for Anxiety. Max Daily Amount: 0.75 mg. 60 Tab 0    lisinopril (PRINIVIL, ZESTRIL) 40 mg tablet TAKE 1 TABLET TWICE DAILY 180 Tab 3    atorvastatin (LIPITOR) 10 mg tablet TAKE 1 TABLET EVERY DAY 90 Tab 3    ipratropium (ATROVENT) 0.03 % nasal spray 2 Sprays by Both Nostrils route every twelve (12) hours. 60 mL 3    polyethylene glycol (MIRALAX) 17 gram/dose powder Take 17 g by mouth daily. 1530 g 3    cholecalciferol, vitamin D3, (VITAMIN D3) 2,000 unit Tab Take 1 Tab by mouth daily.  omega-3 fatty acids-vitamin e (FISH OIL) 1,000 mg Cap Take 1 Cap by mouth daily.  aspirin delayed-release 81 mg tablet Take 81 mg by mouth daily.  MULTIVITS,CA,MINERALS/IRON/FA (MULTI FOR HER PO) Take 1 Tab by mouth daily.  calcium-cholecalciferol, d3, (CALCIUM 600 + D) 600-125 mg-unit Tab Take 1 Tab by mouth daily.        Visit Vitals  /78 (BP 1 Location: Right arm, BP Patient Position: Sitting)   Pulse 81   Temp 98.2 °F (36.8 °C) (Oral)   Resp 12   Ht 5' 1\" (1.549 m)   Wt 107 lb 12.8 oz (48.9 kg)   SpO2 100%   BMI 20.37 kg/m²     Carotids are 2+ without bruits. Lungs are clear to percussion. Good breath sounds with no wheezing or crackles. Heart reveals a regular rhythm with normal S1 and S2 no murmur gallop click or rub. Apical impulse is not palpable. Abdomen is soft and nontender with no hepatosplenic megaly or masses and no bruits. Extremities reveal no clubbing cyanosis or edema. Pulses are 2+. Results for orders placed or performed during the hospital encounter of 04/22/19   CBC WITH AUTOMATED DIFF   Result Value Ref Range    WBC 3.9 (L) 4.6 - 13.2 K/uL    RBC 4.11 (L) 4.20 - 5.30 M/uL    HGB 11.7 (L) 12.0 - 16.0 g/dL    HCT 36.7 35.0 - 45.0 %    MCV 89.3 74.0 - 97.0 FL    MCH 28.5 24.0 - 34.0 PG    MCHC 31.9 31.0 - 37.0 g/dL    RDW 14.1 11.6 - 14.5 %    PLATELET 573 347 - 205 K/uL    MPV 10.9 9.2 - 11.8 FL    NEUTROPHILS 48 40 - 73 %    LYMPHOCYTES 40 21 - 52 %    MONOCYTES 9 3 - 10 %    EOSINOPHILS 2 0 - 5 %    BASOPHILS 1 0 - 2 %    ABS. NEUTROPHILS 1.9 1.8 - 8.0 K/UL    ABS. LYMPHOCYTES 1.5 0.9 - 3.6 K/UL    ABS. MONOCYTES 0.4 0.05 - 1.2 K/UL    ABS. EOSINOPHILS 0.1 0.0 - 0.4 K/UL    ABS. BASOPHILS 0.0 0.0 - 0.1 K/UL    DF AUTOMATED     METABOLIC PANEL, COMPREHENSIVE   Result Value Ref Range    Sodium 141 136 - 145 mmol/L    Potassium 4.0 3.5 - 5.5 mmol/L    Chloride 107 100 - 108 mmol/L    CO2 27 21 - 32 mmol/L    Anion gap 7 3.0 - 18 mmol/L    Glucose 90 74 - 99 mg/dL    BUN 11 7.0 - 18 MG/DL    Creatinine 0.84 0.6 - 1.3 MG/DL    BUN/Creatinine ratio 13 12 - 20      GFR est AA >60 >60 ml/min/1.73m2    GFR est non-AA >60 >60 ml/min/1.73m2    Calcium 8.9 8.5 - 10.1 MG/DL    Bilirubin, total 0.9 0.2 - 1.0 MG/DL    ALT (SGPT) 26 13 - 56 U/L    AST (SGOT) 24 15 - 37 U/L    Alk.  phosphatase 94 45 - 117 U/L Protein, total 7.5 6.4 - 8.2 g/dL    Albumin 4.4 3.4 - 5.0 g/dL    Globulin 3.1 2.0 - 4.0 g/dL    A-G Ratio 1.4 0.8 - 1.7     LIPID PANEL   Result Value Ref Range    LIPID PROFILE          Cholesterol, total 181 <200 MG/DL    Triglyceride 49 <150 MG/DL    HDL Cholesterol 98 (H) 40 - 60 MG/DL    LDL, calculated 73.2 0 - 100 MG/DL    VLDL, calculated 9.8 MG/DL    CHOL/HDL Ratio 1.8 0 - 5.0     TSH 3RD GENERATION   Result Value Ref Range    TSH 0.96 0.36 - 3.74 uIU/mL   T4, FREE   Result Value Ref Range    T4, Free 1.1 0.7 - 1.5 NG/DL     Assessment: Hypertension doing well, she will continue she is currently been fairly depressed over her mother's death about 2 months ago. She is also quite anxious daytime and can get to sleep at night. She will continue using alprazolam at bedtime and I recommended she try using it once in the daytime as well for anxiety on a day she is not going to be driving or cutting the grass or anything active. If she tolerates it well and is not very sedated she can use that on the day of her mother's .  I also told her that after the  if she is still feeling depressed over the next few weeks she strongly consider antidepressant medication, probably low-dose Lexapro and explained how that works that it is not habit-forming and usually does not cause sedation or other significant side effects. #2. Hypertension is well controlled. She will continue lisinopril 40 mg daily. #3. Hyperlipidemia doing well, she will continue atorvastatin 10 mg daily. #4.  Chronic constipation doing well, she will continue MiraLAX. Follow-up in 6 months for complete evaluation but I have asked her to make an appointment if she is not doing well in the coming weeks after her mother's .    Evie Corrales. Sherry Chery MD FACP    Please note: This document has been produced using voice recognition software. Unrecognized errors in transcription may be present.

## 2019-04-29 NOTE — PROGRESS NOTES
Chief Complaint   Patient presents with    Hypertension     6 month follow up   ROOM  10    Labs     done 4-22-19        1. Have you been to the ER, urgent care clinic since your last visit? Hospitalized since your last visit? No    2. Have you seen or consulted any other health care providers outside of the [address] [place] [address] since your last visit? Include any pap smears or colon screening.  No      Health Maintenance Due   Topic Date Due    Shingrix Vaccine Age 49> (1 of 2) 01/04/1995    MEDICARE YEARLY EXAM  08/17/2018

## 2019-04-29 NOTE — PATIENT INSTRUCTIONS
Health Maintenance Due   Topic Date Due    Shingrix Vaccine Age 49> (1 of 2) 01/04/1995    MEDICARE YEARLY EXAM  08/17/2018

## 2019-06-25 ENCOUNTER — HOSPITAL ENCOUNTER (OUTPATIENT)
Dept: MAMMOGRAPHY | Age: 74
Discharge: HOME | End: 2019-06-25
Attending: INTERNAL MEDICINE
Payer: MEDICARE

## 2019-06-25 DIAGNOSIS — Z12.31 VISIT FOR SCREENING MAMMOGRAM: ICD-10-CM

## 2019-06-25 PROCEDURE — 77063 BREAST TOMOSYNTHESIS BI: CPT

## 2019-08-21 RX ORDER — ATORVASTATIN CALCIUM 10 MG/1
TABLET, FILM COATED ORAL
Qty: 90 TAB | Refills: 3 | Status: SHIPPED | OUTPATIENT
Start: 2019-08-21 | End: 2020-11-05 | Stop reason: SDUPTHER

## 2019-08-21 RX ORDER — IPRATROPIUM BROMIDE 21 UG/1
SPRAY, METERED NASAL
Qty: 60 ML | Refills: 3 | Status: SHIPPED | OUTPATIENT
Start: 2019-08-21 | End: 2020-06-18

## 2019-10-03 RX ORDER — LISINOPRIL 40 MG/1
TABLET ORAL
Qty: 180 TAB | Refills: 3 | Status: SHIPPED | OUTPATIENT
Start: 2019-10-03 | End: 2021-08-10 | Stop reason: SDUPTHER

## 2019-10-17 ENCOUNTER — APPOINTMENT (OUTPATIENT)
Dept: INTERNAL MEDICINE CLINIC | Age: 74
End: 2019-10-17

## 2019-10-17 ENCOUNTER — HOSPITAL ENCOUNTER (OUTPATIENT)
Dept: LAB | Age: 74
Discharge: HOME | End: 2019-10-17
Payer: MEDICARE

## 2019-10-17 DIAGNOSIS — I10 ESSENTIAL HYPERTENSION WITH GOAL BLOOD PRESSURE LESS THAN 140/90: ICD-10-CM

## 2019-10-17 DIAGNOSIS — E78.5 HYPERLIPIDEMIA LDL GOAL <100: ICD-10-CM

## 2019-10-17 LAB
ALBUMIN SERPL-MCNC: 4.2 G/DL (ref 3.4–5)
ALBUMIN/GLOB SERPL: 1.2 {RATIO} (ref 0.8–1.7)
ALP SERPL-CCNC: 82 U/L (ref 45–117)
ALT SERPL-CCNC: 27 U/L (ref 13–56)
ANION GAP SERPL CALC-SCNC: 8 MMOL/L (ref 3–18)
AST SERPL-CCNC: 22 U/L (ref 10–38)
BASOPHILS # BLD: 0 K/UL (ref 0–0.1)
BASOPHILS NFR BLD: 1 % (ref 0–2)
BILIRUB SERPL-MCNC: 1 MG/DL (ref 0.2–1)
BUN SERPL-MCNC: 14 MG/DL (ref 7–18)
BUN/CREAT SERPL: 16 (ref 12–20)
CALCIUM SERPL-MCNC: 9.3 MG/DL (ref 8.5–10.1)
CHLORIDE SERPL-SCNC: 105 MMOL/L (ref 100–111)
CO2 SERPL-SCNC: 29 MMOL/L (ref 21–32)
CREAT SERPL-MCNC: 0.9 MG/DL (ref 0.6–1.3)
DIFFERENTIAL METHOD BLD: ABNORMAL
EOSINOPHIL # BLD: 0.2 K/UL (ref 0–0.4)
EOSINOPHIL NFR BLD: 3 % (ref 0–5)
ERYTHROCYTE [DISTWIDTH] IN BLOOD BY AUTOMATED COUNT: 14.1 % (ref 11.6–14.5)
GLOBULIN SER CALC-MCNC: 3.4 G/DL (ref 2–4)
GLUCOSE SERPL-MCNC: 86 MG/DL (ref 74–99)
HCT VFR BLD AUTO: 37.5 % (ref 35–45)
HGB BLD-MCNC: 11.9 G/DL (ref 12–16)
LYMPHOCYTES # BLD: 1.7 K/UL (ref 0.9–3.6)
LYMPHOCYTES NFR BLD: 36 % (ref 21–52)
MCH RBC QN AUTO: 28.7 PG (ref 24–34)
MCHC RBC AUTO-ENTMCNC: 31.7 G/DL (ref 31–37)
MCV RBC AUTO: 90.6 FL (ref 74–97)
MONOCYTES # BLD: 0.5 K/UL (ref 0.05–1.2)
MONOCYTES NFR BLD: 10 % (ref 3–10)
NEUTS SEG # BLD: 2.4 K/UL (ref 1.8–8)
NEUTS SEG NFR BLD: 50 % (ref 40–73)
PLATELET # BLD AUTO: 222 K/UL (ref 135–420)
PMV BLD AUTO: 10.7 FL (ref 9.2–11.8)
POTASSIUM SERPL-SCNC: 3.9 MMOL/L (ref 3.5–5.5)
PROT SERPL-MCNC: 7.6 G/DL (ref 6.4–8.2)
RBC # BLD AUTO: 4.14 M/UL (ref 4.2–5.3)
SODIUM SERPL-SCNC: 142 MMOL/L (ref 136–145)
WBC # BLD AUTO: 4.7 K/UL (ref 4.6–13.2)

## 2019-10-17 PROCEDURE — 36415 COLL VENOUS BLD VENIPUNCTURE: CPT

## 2019-10-17 PROCEDURE — 80053 COMPREHEN METABOLIC PANEL: CPT

## 2019-10-17 PROCEDURE — 80061 LIPID PANEL: CPT

## 2019-10-17 PROCEDURE — 85025 COMPLETE CBC W/AUTO DIFF WBC: CPT

## 2019-10-18 LAB
CHOLEST SERPL-MCNC: 201 MG/DL
HDLC SERPL-MCNC: 78 MG/DL (ref 40–60)
HDLC SERPL: 2.6 {RATIO} (ref 0–5)
LDLC SERPL CALC-MCNC: 107.8 MG/DL (ref 0–100)
LIPID PROFILE,FLP: ABNORMAL
TRIGL SERPL-MCNC: 76 MG/DL (ref ?–150)
VLDLC SERPL CALC-MCNC: 15.2 MG/DL

## 2019-10-24 ENCOUNTER — OFFICE VISIT (OUTPATIENT)
Dept: INTERNAL MEDICINE CLINIC | Age: 74
End: 2019-10-24

## 2019-10-24 VITALS
TEMPERATURE: 98.6 F | HEART RATE: 70 BPM | DIASTOLIC BLOOD PRESSURE: 68 MMHG | RESPIRATION RATE: 14 BRPM | SYSTOLIC BLOOD PRESSURE: 106 MMHG | HEIGHT: 61 IN | WEIGHT: 112.6 LBS | BODY MASS INDEX: 21.26 KG/M2 | OXYGEN SATURATION: 97 %

## 2019-10-24 DIAGNOSIS — E78.5 HYPERLIPIDEMIA LDL GOAL <100: Primary | ICD-10-CM

## 2019-10-24 DIAGNOSIS — Z23 ENCOUNTER FOR IMMUNIZATION: ICD-10-CM

## 2019-10-24 DIAGNOSIS — I10 PRIMARY HYPERTENSION: ICD-10-CM

## 2019-10-24 DIAGNOSIS — F41.9 ANXIETY: ICD-10-CM

## 2019-10-24 NOTE — PROGRESS NOTES
Larissa Macias presents today for   Chief Complaint   Patient presents with    Hypertension     Patient here for physical     RM 10    Labs     Completed on 10/17/2019              Depression Screening:  3 most recent PHQ Screens 4/29/2019   Little interest or pleasure in doing things Not at all   Feeling down, depressed, irritable, or hopeless Nearly every day   Total Score PHQ 2 3   Trouble falling or staying asleep, or sleeping too much Not at all   Feeling tired or having little energy Several days   Poor appetite, weight loss, or overeating Nearly every day   Feeling bad about yourself - or that you are a failure or have let yourself or your family down Not at all   Trouble concentrating on things such as school, work, reading, or watching TV Several days   Moving or speaking so slowly that other people could have noticed; or the opposite being so fidgety that others notice Not at all   Thoughts of being better off dead, or hurting yourself in some way Not at all   PHQ 9 Score 8   How difficult have these problems made it for you to do your work, take care of your home and get along with others Somewhat difficult       Learning Assessment:  Learning Assessment 4/29/2019   PRIMARY LEARNER Patient   HIGHEST LEVEL OF EDUCATION - PRIMARY LEARNER  GRADUATED HIGH SCHOOL OR GED   BARRIERS PRIMARY LEARNER NONE   CO-LEARNER CAREGIVER No   PRIMARY LANGUAGE ENGLISH   LEARNER PREFERENCE PRIMARY DEMONSTRATION     -     -   ANSWERED BY patient   RELATIONSHIP SELF       Abuse Screening:  Abuse Screening Questionnaire 4/29/2019   Do you ever feel afraid of your partner? N   Are you in a relationship with someone who physically or mentally threatens you? N   Is it safe for you to go home? Y       Fall Risk  Fall Risk Assessment, last 12 mths 4/29/2019   Able to walk? Yes   Fall in past 12 months? No           Coordination of Care:  1. Have you been to the ER, urgent care clinic since your last visit?    Hospitalized since your last visit? NO    2. Have you seen or consulted any other health care providers outside of the [address] [place] [address] since your last visit? Include any pap smears or colon screening. NO      Advance Directive:  1. Do you have an advance directive in place?  Patient Reply:YES      2.  Per patient no changes to their ACP contact NO.

## 2019-10-24 NOTE — PROGRESS NOTES
Clarita Blanton,born 1945, is a 76 y.o. female, who is seen today for reevaluation of hypertension hyperlipidemia anxiety chronic constipation. She feels well and is having no chest pain or dyspnea. She says she takes all of her medicine correctly. She uses alprazolam when needed which is just occasionally. Constipation is well controlled with polyethylene glycol. Past Medical History:   Diagnosis Date    Allergic rhinitis     Cancer ([address]     colon    Carotid duplex study 02/08/2011    No significant stenosis >49% bilaterally.  Cold     currently with post nasal drip    Headache(784.0)     Hypercholesterolemia     Hypertension     Normal nuclear stress test 01/29/2009    No evidence of scarring or ischemia. EF 78%. No reg WMA. Neg max EST w/good exercise.  Osteopenia      Past Surgical History:   Procedure Laterality Date    COLONOSCOPY N/A 6/3/2016    COLONOSCOPY, SURVEILLANCE performed by Dara Ho [facility] at [facility] ENDOSCOPY    ENDOSCOPY, COLON, DIAGNOSTIC      HX BUNIONECTOMY      x 2    HX COLONOSCOPY      Done by Dr. Sandra Calixto HX GI      colon resection    HX GYN      hysterectomy    HX HEENT      tonsillectomy    HX HEMORRHOIDECTOMY      HX HYSTERECTOMY      HX ORTHOPAEDIC  July 2015    lumbar fusion     Current Outpatient Medications   Medication Sig Dispense Refill    lisinopril (PRINIVIL, ZESTRIL) 40 mg tablet TAKE 1 TABLET TWICE DAILY 180 Tab 3    ipratropium (ATROVENT) 0.03 % nasal spray USE 2 SPRAYS BY BOTH NOSTRILS ROUTE EVERY TWELVE (12) HOURS. 60 mL 3    atorvastatin (LIPITOR) 10 mg tablet TAKE 1 TABLET EVERY DAY 90 Tab 3    ALPRAZolam (XANAX) 0.25 mg tablet Take 1 Tab by mouth three (3) times daily as needed for Anxiety. Max Daily Amount: 0.75 mg. 60 Tab 0    polyethylene glycol (MIRALAX) 17 gram/dose powder Take 17 g by mouth daily. 1530 g 3    cholecalciferol, vitamin D3, (VITAMIN D3) 2,000 unit Tab Take 1 Tab by mouth daily.         omega-3 fatty acids-vitamin e (FISH OIL) 1,000 mg Cap Take 1 Cap by mouth daily.  aspirin delayed-release 81 mg tablet Take 81 mg by mouth daily.  MULTIVITS,CA,MINERALS/IRON/FA (MULTI FOR HER PO) Take 1 Tab by mouth daily.  calcium-cholecalciferol, d3, (CALCIUM 600 + D) 600-125 mg-unit Tab Take 1 Tab by mouth daily. No Known Allergies  Social History     Socioeconomic History    Marital status:      Spouse name: Not on file    Number of children: Not on file    Years of education: Not on file    Highest education level: Not on file   Tobacco Use    Smoking status: Never Smoker    Smokeless tobacco: Never Used   Substance and Sexual Activity    Alcohol use: Yes     Comment: socially    Drug use: No     Visit Vitals  /68 (BP 1 Location: Left arm, BP Patient Position: Sitting)   Pulse 70   Temp 98.6 °F (37 °C) (Oral)   Resp 14   Ht 5' 1\" (1.549 m)   Wt 112 lb 9.6 oz (51.1 kg)   SpO2 97%   BMI 21.28 kg/m²     Ear canals and tympanic membranes appear normal with no wax. Oral cavity reveals no lesions. Neck reveals no adenopathy or thyromegaly. Carotids are 2+ without bruits. Lungs are clear to percussion. Good breath sounds with no wheezing or crackles. Heart reveals a regular rhythm with normal S1 and S2 no murmur gallop click or rub. Apical impulse is in the fifth interspace at the midclavicular line. Abdomen is soft and nontender with no hepatosplenomegaly or masses and no bruits. Extremities reveal no clubbing cyanosis or edema. Pulses are 2+. Breasts are fairly dense but reveal no masses no skin or nipple abnormalities and no axillary adenopathy.         Results for orders placed or performed during the hospital encounter of 10/17/19   LIPID PANEL   Result Value Ref Range    LIPID PROFILE          Cholesterol, total 201 (H) <200 MG/DL    Triglyceride 76 <150 MG/DL    HDL Cholesterol 78 (H) 40 - 60 MG/DL    LDL, calculated 107.8 (H) 0 - 100 MG/DL    VLDL, calculated 15.2 MG/DL    CHOL/HDL Ratio 2.6 0 - 5.0     CBC WITH AUTOMATED DIFF   Result Value Ref Range    WBC 4.7 4.6 - 13.2 K/uL    RBC 4.14 (L) 4.20 - 5.30 M/uL    HGB 11.9 (L) 12.0 - 16.0 g/dL    HCT 37.5 35.0 - 45.0 %    MCV 90.6 74.0 - 97.0 FL    MCH 28.7 24.0 - 34.0 PG    MCHC 31.7 31.0 - 37.0 g/dL    RDW 14.1 11.6 - 14.5 %    PLATELET 096 600 - 055 K/uL    MPV 10.7 9.2 - 11.8 FL    NEUTROPHILS 50 40 - 73 %    LYMPHOCYTES 36 21 - 52 %    MONOCYTES 10 3 - 10 %    EOSINOPHILS 3 0 - 5 %    BASOPHILS 1 0 - 2 %    ABS. NEUTROPHILS 2.4 1.8 - 8.0 K/UL    ABS. LYMPHOCYTES 1.7 0.9 - 3.6 K/UL    ABS. MONOCYTES 0.5 0.05 - 1.2 K/UL    ABS. EOSINOPHILS 0.2 0.0 - 0.4 K/UL    ABS. BASOPHILS 0.0 0.0 - 0.1 K/UL    DF AUTOMATED     METABOLIC PANEL, COMPREHENSIVE   Result Value Ref Range    Sodium 142 136 - 145 mmol/L    Potassium 3.9 3.5 - 5.5 mmol/L    Chloride 105 100 - 111 mmol/L    CO2 29 21 - 32 mmol/L    Anion gap 8 3.0 - 18 mmol/L    Glucose 86 74 - 99 mg/dL    BUN 14 7.0 - 18 MG/DL    Creatinine 0.90 0.6 - 1.3 MG/DL    BUN/Creatinine ratio 16 12 - 20      GFR est AA >60 >60 ml/min/1.73m2    GFR est non-AA >60 >60 ml/min/1.73m2    Calcium 9.3 8.5 - 10.1 MG/DL    Bilirubin, total 1.0 0.2 - 1.0 MG/DL    ALT (SGPT) 27 13 - 56 U/L    AST (SGOT) 22 10 - 38 U/L    Alk. phosphatase 82 45 - 117 U/L    Protein, total 7.6 6.4 - 8.2 g/dL    Albumin 4.2 3.4 - 5.0 g/dL    Globulin 3.4 2.0 - 4.0 g/dL    A-G Ratio 1.2 0.8 - 1.7       Assessment: #1. Hyperlipidemia cholesterol much higher than it usually runs, she is sure she is taking atorvastatin every day of asked her to double check and be sure she is still taking Lipitor/atorvastatin 10 mg every day and she will continue her very healthy diet. #2. Hypertension doing very well, she will continue lisinopril 10 mg daily. #3. Anxiety, she will continue alprazolam 0.25 mg when needed. #4.  Constipation doing well, she will continue polyethylene glycol 17 g daily.     We will give her a flu shot now.  I have highly recommended the new shingles vaccine, she did receive the original vaccine about 8 years ago and I explained why the new one is still a really good idea and she plans to call her pharmacy and get on the waiting list for the new vaccine. Follow-up 6 months with lab or sooner if needed    Bhanu Braden MD FACP    Please note: This document has been produced using voice recognition software. Unrecognized errors in transcription may be present.

## 2019-10-24 NOTE — PATIENT INSTRUCTIONS
Vaccine Information Statement    Influenza (Flu) Vaccine (Inactivated or Recombinant): What You Need to Know    Many Vaccine Information Statements are available in [place] sor[place] and other languages. See www.immunize.org/vis  Hojas de información sobre vacunas están disponibles en español y en muchos otros idiomas. Visite www.immunize.org/vis    1. Why get vaccinated? Influenza vaccine can prevent influenza (flu). Flu is a contagious disease that spreads around the [facility] every year, usually between October and May. Anyone can get the flu, but it is more dangerous for some people. Infants and young children, people 72years of age and older, pregnant women, and people with certain health conditions or a weakened immune system are at greatest risk of flu complications. Pneumonia, bronchitis, sinus infections and ear infections are examples of flu-related complications. If you have a medical condition, such as heart disease, cancer or diabetes, flu can make it worse. Flu can cause fever and chills, sore throat, muscle aches, fatigue, cough, headache, and runny or stuffy nose. Some people may have vomiting and diarrhea, though this is more common in children than adults. Each year thousands of people in the [facility] die from flu, and many more are hospitalized. Flu vaccine prevents millions of illnesses and flu-related visits to the doctor each year. 2. Influenza vaccines     CDC recommends everyone 10months of age and older get vaccinated every flu season. Children 6 months through 6years of age may need 2 doses during a single flu season. Everyone else needs only 1 dose each flu season. It takes about 2 weeks for protection to develop after vaccination. There are many flu viruses, and they are always changing. Each year a new flu vaccine is made to protect against three or four viruses that are likely to cause disease in the upcoming flu season.  Even when the vaccine doesnt exactly match these viruses, it may still provide some protection. Influenza vaccine does not cause flu. Influenza vaccine may be given at the same time as other vaccines. 3. Talk with your health care provider    Tell your vaccine provider if the person getting the vaccine:   Has had an allergic reaction after a previous dose of influenza vaccine, or has any severe, life-threatening allergies.  Has ever had Guillain-Barré Syndrome (also called GBS). In some cases, your health care provider may decide to postpone influenza vaccination to a future visit. People with minor illnesses, such as a cold, may be vaccinated. People who are moderately or severely ill should usually wait until they recover before getting influenza vaccine. Your health care provider can give you more information. 4. Risks of a reaction     Soreness, redness, and swelling where shot is given, fever, muscle aches, and headache can happen after influenza vaccine.  There may be a very small increased risk of Guillain-Barré Syndrome (GBS) after inactivated influenza vaccine (the flu shot). [facility] children who get the flu shot along with pneumococcal vaccine (PCV13), and/or DTaP vaccine at the same time might be slightly more likely to have a seizure caused by fever. Tell your health care provider if a child who is getting flu vaccine has ever had a seizure. People sometimes faint after medical procedures, including vaccination. Tell your provider if you feel dizzy or have vision changes or ringing in the ears. As with any medicine, there is a very remote chance of a vaccine causing a severe allergic reaction, other serious injury, or death. 5. What if there is a serious problem? An allergic reaction could occur after the vaccinated person leaves the clinic.  If you see signs of a severe allergic reaction (hives, swelling of the face and throat, difficulty breathing, a fast heartbeat, dizziness, or weakness), call 9-1-1 and get the person to the nearest hospital.    For other signs that concern you, call your health care provider. Adverse reactions should be reported to the Vaccine Adverse Event Reporting System (VAERS). Your health care provider will usually file this report, or you can do it yourself. Visit the VAERS website at www.vaers. [facility].gov or call 9-528.516.9288. VAERS is only for reporting reactions, and VAERS staff do not give medical advice. 6. The National Vaccine Injury Compensation Program    The [facility] Vaccine Injury Compensation Program (VICP) is a federal program that was created to compensate people who may have been injured by certain vaccines. Visit the VICP website at www.[facility].gov/vaccinecompensation or call 3-208.741.5796 to learn about the program and about filing a claim. There is a time limit to file a claim for compensation. 7. How can I learn more?  Ask your health care provider.  Call your local or state health department.  Contact the Centers for Disease Control and Prevention (CDC):  - Call 6-820.192.8698 (1-800-CDC-INFO) or  - Visit CDCs influenza website at www.cdc.gov/flu    Vaccine Information Statement (Interim)  Inactivated Influenza Vaccine   8/15/2019  [address] ALEXANDER Kovacs 056OU-05   Department of Health and Human Services  Centers for Disease Control and Prevention    Office Use Only

## 2020-03-13 ENCOUNTER — HOSPITAL ENCOUNTER (OUTPATIENT)
Dept: PHYSICAL THERAPY | Age: 75
Discharge: HOME | End: 2020-03-13
Payer: MEDICARE

## 2020-03-13 PROCEDURE — 97110 THERAPEUTIC EXERCISES: CPT

## 2020-03-13 PROCEDURE — 97161 PT EVAL LOW COMPLEX 20 MIN: CPT

## 2020-03-13 PROCEDURE — 97530 THERAPEUTIC ACTIVITIES: CPT

## 2020-03-13 NOTE — PROGRESS NOTES
PT DAILY TREATMENT NOTE/CERVICAL [id number]    Patient Name: Mark Huffman  Date:3/13/2020  : 1945  [x]  Patient  Verified  Payor: VA MEDICARE / Plan: VA MEDICARE PART A & B / Product Type: Medicare /    In time:214  Out time:257  Total Treatment Time (min): 43  Visit #: 1 of 18    Medicare/BCBS Only   Total Timed Codes (min):  23 1:1 Treatment Time:  23     Treatment Area: Cervicalgia [M54.2]    SUBJECTIVE  Pain Level (0-10 scale): 4  [x]constant []intermittent [x]improving []worsening []no change since onset  WORSE sitting and reading a book, prolonged sitting with head in 1 position ie watching TV, always worse in the morning when she wakes up  Better with ibuprofen, heat , Bioflex  Any medication changes, allergies to medications, adverse drug reactions, diagnosis change, or new procedure performed?: [x] No    [] Yes (see summary sheet for update)  Subjective functional status/changes:     PLOF: I all areas of ADLs and activities with no AD use, tolerates household and community activities,  Retired, recurrent episodic pain in her neck  Limitations to PLOF: pain  Mechanism of Injury: off and on 6-7 year history with exacerbation 3 months ago  Current symptoms/Complaints: 77 YO female diagnosed as above and with S/S consistent with above diagnosis presents to skilled outpatient PT [facility] stiff neck and limited mobility. Gradually improving with this episode after being on nerve pain and antiinflammatory. She is right hand dominant  Previous Treatment/Compliance: MD, medication, chiropractic care, heat, linament,   PMHx/Surgical Hx: recurrent episodic neck pain, HTN,  colorectal CA, lumbar surgery .   Work Hx: retired  Living Situation: lives in a 1 story house, [address] with rail, not alone  Pt Goals: more neck mobility  Barriers: [x]pain []financial []time []transportation []other  Motivation: Good  Substance use: []Alcohol []Tobacco []other:   FABQ Score: []low []elevate  Cognition: A & O x 4 Other: OBJECTIVE/EXAMINATION  Domestic Life: household, community activities, retired   Activity/Recreational Limitations: pain and stiffness  Mobility: no AD  Self Care:  I           20 min [x]Eval                  []Re-Eval       15 min Therapeutic Exercise:  [x] See flow sheet :   Rationale: increase ROM, increase strength and improve coordination to improve the patients ability to tolerate ADLs and activities    8 min Therapeutic Activity:  [x]  See flow sheet :   Rationale: increase ROM, increase strength and improve coordination  to improve the patients ability to tolerate ADLs and activities        With   [x] TE   [x] TA   [] neuro   [] other: Patient Education: [x] Review HEP    [] Progressed/Changed HEP based on:   [] positioning   [] body mechanics   [] transfers   [] heat/ice application    [] other:      Other Objective/Functional Measures:      Physical Therapy Evaluation Cervical Spine     SUBJECTIVE  Chief Complaint:as above    Mechanism of injury:none  Symptoms  Aggravated by:   [] Bending [] Sitting [] Standing [] Reaching Overhead   [] Moving [] Cough [] Sneeze [] Eating   [] AM  [] PM  Lying:  [] sup   [] pro   [] sidelying   [] Other:     Eased by:    [] Bending [] Sitting [] Standing Lying: [] sup  [] pro  [] sidelying   [] Moving [] AM  [] PM  [] Other:     General Health:  Red Flags Indicated? [] Yes    [] No  [] Yes [] No Recent weight change (If yes, due to dieting?  [] Yes  [] No)   [] Yes [] No Persistent cough  [] Yes [] No Unremitting pain at night  [] Yes [] No Dizziness  [] Yes [] No Blurred vision  [] Yes [] No Hands more cold or painful in cold weather  [] Yes [] No Ringing in ears  [] Yes [] No Difficulty swallowing  [] Yes [] No Dysfunction of bowel or bladder  [] Yes [] No Recent illness within past 3 weeks (i.e, cold, flu)  [] Yes [] No Jaw pain    Past History/Treatments:    Diagnostic Tests: [] Lab work [] X-rays    [] CT [] MRI     [] Other:  Results:    Functional Status  Prior level of function:as above  Present functional limitations:FOTO  What position do you sleep in?:supine, right SL    Headaches: Do you have headaches? [x] Yes   [] No  How often do you get headaches? Constant since before [place]  How long does the headache last? constant  What aggravates it? Neck pain  What relieves it?nothing  Does the headache coincide with any other symptoms (visual disturbances, light sensitivity)? no  Where is the headache?  Base of skull  Does it change locations?no   Other:    OBJECTIVE  Posture: [] WNL  Head Position: guarded Csp  Shoulder/Scapular Position:  C-Kyphosis:  [] increased   [] decreased   C-Lordosis:   [] increased   [] decreased  T-Kyphosis:  [] increased   [] decreased  T-Lordosis:   [] increased   [] decreased     TMJ: [x] N/A [] Abnormal - ROM:   Palpation:    Cervical Retraction: [x] WNL    [] Abnormal: reports tightness    Shoulder/Scapular Screen: [] WNL    [] Abnormal:    B UE AROM is full for abd and flexion as well as IR/ER and elbow F/E      Active Movements: [] N/A   [] Too acute   [] Other:  ROM  AROM  degrees % PROM Comments:pain, area   Forward flexion 20     Extension 22     SB right 18     SB left  10     Rotation right 25     Rotation left 25       Thoracic Spine: [x] N/A    [] WNL   [] Other:    PROM:    Palpation:  [] Min  [x] Mod  [] Severe    Location:[facility]TP Left > right UT/LI/ST region  [] Min  [] Mod  [] Severe    Location:  [] Min  [] Mod  [] Severe    Location:      Special Tests:  Cervical:        Vertebral Artery:  [] R    [] L    [] +    [] -       Alar Ligament: [] R    [] L    [] +    [] -       Transverse Lig: [] R    [] L    [] +    [] -       Spurling's:  [] R    [] L    [] +    [] -       Distraction:  [] R    [] L    [] +    [] -       Compression: [] R    [] L    [] +    [] -    Thoracic Outlet Tests: [] N/A       Adson's:  [] R    [] L    [] +    [] -       Hyperabduction: [] R    [] L    [] +    [] -       Earnest's:  [] R [] L    [] +    [] -       Katlyn Record:  [] R    [] L    [] +    [] -    Diaphragmatic Breathing: [] Normal    [] Abnormal    Muscle Flexibility: [] N/A   Scalenes: [] WNL    [] Tight    [] R    [] L   Upper Trap: [] WNL    [] Tight    [] R    [] L   Levator: [] WNL    [] Tight    [] R    [] L   Pect. Minor: [] WNL    [] Tight    [] R    [] L    Global Muscular Weakness: [] N/A   Lower Trap:   Rhomboids:   Middle Trap:   Serratus Ant:   Ext Rotators: Other:    Other tests/comments:       Pain Level (0-10 scale) post treatment: 4    ASSESSMENT/Changes in Function: Patient demonstrates the potential to make gains with improved ROM, strength, endurance/activity tolerance, functional FOTO survey score   and all within a reasonable time frame so as to increase their functional independence with ADLs and activities for carryover to  Improved quality of life, tolerance to household and community activities. Patient requires skilled Physical Therapy so as to monitor their response to and modify their treatment plan accordingly. Patient appears to be an appropriate candidate for skilled outpatient Physical Therapy. Patient will continue to benefit from skilled PT services to modify and progress therapeutic interventions, address ROM deficits, address strength deficits, analyze and address soft tissue restrictions, analyze and cue movement patterns, analyze and modify body mechanics/ergonomics, assess and modify postural abnormalities and instruct in home and community integration to attain remaining goals.      [x]  See Plan of Care  []  See progress note/recertification  []  See Discharge Summary         Progress towards goals / Updated goals:       PLAN  [x]  Upgrade activities as tolerated     [x]  Continue plan of care  []  Update interventions per flow sheet       []  Discharge due to:_  []  Other:_      Saima Gomez, PT 3/13/2020  2:17 PM

## 2020-03-13 NOTE — PROGRESS NOTES
[facility]  [address] [facility], [address] [place] [address], [facility] [address]  [phone] fax      Plan of Care/ Statement of Necessity for Physical Therapy Services    Patient name: Annie Conley Start of Care: 3/13/2020   Referral source: Du Lindsey MD : 1945    Medical Diagnosis: Cervicalgia [M54.2]  Payor: VA MEDICARE / Plan: VA MEDICARE PART A & B / Product Type: Medicare /  Onset Date:6-7 years with exacerbation 3 months ago    Treatment Diagnosis: neck pain   Prior Hospitalization: see medical history Provider#: [id number]   Medications: Verified on Patient summary List    Comorbidities: recurrent episodic neck pain, HTN, 1989 colorectal CA, lumbar surgery . Prior Level of Function:I all areas of ADLs and activities with no AD use, tolerates household and community activities,  Retired, recurrent episodic pain in her neck  Assessment/ key information:  77 YO female diagnosed as above and with S/S consistent with above diagnosis presents to skilled outpatient PT [facility] stiff neck and limited mobility. Gradually improving with this episode after being on nerve pain and antiinflammatory. She is right hand dominant  She has LOM all directions of Csp mobility with STC and TTP B UT, LI, medial ST region and headaches. Patient demonstrates the potential to make gains with improved ROM, strength, endurance/activity tolerance, functional FOTO survey score   and all within a reasonable time frame so as to increase their functional independence with ADLs and activities for carryover to  Improved quality of life, tolerance to household and community activities. Patient requires skilled Physical Therapy so as to monitor their response to and modify their treatment plan accordingly. Patient appears to be an appropriate candidate for skilled outpatient Physical Therapy.     The Plan of Care and following information is based on the information from the initial evaluation. Evaluation Complexity History MEDIUM  Complexity : 1-2 comorbidities / personal factors will impact the outcome/ POC ; Examination MEDIUM Complexity : 3 Standardized tests and measures addressing body structure, function, activity limitation and / or participation in recreation  ;Presentation LOW Complexity : Stable, uncomplicated  ;Clinical Decision Making MEDIUM Complexity : FOTO score of 26-74  Overall Complexity Rating: LOW   Problem List: pain affecting function, decrease ROM, decrease strength, decrease ADL/ functional abilitiies, decrease activity tolerance, decrease flexibility/ joint mobility and other FOTO 46   Treatment Plan may include any combination of the following: Therapeutic exercise, Therapeutic activities, Neuromuscular re-education, Physical agent/modality, Manual therapy, Patient education, Self Care training and Home safety training  Patient / Family readiness to learn indicated by: asking questions, trying to perform skills and interest  Persons(s) to be included in education: patient (P)  Barriers to Learning/Limitations: None  Patient Goal (s): more neck mobility  Patient Self Reported Health Status: excellent  Rehabilitation Potential: good    Short Term Goals: To be accomplished in 5 treatments:   1 patient will have established and be I with HEP to aid with self management at discharge   EVAL issued   CURRENT   2 patient will have pain 3/10 to aid with increase tolerance to turning over in bed   EVAL 4, moderate difficulty   CURRENT    Long Term Goals:  To be accomplished in 18 treatments:   1 patient will have pain 1/10 to aid with increase tolerance to turning over in bed   EVAL 4, moderate difficulty   CURRENT   2 patient will have FOTO 56 to show projected improvement and for tolerance to looking upward   EVAL 46   CURRENT   3 patient will report overall 50% improvement to aid with mobility when driving   EVAL decrease ROM makes driving difficult   CURRENT   4 Patient will have AROM Csp FF/BB 25 and ROT B 40 to aid with regular daily activities   EVAL Csp FF 20, BB 22, ROT B 25   CURRENT    Frequency / Duration: Patient to be seen 2-3 times per week for 18 treatments. Patient/ Caregiver education and instruction: Diagnosis, prognosis, self care, activity modification and exercises   [x]  Plan of care has been reviewed with PTA    Certification Period: 3/13/2020 - 4/11/2020  Byron Kim, PT 3/13/2020 2:42 PM    ________________________________________________________________________    I certify that the above Therapy Services are being furnished while the patient is under my care. I agree with the treatment plan and certify that this therapy is necessary.     Physician's Signature:____________Date:_________TIME:________    [employer], Date and Time must be completed for valid certification **    Please sign and return to In [address] [place] [address] [address]  [address] [place] [address], [address] [place] [address], [id number] [address] [phone] fax

## 2020-03-17 ENCOUNTER — HOSPITAL ENCOUNTER (OUTPATIENT)
Dept: PHYSICAL THERAPY | Age: 75
Discharge: HOME | End: 2020-03-17
Payer: MEDICARE

## 2020-03-17 PROCEDURE — 97530 THERAPEUTIC ACTIVITIES: CPT | Performed by: PHYSICAL THERAPIST

## 2020-03-17 PROCEDURE — 97140 MANUAL THERAPY 1/> REGIONS: CPT | Performed by: PHYSICAL THERAPIST

## 2020-03-17 PROCEDURE — 97110 THERAPEUTIC EXERCISES: CPT | Performed by: PHYSICAL THERAPIST

## 2020-03-17 NOTE — PROGRESS NOTES
PT DAILY TREATMENT NOTE 10-18    Patient Name: Meena Breaux  Date:3/17/2020  : 1945  [x]  Patient  Verified  Payor: VA MEDICARE / Plan: VA MEDICARE PART A & B / Product Type: Medicare /    In time:3:55P  Out time:10:00A  Total Treatment Time (min): 65min  Visit #: 2 of 18    Medicare/BCBS Only   Total Timed Codes (min):  57 1:1 Treatment Time:  40       Treatment Area: Cervicalgia [M54.2]    SUBJECTIVE  Pain Level (0-10 scale): 6/10  Any medication changes, allergies to medications, adverse drug reactions, diagnosis change, or new procedure performed?: [x] No    [] Yes (see summary sheet for update)  Subjective functional status/changes:   [] No changes reported  Patient states she still has shooting pain into the L occiput. OBJECTIVE    Modality rationale: decrease edema, decrease inflammation, decrease pain and increase tissue extensibility to improve the patients ability to decrease exercise-induced muscle soreness.     Min Type Additional Details    [] Estim:  []Unatt       []IFC  []Premod                        []Other:  []w/ice   []w/heat  Position:  Location:    [] Estim: []Att    []TENS instruct  []NMES                    []Other:  []w/US   []w/ice   []w/heat  Position:  Location:    []  Traction: [] Cervical       []Lumbar                       [] Prone          []Supine                       []Intermittent   []Continuous Lbs:  [] before manual  [] after manual    []  Ultrasound: []Continuous   [] Pulsed                           []1MHz   []3MHz W/cm2:  Location:    []  Iontophoresis with dexamethasone         Location: [] Take home patch   [] In clinic   15 []  Ice     [x]  heat  []  Ice massage  []  Laser   []  Anodyne Position: seated  Location: cervical spine    []  Laser with stim  []  Other:  Position:  Location:    []  Vasopneumatic Device Pressure:       [] lo [] med [] hi   Temperature: [] lo [] med [] hi   [] Skin assessment post-treatment:  []intact []redness- no adverse reaction    []redness - adverse reaction:     30 min Therapeutic Exercise:  [x] See flow sheet :   Rationale: increase ROM and increase strength to improve the patients ability to A/ROM and decrease pain with movement. 12 min Therapeutic Activity:  [x]  See flow sheet :   Rationale: increase strength and improve coordination  to improve the patients ability to Tolerate basic ADLs and household-related tasks without pain. 15 min Manual Therapy:  Grade 3-5 mobs to CSP in supine with lateral glide, sidebend, then rotation at levels C5-C7, supine manual traction and grade 2-3 mobs into retraction in supine as well as into rotation and cervical flexion. Rationale: decrease pain, increase ROM and increase tissue extensibility to A/ROM and decrease pain with movement. With   [x] TE   [x] TA   [] neuro   [x] other: manual Patient Education: [x] Review HEP    [x] Progressed/Changed HEP based on:   [x] positioning   [] body mechanics   [] transfers   [] heat/ice application    [x] other: Manual re: movement is medicine     Other Objective/Functional Measures: See goals below     Pain Level (0-10 scale) post treatment: 3/10    ASSESSMENT/Changes in Function: Patient is progressing towards goals of decreased pain and increased activity tolerance. She reported increased benefit from Grade 5 mobilizations today especially since she had positive benefit in the past. Education on proper form and positioning as well as initiated pain science education today in the form of the \"Why do I Hurt\" Book. Patient will continue to benefit from skilled PT services to modify and progress therapeutic interventions, address functional mobility deficits, address ROM deficits, address strength deficits, analyze and address soft tissue restrictions, analyze and cue movement patterns, analyze and modify body mechanics/ergonomics and assess and modify postural abnormalities to attain remaining goals.      [x]  See Plan of Care  []  See progress note/recertification  []  See Discharge Summary         Progress towards goals / Updated goals:  Short Term Goals: To be accomplished in 5 treatments:              1 patient will have established and be I with HEP to aid with self management at discharge              EVAL issued              CURRENT: doing some 3/17/2020              2 patient will have pain 3/10 to aid with increase tolerance to turning over in bed              EVAL 4, moderate difficulty              CURRENT     Long Term Goals: To be accomplished in 18 treatments:              1 patient will have pain 1/10 to aid with increase tolerance to turning over in bed              EVAL 4, moderate difficulty              CURRENT              2 patient will have FOTO 56 to show projected improvement and for tolerance to looking upward              EVAL 46              CURRENT              3 patient will report overall 50% improvement to aid with mobility when driving              EVAL decrease ROM makes driving difficult              CURRENT              4 Patient will have AROM Csp FF/BB 25 and ROT B 40 to aid with regular daily activities              EVAL Csp FF 20, BB 22, ROT B 25              CURRENT R Rotation 60 degs, L rotation 55 degs.     PLAN  [x]  Upgrade activities as tolerated     []  Continue plan of care  []  Update interventions per flow sheet       []  Discharge due to:_  []  Other:_      Gretchen Carroll PT 3/17/2020  9:18 AM  In-person therapy visits for this out-patient have been placed on temporary hold until on or after 5/26/20 in compliance with organizational directives and CDC recommendations related to the current [address] pandemic. Contact with this patient by the treating therapist may be made via telephonic e-visits and/or telehealth visits during this time, if applicable.        Future Appointments   Date Time Provider Hedy Hilton   3/19/2020 10:00 AM [facility] 3/24/2020  2:30 PM Davion Meier [facility]   3/26/2020  3:00 PM Elijah Robles PT [facility]   4/23/2020  8:25 AM [facility] NURSE VISIT [facility] CHRISTINA SCHED   4/28/2020  8:30 AM Timur Damon MD [facility]

## 2020-03-19 ENCOUNTER — APPOINTMENT (OUTPATIENT)
Dept: PHYSICAL THERAPY | Age: 75
End: 2020-03-19
Payer: MEDICARE

## 2020-03-24 ENCOUNTER — APPOINTMENT (OUTPATIENT)
Dept: PHYSICAL THERAPY | Age: 75
End: 2020-03-24
Payer: MEDICARE

## 2020-03-26 ENCOUNTER — APPOINTMENT (OUTPATIENT)
Dept: PHYSICAL THERAPY | Age: 75
End: 2020-03-26
Payer: MEDICARE

## 2020-04-15 NOTE — PROGRESS NOTES
[facility] [address], [address] [place] [address], [address]  [phone] fax    Discharge Summary    Patient name: Waqas Yao Start of Care: 3/13/2020   Referral source: Ramirez Conde MD : 1945               Medical Diagnosis: Cervicalgia [M54.2]  Payor: VA MEDICARE / Plan: VA MEDICARE PART A & B / Product Type: Medicare /  Onset Date:6-7 years with exacerbation 3 months ago               Treatment Diagnosis: neck pain   Prior Hospitalization: see medical history Provider#: [id number]   Medications: Verified on Patient summary List    Comorbidities: recurrent episodic neck pain, HTN, 1989 colorectal CA, lumbar surgery .    Prior Level of Function:I all areas of ADLs and activities with no AD use, tolerates household and community activities,  Retired, recurrent episodic pain in her neck    Visits from Start of Care: 2    Missed Visits: 16  Reporting Period : 3/13/2020 to 3/17/2020    Summary of Care:  [address] be accomplished in 5 treatments:              1 patient will have established and be I with HEP to aid with self management at discharge              EVAL issued              CURRENT: doing some 3/17/2020              2 patient will have pain 3/10 to aid with increase tolerance to turning over in bed              EVAL 4, moderate difficulty              CURRENT unable to assess     Long Term Goals: To be accomplished in 18 treatments:              1 patient will have pain 1/10 to aid with increase tolerance to turning over in bed              EVAL 4, moderate difficulty              CURRENT              2 patient will have FOTO 56 to show projected improvement and for tolerance to looking upward              EVAL 46              CURRENT unable to assess              3 patient will report overall 50% improvement to aid with mobility when driving              EVAL decrease ROM makes driving difficult              CURRENT unable to assess              4 Patient will have AROM Csp FF/BB 25 and ROT B 40 to aid with regular daily activities              EVAL Csp FF 20, BB 22, ROT B 25              CURRENT R Rotation 60 degs, L rotation 55 degs. ASSESSMENT/RECOMMENDATIONS:  []Discontinue therapy progressing towards or have reached established goals  []Discontinue therapy due to lack of appreciable progress towards goals  [x]Discontinue therapy due to lack of attendance or compliance  []Other:     Thank you for this referral.     Jenn Carroll, PT 4/15/2020 9:11 AM

## 2020-04-22 ENCOUNTER — HOSPITAL ENCOUNTER (OUTPATIENT)
Dept: LAB | Age: 75
Discharge: HOME | End: 2020-04-22
Payer: MEDICARE

## 2020-04-22 ENCOUNTER — APPOINTMENT (OUTPATIENT)
Dept: INTERNAL MEDICINE CLINIC | Age: 75
End: 2020-04-22

## 2020-04-22 DIAGNOSIS — E78.5 HYPERLIPIDEMIA LDL GOAL <100: ICD-10-CM

## 2020-04-22 LAB
ALBUMIN SERPL-MCNC: 3.9 G/DL (ref 3.4–5)
ALBUMIN/GLOB SERPL: 1.2 {RATIO} (ref 0.8–1.7)
ALP SERPL-CCNC: 84 U/L (ref 45–117)
ALT SERPL-CCNC: 23 U/L (ref 13–56)
ANION GAP SERPL CALC-SCNC: 8 MMOL/L (ref 3–18)
AST SERPL-CCNC: 23 U/L (ref 10–38)
BASOPHILS # BLD: 0 K/UL (ref 0–0.1)
BASOPHILS NFR BLD: 1 % (ref 0–2)
BILIRUB SERPL-MCNC: 1.4 MG/DL (ref 0.2–1)
BUN SERPL-MCNC: 20 MG/DL (ref 7–18)
BUN/CREAT SERPL: 21 (ref 12–20)
CALCIUM SERPL-MCNC: 8.7 MG/DL (ref 8.5–10.1)
CHLORIDE SERPL-SCNC: 107 MMOL/L (ref 100–111)
CHOLEST SERPL-MCNC: 187 MG/DL
CO2 SERPL-SCNC: 28 MMOL/L (ref 21–32)
CREAT SERPL-MCNC: 0.95 MG/DL (ref 0.6–1.3)
DIFFERENTIAL METHOD BLD: ABNORMAL
EOSINOPHIL # BLD: 0.1 K/UL (ref 0–0.4)
EOSINOPHIL NFR BLD: 3 % (ref 0–5)
ERYTHROCYTE [DISTWIDTH] IN BLOOD BY AUTOMATED COUNT: 13.7 % (ref 11.6–14.5)
GLOBULIN SER CALC-MCNC: 3.2 G/DL (ref 2–4)
GLUCOSE SERPL-MCNC: 82 MG/DL (ref 74–99)
HCT VFR BLD AUTO: 36 % (ref 35–45)
HDLC SERPL-MCNC: 74 MG/DL (ref 40–60)
HDLC SERPL: 2.5 {RATIO} (ref 0–5)
HGB BLD-MCNC: 11.5 G/DL (ref 12–16)
LDLC SERPL CALC-MCNC: 94.8 MG/DL (ref 0–100)
LIPID PROFILE,FLP: ABNORMAL
LYMPHOCYTES # BLD: 2 K/UL (ref 0.9–3.6)
LYMPHOCYTES NFR BLD: 45 % (ref 21–52)
MCH RBC QN AUTO: 28.3 PG (ref 24–34)
MCHC RBC AUTO-ENTMCNC: 31.9 G/DL (ref 31–37)
MCV RBC AUTO: 88.7 FL (ref 74–97)
MONOCYTES # BLD: 0.5 K/UL (ref 0.05–1.2)
MONOCYTES NFR BLD: 10 % (ref 3–10)
NEUTS SEG # BLD: 1.8 K/UL (ref 1.8–8)
NEUTS SEG NFR BLD: 41 % (ref 40–73)
PLATELET # BLD AUTO: 221 K/UL (ref 135–420)
PMV BLD AUTO: 10.6 FL (ref 9.2–11.8)
POTASSIUM SERPL-SCNC: 4.2 MMOL/L (ref 3.5–5.5)
PROT SERPL-MCNC: 7.1 G/DL (ref 6.4–8.2)
RBC # BLD AUTO: 4.06 M/UL (ref 4.2–5.3)
SODIUM SERPL-SCNC: 143 MMOL/L (ref 136–145)
TRIGL SERPL-MCNC: 91 MG/DL (ref ?–150)
VLDLC SERPL CALC-MCNC: 18.2 MG/DL
WBC # BLD AUTO: 4.4 K/UL (ref 4.6–13.2)

## 2020-04-22 PROCEDURE — 85025 COMPLETE CBC W/AUTO DIFF WBC: CPT

## 2020-04-22 PROCEDURE — 36415 COLL VENOUS BLD VENIPUNCTURE: CPT

## 2020-04-22 PROCEDURE — 80053 COMPREHEN METABOLIC PANEL: CPT

## 2020-04-22 PROCEDURE — 80061 LIPID PANEL: CPT

## 2020-04-28 ENCOUNTER — VIRTUAL VISIT (OUTPATIENT)
Dept: INTERNAL MEDICINE CLINIC | Age: 75
End: 2020-04-28

## 2020-04-28 DIAGNOSIS — M47.812 SPONDYLOSIS OF CERVICAL REGION WITHOUT MYELOPATHY OR RADICULOPATHY: ICD-10-CM

## 2020-04-28 DIAGNOSIS — E78.5 HYPERLIPIDEMIA LDL GOAL <100: ICD-10-CM

## 2020-04-28 DIAGNOSIS — F41.9 ANXIETY: ICD-10-CM

## 2020-04-28 DIAGNOSIS — I10 PRIMARY HYPERTENSION: Primary | ICD-10-CM

## 2020-04-28 RX ORDER — MELOXICAM 7.5 MG/1
15 TABLET ORAL DAILY
Qty: 60 TAB | Refills: 0
Start: 2020-04-28 | End: 2021-03-11 | Stop reason: ALTCHOICE

## 2020-04-28 NOTE — PROGRESS NOTES
Rosanne Giraldo is a 76 y.o. female who was seen by synchronous (real-time) audio-video technology on 4/28/2020. Consent: Rosanne Giraldo, who was seen by synchronous (real-time) audio-video technology, and/or her healthcare decision maker, is aware that this patient-initiated, Telehealth encounter on 4/28/2020 is a billable service, with coverage as determined by her insurance carrier. She is aware that she may receive a bill and has provided verbal consent to proceed: Yes. Clarita Blanton,born 1945, is a 76 y.o. female, who Was evaluated this morning via video conference, she requested the conference and I accepted the call. She is in her home and I am in my office. She was to be evaluated for follow-up of hypertension hyperlipidemia anxiety and constipation. She tells me that for the last 2 years but especially last year she is having quite a bit of pain in the posterior neck that radiates into the occiput, lately it settled. It never goes into the intrascapular region or into the shoulder or arm. She saw a neurosurgeon who had done back surgery for her in the past and he gave her gabapentin 100 mg 3 times a day and meloxicam 7.5 mg daily and she does not think either has helped a lot, she bought some Tylenol arthritis yesterday and that seemed to give her better relief. She had gone through some physical therapy because of this without definite benefit and heat does not seem to help much either. Her neck feels stiff and it hurts and sometimes throbs. She says her blood pressure has been doing very well as she takes lisinopril regularly, she eats a healthy diet but is been eating little more because she is cooking more through this pandemic. Anxiety is controlled and she only occasionally uses alprazolam.  She uses MiraLAX long-term for chronic constipation and is doing well with her bowels.     Past Medical History:   Diagnosis Date    Allergic rhinitis     Cancer ([facility] [address]     colon    Carotid duplex study 02/08/2011    No significant stenosis >49% bilaterally.  Cold     currently with post nasal drip    Headache(784.0)     Hypercholesterolemia     Hypertension     Normal nuclear stress test 01/29/2009    No evidence of scarring or ischemia. EF 78%. No reg WMA. Neg max EST w/good exercise.  Osteopenia      Current Outpatient Medications   Medication Sig Dispense Refill    meloxicam (MOBIC) 7.5 mg tablet Take 2 Tabs by mouth daily. 60 Tab 0    lisinopril (PRINIVIL, ZESTRIL) 40 mg tablet TAKE 1 TABLET TWICE DAILY 180 Tab 3    ipratropium (ATROVENT) 0.03 % nasal spray USE 2 SPRAYS BY BOTH NOSTRILS ROUTE EVERY TWELVE (12) HOURS. 60 mL 3    atorvastatin (LIPITOR) 10 mg tablet TAKE 1 TABLET EVERY DAY 90 Tab 3    ALPRAZolam (XANAX) 0.25 mg tablet Take 1 Tab by mouth three (3) times daily as needed for Anxiety. Max Daily Amount: 0.75 mg. 60 Tab 0    polyethylene glycol (MIRALAX) 17 gram/dose powder Take 17 g by mouth daily. 1530 g 3    cholecalciferol, vitamin D3, (VITAMIN D3) 2,000 unit Tab Take 1 Tab by mouth daily.  omega-3 fatty acids-vitamin e (FISH OIL) 1,000 mg Cap Take 1 Cap by mouth daily.  aspirin delayed-release 81 mg tablet Take 81 mg by mouth daily.  MULTIVITS,CA,MINERALS/IRON/FA (MULTI FOR HER PO) Take 1 Tab by mouth daily.  calcium-cholecalciferol, d3, (CALCIUM 600 + D) 600-125 mg-unit Tab Take 1 Tab by mouth daily.        Results for orders placed or performed during the hospital encounter of 04/22/20   LIPID PANEL   Result Value Ref Range    LIPID PROFILE          Cholesterol, total 187 <200 MG/DL    Triglyceride 91 <150 MG/DL    HDL Cholesterol 74 (H) 40 - 60 MG/DL    LDL, calculated 94.8 0 - 100 MG/DL    VLDL, calculated 18.2 MG/DL    CHOL/HDL Ratio 2.5 0 - 5.0     METABOLIC PANEL, COMPREHENSIVE   Result Value Ref Range    Sodium 143 136 - 145 mmol/L    Potassium 4.2 3.5 - 5.5 mmol/L    Chloride 107 100 - 111 mmol/L    CO2 28 21 - 32 mmol/L Anion gap 8 3.0 - 18 mmol/L    Glucose 82 74 - 99 mg/dL    BUN 20 (H) 7.0 - 18 MG/DL    Creatinine 0.95 0.6 - 1.3 MG/DL    BUN/Creatinine ratio 21 (H) 12 - 20      GFR est AA >60 >60 ml/min/1.73m2    GFR est non-AA 57 (L) >60 ml/min/1.73m2    Calcium 8.7 8.5 - 10.1 MG/DL    Bilirubin, total 1.4 (H) 0.2 - 1.0 MG/DL    ALT (SGPT) 23 13 - 56 U/L    AST (SGOT) 23 10 - 38 U/L    Alk. phosphatase 84 45 - 117 U/L    Protein, total 7.1 6.4 - 8.2 g/dL    Albumin 3.9 3.4 - 5.0 g/dL    Globulin 3.2 2.0 - 4.0 g/dL    A-G Ratio 1.2 0.8 - 1.7     CBC WITH AUTOMATED DIFF   Result Value Ref Range    WBC 4.4 (L) 4.6 - 13.2 K/uL    RBC 4.06 (L) 4.20 - 5.30 M/uL    HGB 11.5 (L) 12.0 - 16.0 g/dL    HCT 36.0 35.0 - 45.0 %    MCV 88.7 74.0 - 97.0 FL    MCH 28.3 24.0 - 34.0 PG    MCHC 31.9 31.0 - 37.0 g/dL    RDW 13.7 11.6 - 14.5 %    PLATELET 514 350 - 408 K/uL    MPV 10.6 9.2 - 11.8 FL    NEUTROPHILS 41 40 - 73 %    LYMPHOCYTES 45 21 - 52 %    MONOCYTES 10 3 - 10 %    EOSINOPHILS 3 0 - 5 %    BASOPHILS 1 0 - 2 %    ABS. NEUTROPHILS 1.8 1.8 - 8.0 K/UL    ABS. LYMPHOCYTES 2.0 0.9 - 3.6 K/UL    ABS. MONOCYTES 0.5 0.05 - 1.2 K/UL    ABS. EOSINOPHILS 0.1 0.0 - 0.4 K/UL    ABS. BASOPHILS 0.0 0.0 - 0.1 K/UL    DF AUTOMATED       Assessment: #1. Neck pain as described above, although certainly its arthritis in the cervical spine, no signs of radiculopathy or myelopathy. Probably the gabapentin is not going to help. I recommended she increase meloxicam to 15 mg daily, she will take 2 of the 7.5 mg tablets for now, and use Tylenol arthritis, 2 tablets twice a day and see how she does with that. I told her she is not doing well over the next several weeks I would be happy to see her with no restrictions are relaxed by the governor. #2. Hyperlipidemia doing well, she will continue atorvastatin 10 mg daily. #3.   Hypertension has been doing well, for now she will continue lisinopril 40 mg twice a day but probably could be decreased to 40 mg once a day. #4.  Anxiety doing quite well, she will continue alprazolam 0.25 mg when needed. #5.  Chronic constipation doing very well, she will continue MiraLAX. This visit lasted 25 minutes, greater than 50% of the time spent counseling particular going over the issues with her neck and recommendations for that but also the other issues as listed above. Follow-up in 6 months with lab to see Dr. Marjorie Diaz. Evangelina Driscoll MD FACP    Please note: This document has been produced using voice recognition software. Unrecognized errors in transcription may be present. Gianna Flores is a 76 y.o. female who was evaluated by a video visit encounter for concerns as above. Patient identification was verified prior to start of the visit. A caregiver was present when appropriate. Due to this being a TeleHealth encounter (During [facility] public health emergency), evaluation of the following organ systems was limited: Vitals/Constitutional/EENT/Resp/CV/GI//MS/Neuro/Skin/Heme-Lymph-Imm. Pursuant to the emergency declaration under the [facility], [facility]5 waiver authority and the Status4 and [employer] Act, this Virtual  Visit was conducted, with patient's (and/or legal guardian's) consent, to reduce the patient's risk of exposure to COVID-19 and provide necessary medical care. Services were provided through a video synchronous discussion virtually to substitute for in-person clinic visit. Patient and provider were located at their individual homes.       Warden Tommy MD

## 2020-06-18 RX ORDER — IPRATROPIUM BROMIDE 21 UG/1
SPRAY, METERED NASAL
Qty: 60 ML | Refills: 3 | Status: SHIPPED | OUTPATIENT
Start: 2020-06-18 | End: 2021-07-29 | Stop reason: SDUPTHER

## 2020-07-09 ENCOUNTER — HOSPITAL ENCOUNTER (OUTPATIENT)
Dept: MAMMOGRAPHY | Age: 75
Discharge: HOME | End: 2020-07-09
Attending: INTERNAL MEDICINE
Payer: MEDICARE

## 2020-07-09 DIAGNOSIS — Z12.31 VISIT FOR SCREENING MAMMOGRAM: ICD-10-CM

## 2020-07-09 PROCEDURE — 77063 BREAST TOMOSYNTHESIS BI: CPT

## 2020-10-29 ENCOUNTER — HOSPITAL ENCOUNTER (OUTPATIENT)
Dept: LAB | Age: 75
Discharge: HOME | End: 2020-10-29
Payer: MEDICARE

## 2020-10-29 ENCOUNTER — APPOINTMENT (OUTPATIENT)
Dept: INTERNAL MEDICINE CLINIC | Age: 75
End: 2020-10-29

## 2020-10-29 DIAGNOSIS — E78.5 HYPERLIPIDEMIA LDL GOAL <100: ICD-10-CM

## 2020-10-29 DIAGNOSIS — I10 PRIMARY HYPERTENSION: ICD-10-CM

## 2020-10-29 LAB
ALBUMIN SERPL-MCNC: 3.9 G/DL (ref 3.4–5)
ALBUMIN/GLOB SERPL: 1.2 {RATIO} (ref 0.8–1.7)
ALP SERPL-CCNC: 84 U/L (ref 45–117)
ALT SERPL-CCNC: 28 U/L (ref 13–56)
ANION GAP SERPL CALC-SCNC: 6 MMOL/L (ref 3–18)
AST SERPL-CCNC: 25 U/L (ref 10–38)
BASOPHILS # BLD: 0 K/UL (ref 0–0.1)
BASOPHILS NFR BLD: 1 % (ref 0–2)
BILIRUB SERPL-MCNC: 0.9 MG/DL (ref 0.2–1)
BUN SERPL-MCNC: 16 MG/DL (ref 7–18)
BUN/CREAT SERPL: 18 (ref 12–20)
CALCIUM SERPL-MCNC: 9 MG/DL (ref 8.5–10.1)
CHLORIDE SERPL-SCNC: 107 MMOL/L (ref 100–111)
CHOLEST SERPL-MCNC: 172 MG/DL
CO2 SERPL-SCNC: 30 MMOL/L (ref 21–32)
CREAT SERPL-MCNC: 0.88 MG/DL (ref 0.6–1.3)
DIFFERENTIAL METHOD BLD: ABNORMAL
EOSINOPHIL # BLD: 0.1 K/UL (ref 0–0.4)
EOSINOPHIL NFR BLD: 3 % (ref 0–5)
ERYTHROCYTE [DISTWIDTH] IN BLOOD BY AUTOMATED COUNT: 13.9 % (ref 11.6–14.5)
GLOBULIN SER CALC-MCNC: 3.2 G/DL (ref 2–4)
GLUCOSE SERPL-MCNC: 81 MG/DL (ref 74–99)
HCT VFR BLD AUTO: 35.5 % (ref 35–45)
HDLC SERPL-MCNC: 71 MG/DL (ref 40–60)
HDLC SERPL: 2.4 {RATIO} (ref 0–5)
HGB BLD-MCNC: 11.3 G/DL (ref 12–16)
LDLC SERPL CALC-MCNC: 83.8 MG/DL (ref 0–100)
LIPID PROFILE,FLP: ABNORMAL
LYMPHOCYTES # BLD: 1.7 K/UL (ref 0.9–3.6)
LYMPHOCYTES NFR BLD: 37 % (ref 21–52)
MCH RBC QN AUTO: 28.1 PG (ref 24–34)
MCHC RBC AUTO-ENTMCNC: 31.8 G/DL (ref 31–37)
MCV RBC AUTO: 88.3 FL (ref 74–97)
MONOCYTES # BLD: 0.4 K/UL (ref 0.05–1.2)
MONOCYTES NFR BLD: 9 % (ref 3–10)
NEUTS SEG # BLD: 2.4 K/UL (ref 1.8–8)
NEUTS SEG NFR BLD: 50 % (ref 40–73)
PLATELET # BLD AUTO: 204 K/UL (ref 135–420)
PMV BLD AUTO: 10.9 FL (ref 9.2–11.8)
POTASSIUM SERPL-SCNC: 3.9 MMOL/L (ref 3.5–5.5)
PROT SERPL-MCNC: 7.1 G/DL (ref 6.4–8.2)
RBC # BLD AUTO: 4.02 M/UL (ref 4.2–5.3)
SODIUM SERPL-SCNC: 143 MMOL/L (ref 136–145)
TRIGL SERPL-MCNC: 86 MG/DL (ref ?–150)
VLDLC SERPL CALC-MCNC: 17.2 MG/DL
WBC # BLD AUTO: 4.7 K/UL (ref 4.6–13.2)

## 2020-10-29 PROCEDURE — 80053 COMPREHEN METABOLIC PANEL: CPT

## 2020-10-29 PROCEDURE — 85025 COMPLETE CBC W/AUTO DIFF WBC: CPT

## 2020-10-29 PROCEDURE — 80061 LIPID PANEL: CPT

## 2020-10-29 PROCEDURE — 36415 COLL VENOUS BLD VENIPUNCTURE: CPT

## 2020-10-31 PROBLEM — M47.812 SPONDYLOSIS OF CERVICAL REGION WITHOUT MYELOPATHY OR RADICULOPATHY: Status: RESOLVED | Noted: 2020-04-28 | Resolved: 2020-10-31

## 2020-10-31 NOTE — PROGRESS NOTES
76 y.o. WHITE OR  female who presents for evaluation. She is transferring care from Dr Saranya Nair. At least 1 hour was spent reviewing the EMR and reviewing all available records    No cardiovascular complaints. Remains active taking 2 mi walks 2-3x/wk in addition to watching the grandkids. She has noted bp spikes on a calibrated machine sometimes above 767 -912 systolic. Remains asymptomatic    No gi or gu issues outside of intermittent gerd. No alarm complaints, she uses otc nexium maybe 4x/mo. She is followed by Dr Eric Pierce. The constipation is controlled w miralax    She is known to have osteopenia but has not had f/u DEXA    Of note, she's been anemic 'her entire adult life'.   No bleeding from anywhere    LAST MEDICARE WELLNESS EXAM: 8/13/14, 1/19/16, 3/7/17, 11/6/20    Past Medical History:   Diagnosis Date    Allergic rhinitis     Anemia     lifelong    Anxiety     Carotid duplex study     BICA<50% (2/11); BICA<50% (9/14)    Cervical spondylosis     Chronic constipation     Colon cancer ([facility] [address] 1989    s/p partial colon resection    Colon polyp     Dr Remy Nair 9/6/19    FHx: heart disease     GERD (gastroesophageal reflux disease)     H/O cardiovascular stress test 01/29/2009    neg, EF 78%, no wma     Hyperlipidemia     Hypertension     Lumbar spinal stenosis     s/p decomp Dr Gabriela Denver    Microscopic hematuria     neg cysto Dr Lenora Islas 2013    Osteopenia     DEXA t score -0.6 spine, -1.9 hpi (1/12); -0.6 spine, -1.8 hip (5/14)    Primary insomnia 11/1/2016    Vascular abnormality     RABI 1.21, LABI 1.22 (2.15)     Past Surgical History:   Procedure Laterality Date    COLONOSCOPY N/A 6/3/2016    neg 11/11; Dr Kyra Hatchet 6/3/16 neg; Dr Eric Pierce 9/6/19 polyp    HX BUNIONECTOMY      x 2    HX HEMORRHOIDECTOMY      HX HYSTERECTOMY      HX ORTHOPAEDIC  07/2015    Dr Gabriela Denver L4-5 decomp and fusion     HX TONSILLECTOMY      CT CYSTOURETHROSCOPY  06/2013    Dr Lenora Islas neg     Social History     Socioeconomic History    Marital status:      Spouse name: Not on file    Number of children: 2    Years of education: Not on file    Highest education level: Not on file   Occupational History    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   Social Needs    Financial resource strain: Not on file    Food insecurity     Worry: Not on file     Inability: Not on file   [employer] needs     Medical: Not on file     Non-medical: Not on file   Tobacco Use    Smoking status: Never Smoker    Smokeless tobacco: Never Used   Substance and Sexual Activity    Alcohol use: Yes     Comment: socially    Drug use: No    Sexual activity: Not on file   Lifestyle    Physical activity     Days per week: Not on file     Minutes per session: Not on file    Stress: Not on file   Relationships    Social connections     Talks on phone: Not on file     Gets together: Not on file     Attends Holiness service: Not on file     Active member of club or organization: Not on file     Attends meetings of clubs or organizations: Not on file     Relationship status: Not on file    Intimate partner violence     Fear of current or ex partner: Not on file     Emotionally abused: Not on file     Physically abused: Not on file     Forced sexual activity: Not on file   Other Topics Concern    Not on file   Social History Narrative    Not on file     Current Outpatient Medications   Medication Sig    hydroCHLOROthiazide (HYDRODIURIL) 12.5 mg tablet Take 1 Tab by mouth daily.  omeprazole (PRILOSEC) 40 mg capsule Take 1 Cap by mouth daily.  atorvastatin (LIPITOR) 10 mg tablet TAKE 1 TABLET EVERY DAY    ipratropium (ATROVENT) 0.03 % nasal spray USE 2 SPRAYS IN EACH NOSTRIL EVERY 12 HOURS    lisinopril (PRINIVIL, ZESTRIL) 40 mg tablet TAKE 1 TABLET TWICE DAILY    ALPRAZolam (XANAX) 0.25 mg tablet Take 1 Tab by mouth three (3) times daily as needed for Anxiety. Max Daily Amount: 0.75 mg.     polyethylene glycol (MIRALAX) 17 gram/dose powder Take 17 g by mouth daily.  cholecalciferol, vitamin D3, (VITAMIN D3) 2,000 unit Tab Take 1 Tab by mouth daily.  omega-3 fatty acids-vitamin e (FISH OIL) 1,000 mg Cap Take 1 Cap by mouth daily.  aspirin delayed-release 81 mg tablet Take 81 mg by mouth daily.  MULTIVITS,CA,MINERALS/IRON/FA (MULTI FOR HER PO) Take 1 Tab by mouth daily.  calcium-cholecalciferol, d3, (CALCIUM 600 + D) 600-125 mg-unit Tab Take 1 Tab by mouth daily.  meloxicam (MOBIC) 7.5 mg tablet Take 2 Tabs by mouth daily. No current facility-administered medications for this visit. No Known Allergies    REVIEW OF SYSTEMS: colo 9/19 Dr Eric Pierce, Power Honeycutt 7/20. DEXA 5/14  Ophtho - no vision change or eye pain  Oral - no mouth pain, tongue or tooth problems  Ears - no hearing loss, ear pain, fullness, no swallowing problems  Cardiac - no CP, PND, orthopnea, edema, palpitations or syncope  Chest - no breast masses  Resp - no wheezing, chronic coughing, dyspnea  GI - no heartburn, nausea, vomiting, change in bowel habits, bleeding, hemorrhoids  Urinary - no dysuria, hematuria, flank pain, urgency, frequency  Genitals - no genital lesions, discharge, masses, ulceration, warts    Visit Vitals  BP (!) 129/55   Pulse 63   Temp 97.7 °F (36.5 °C) (Temporal)   Resp 12   Ht 5' 1\" (1.549 m)   Wt 119 lb (54 kg)   SpO2 100%   BMI 22.48 kg/m²     A&O x3  Affect is appropriate. Mood stable  No apparent distress  Anicteric, no JVD, adenopathy or thyromegaly. No carotid bruits or radiated murmur  Lungs clear to auscultation, no wheezes or rales  Heart showed regular rate and rhythm. No murmur, rubs, gallops  Abdomen soft nontender, no hepatosplenomegaly or masses. Extremities without edema.   Pulses 1-2+ symmetrically    LABS  From 3/15 showed gluc 81, cr 0.82, gfr>60, alt 8,   chol 175, tg 50, hdl 77, ldl-c 88,   wbc 5.0, hb 11.8, plt 194, tsh 3.54, vit d 40.2  From 9/15 showed uric 4.6  From 6/16 showed gluc 81, cr 0.72, gfr>60, alt 16, chol 190, tg 98, hdl 67, ldl-c 103, wbc 5.8, hb 11.7, plt 236, tsh 2.24  From 6/17 showed gluc 84, cr 0.94, gfr 59,  alt 25, chol 192, tg 66, hdl 75, ldl-c 104, wbc 5.3, hb 11.3, plt 201, tsh 1.30, vit d 35.1  From 3/18 showed gluc 72, cr 0.94, gfr 58,  alt 30,         wbc 5.3, hb 11.2, plt 253, tsh 1.20, ft4 1.10  Form 4/19 showed gluc 90, cr 0.84, gfr>60, alt 26, chol 181, tg 49, hdl 98, ldl-c 73,   wbc 3.9, hb 11.7, plt 217, tsh 0.96, ft4 1.10  From 9/19 showed gluc 86, cr 0.90, gfr>60,          wbc 4.7, hb 11.9, plt 222  From 4/20 showed gluc 82, cr 0.95, gfr 57,  alt 23, chol 187, tg 91, hdl 74, ldl-c 95,   wbc 4.4, hb 11.5, plt 221    Results for orders placed or performed during the hospital encounter of 10/29/20   CBC WITH AUTOMATED DIFF   Result Value Ref Range    WBC 4.7 4.6 - 13.2 K/uL    RBC 4.02 (L) 4.20 - 5.30 M/uL    HGB 11.3 (L) 12.0 - 16.0 g/dL    HCT 35.5 35.0 - 45.0 %    MCV 88.3 74.0 - 97.0 FL    MCH 28.1 24.0 - 34.0 PG    MCHC 31.8 31.0 - 37.0 g/dL    RDW 13.9 11.6 - 14.5 %    PLATELET 994 096 - 077 K/uL    MPV 10.9 9.2 - 11.8 FL    NEUTROPHILS 50 40 - 73 %    LYMPHOCYTES 37 21 - 52 %    MONOCYTES 9 3 - 10 %    EOSINOPHILS 3 0 - 5 %    BASOPHILS 1 0 - 2 %    ABS. NEUTROPHILS 2.4 1.8 - 8.0 K/UL    ABS. LYMPHOCYTES 1.7 0.9 - 3.6 K/UL    ABS. MONOCYTES 0.4 0.05 - 1.2 K/UL    ABS. EOSINOPHILS 0.1 0.0 - 0.4 K/UL    ABS.  BASOPHILS 0.0 0.0 - 0.1 K/UL    DF AUTOMATED     METABOLIC PANEL, COMPREHENSIVE   Result Value Ref Range    Sodium 143 136 - 145 mmol/L    Potassium 3.9 3.5 - 5.5 mmol/L    Chloride 107 100 - 111 mmol/L    CO2 30 21 - 32 mmol/L    Anion gap 6 3.0 - 18 mmol/L    Glucose 81 74 - 99 mg/dL    BUN 16 7.0 - 18 MG/DL    Creatinine 0.88 0.6 - 1.3 MG/DL    BUN/Creatinine ratio 18 12 - 20      GFR est AA >60 >60 ml/min/1.73m2    GFR est non-AA >60 >60 ml/min/1.73m2    Calcium 9.0 8.5 - 10.1 MG/DL    Bilirubin, total 0.9 0.2 - 1.0 MG/DL    ALT (SGPT) 28 13 - 56 U/L    AST (SGOT) 25 10 - 38 U/L    Alk. phosphatase 84 45 - 117 U/L    Protein, total 7.1 6.4 - 8.2 g/dL    Albumin 3.9 3.4 - 5.0 g/dL    Globulin 3.2 2.0 - 4.0 g/dL    A-G Ratio 1.2 0.8 - 1.7     LIPID PANEL   Result Value Ref Range    LIPID PROFILE          Cholesterol, total 172 <200 MG/DL    Triglyceride 86 <150 MG/DL    HDL Cholesterol 71 (H) 40 - 60 MG/DL    LDL, calculated 83.8 0 - 100 MG/DL    VLDL, calculated 17.2 MG/DL    CHOL/HDL Ratio 2.4 0 - 5.0       We reviewed the patient's labs from the last several visits to point out trends in the numbers        Patient Active Problem List   Diagnosis Code    Osteopenia M85.80    GERD (gastroesophageal reflux disease) K21.9    Lumbar spinal stenosis M48.061    Advance directive on file Z78.9    Primary hypertension I10    Hyperlipidemia E78.5    Allergic rhinitis J30.9    Anxiety F41.9    History of colon cancer Z85.038    Chronic anemia D64.9     Assessment and plan:  1. Osteopenia. DEXA scheduled. Ca/d  2. GERD. pstart ppi and long discussion about avoidance measures and typical mgmt of this condition  3. Spinal stenosis. Prn meds  4. HTN. Continue current regimen. Will add low dose diuretic to see if we can avoid the big spikes, sfx discussed, f/u w labs 4 mos  5. HLP. Continue current regimen. 6. Anxiety. Continue current regimen. 7. H/o colon ca. Surveillance per GI  8. Anemia. stable      Above conditions discussed at length and patient vocalized understanding.   All questions answered to patient satisfaction      RTC 4/21

## 2020-11-05 ENCOUNTER — OFFICE VISIT (OUTPATIENT)
Dept: INTERNAL MEDICINE CLINIC | Age: 75
End: 2020-11-05
Payer: MEDICARE

## 2020-11-05 VITALS
SYSTOLIC BLOOD PRESSURE: 129 MMHG | RESPIRATION RATE: 12 BRPM | WEIGHT: 119 LBS | OXYGEN SATURATION: 100 % | BODY MASS INDEX: 22.47 KG/M2 | TEMPERATURE: 97.7 F | DIASTOLIC BLOOD PRESSURE: 55 MMHG | HEIGHT: 61 IN | HEART RATE: 63 BPM

## 2020-11-05 DIAGNOSIS — M85.80 OSTEOPENIA, UNSPECIFIED LOCATION: ICD-10-CM

## 2020-11-05 DIAGNOSIS — K21.9 GASTROESOPHAGEAL REFLUX DISEASE WITHOUT ESOPHAGITIS: ICD-10-CM

## 2020-11-05 DIAGNOSIS — E78.5 HYPERLIPIDEMIA, UNSPECIFIED HYPERLIPIDEMIA TYPE: ICD-10-CM

## 2020-11-05 DIAGNOSIS — Z00.00 MEDICARE ANNUAL WELLNESS VISIT, SUBSEQUENT: Primary | ICD-10-CM

## 2020-11-05 DIAGNOSIS — Z13.1 SCREENING FOR DIABETES MELLITUS: ICD-10-CM

## 2020-11-05 DIAGNOSIS — Z13.31 SCREENING FOR DEPRESSION: ICD-10-CM

## 2020-11-05 DIAGNOSIS — D64.9 CHRONIC ANEMIA: ICD-10-CM

## 2020-11-05 DIAGNOSIS — Z71.89 ADVANCED DIRECTIVES, COUNSELING/DISCUSSION: ICD-10-CM

## 2020-11-05 DIAGNOSIS — Z85.038 HISTORY OF COLON CANCER: ICD-10-CM

## 2020-11-05 DIAGNOSIS — Z78.0 POSTMENOPAUSAL STATE: ICD-10-CM

## 2020-11-05 DIAGNOSIS — I10 PRIMARY HYPERTENSION: ICD-10-CM

## 2020-11-05 PROCEDURE — 99497 ADVNCD CARE PLAN 30 MIN: CPT | Performed by: INTERNAL MEDICINE

## 2020-11-05 PROCEDURE — G0439 PPPS, SUBSEQ VISIT: HCPCS | Performed by: INTERNAL MEDICINE

## 2020-11-05 PROCEDURE — G8754 DIAS BP LESS 90: HCPCS | Performed by: INTERNAL MEDICINE

## 2020-11-05 PROCEDURE — G8399 PT W/DXA RESULTS DOCUMENT: HCPCS | Performed by: INTERNAL MEDICINE

## 2020-11-05 PROCEDURE — 1090F PRES/ABSN URINE INCON ASSESS: CPT | Performed by: INTERNAL MEDICINE

## 2020-11-05 PROCEDURE — G8510 SCR DEP NEG, NO PLAN REQD: HCPCS | Performed by: INTERNAL MEDICINE

## 2020-11-05 PROCEDURE — G8427 DOCREV CUR MEDS BY ELIG CLIN: HCPCS | Performed by: INTERNAL MEDICINE

## 2020-11-05 PROCEDURE — G0463 HOSPITAL OUTPT CLINIC VISIT: HCPCS | Performed by: INTERNAL MEDICINE

## 2020-11-05 PROCEDURE — 99215 OFFICE O/P EST HI 40 MIN: CPT | Performed by: INTERNAL MEDICINE

## 2020-11-05 PROCEDURE — G8752 SYS BP LESS 140: HCPCS | Performed by: INTERNAL MEDICINE

## 2020-11-05 PROCEDURE — G9711 PT HX TOT COL OR COLON CA: HCPCS | Performed by: INTERNAL MEDICINE

## 2020-11-05 PROCEDURE — G0444 DEPRESSION SCREEN ANNUAL: HCPCS | Performed by: INTERNAL MEDICINE

## 2020-11-05 PROCEDURE — 1101F PT FALLS ASSESS-DOCD LE1/YR: CPT | Performed by: INTERNAL MEDICINE

## 2020-11-05 PROCEDURE — G8536 NO DOC ELDER MAL SCRN: HCPCS | Performed by: INTERNAL MEDICINE

## 2020-11-05 PROCEDURE — G8420 CALC BMI NORM PARAMETERS: HCPCS | Performed by: INTERNAL MEDICINE

## 2020-11-05 RX ORDER — OMEPRAZOLE 40 MG/1
40 CAPSULE, DELAYED RELEASE ORAL DAILY
Qty: 90 CAP | Refills: 3 | Status: SHIPPED | OUTPATIENT
Start: 2020-11-05 | End: 2021-03-11 | Stop reason: ALTCHOICE

## 2020-11-05 RX ORDER — ATORVASTATIN CALCIUM 10 MG/1
TABLET, FILM COATED ORAL
Qty: 90 TAB | Refills: 3 | Status: SHIPPED | OUTPATIENT
Start: 2020-11-05 | End: 2021-11-15

## 2020-11-05 RX ORDER — HYDROCHLOROTHIAZIDE 12.5 MG/1
12.5 TABLET ORAL DAILY
Qty: 30 TAB | Refills: 5 | Status: SHIPPED | OUTPATIENT
Start: 2020-11-05 | End: 2021-04-28

## 2020-11-05 NOTE — ACP (ADVANCE CARE PLANNING)
Advance Care Planning       Advance Care Planning (ACP) Physician/NP/PA (Provider) Conversation        Date of ACP Conversation: 11/5/2020    Conversation Conducted with:   Patient with [address] Maker:    Current Designated Health Care Decision Maker:   (If there is a valid [place] named in the [address]" box in the ACP activity, but it is not visible above, be sure to open that field and then select the health care decision maker relationship (ie \"primary\") in the blank space to the right of the name.)    Note: Assess and validate information in current ACP documents, as indicated. If no Authorized Decision Maker has previously been identified, then patient chooses [place]:  \"Who would you like to name as your primary health care decision-maker? \"    Name: Srini Perea   Relationship:  Phone number:   Martinez Letynt this person be reached easily? \" YES  \"Who would you like to name as your back-up decision maker? \"   Name:   Relationship:  Phone number:   \"Can this person be reached easily? \" NO    Note: If the relationship of these Decision-Makers to the patient does NOT follow your state's Next of Kin hierarchy, recommend that patient complete ACP document that meets state-specific requirements to allow them to act on the patient's behalf when appropriate. Care Preferences:    Hospitalization: \"If your health worsens and it becomes clear that your chance of recovery is unlikely, what would your preference be regarding hospitalization? \"  The patient would prefer hospitalization. Ventilation: \"If you were in your present state of health and suddenly became very ill and were unable to breathe on your own, what would your preference be about the use of a ventilator (breathing machine) if it was available to you? \"    The patient would desire the use of a ventilator.     \"If your health worsens and it becomes clear that your chance of recovery is unlikely, what would your preference be about the use of a ventilator (breathing machine) if it was available to you? \"   yes      Resuscitation:  \"CPR works best to restart the heart when there is a sudden event, like a heart attack, in someone who is otherwise healthy. Unfortunately, CPR does not typically restart the heart for people who have serious health conditions or who are very sick. \"    \"In the event your heart stopped as a result of an underlying serious health condition, would you want attempts to be made to restart your heart (answer \"yes\" for attempt to resuscitate) or would you prefer a natural death (answer \"no\" for do not attempt to resuscitate)? \"   Yes, attempt to resuscitate. NOTE: If the patient has a valid advance directive AND provides care preference(s) that are inconsistent with that prior directive, advise the patient to consider either: creating a new advance directive that complies with state-specific requirements; or, if that is not possible, orally revoking that prior directive in accordance with state-specific requirements, which must be documented in the EHR.     Conversation Outcomes / Follow-Up Plan:   No change in amd      Length of Voluntary ACP Conversation in minutes:  16 minutes      Olivia Kingston MD

## 2020-11-05 NOTE — PROGRESS NOTES
This is the Subsequent Medicare Annual Wellness Exam    I have reviewed the patient's medical history in detail and updated the computerized patient record. History     Patient Active Problem List   Diagnosis Code    Osteopenia M85.80    GERD (gastroesophageal reflux disease) K21.9    Lumbar spinal stenosis M48.061    Advance directive on file Z78.9    Primary hypertension I10    Hyperlipidemia E78.5    Allergic rhinitis J30.9    Anxiety F41.9    History of colon cancer Z85.038    Chronic anemia D64.9     Past Medical History:   Diagnosis Date    Allergic rhinitis     Anemia     lifelong    Anxiety     Carotid duplex study     BICA<50% (2/11); BICA<50% (9/14)    Cervical spondylosis     Chronic constipation     Colon cancer ([facility] [address] 1989    s/p partial colon resection    Colon polyp     Dr Nilay Hammond 9/6/19    FHx: heart disease     GERD (gastroesophageal reflux disease)     H/O cardiovascular stress test 01/29/2009    neg, EF 78%, no wma     Hyperlipidemia     Hypertension     Lumbar spinal stenosis     s/p decomp Dr Scar Mason Microscopic hematuria     neg cysto Dr Jose Francisco Wiley 2013    Osteopenia     DEXA t score -0.6 spine, -1.9 hpi (1/12); -0.6 spine, -1.8 hip (5/14)    Primary insomnia 11/1/2016    Vascular abnormality     RABI 1.21, LABI 1.22 (2.15)      Past Surgical History:   Procedure Laterality Date    COLONOSCOPY N/A 6/3/2016    neg 11/11; Dr Scott Ramirez 6/3/16 neg; Dr Nanci Berkowitz 9/6/19 polyp    HX BUNIONECTOMY      x 2    HX HEMORRHOIDECTOMY      HX HYSTERECTOMY      HX ORTHOPAEDIC  07/2015    Dr Jocelyn Schmid L4-5 decomp and fusion     HX TONSILLECTOMY      MI CYSTOURETHROSCOPY  06/2013    Dr Jose Francisco Wiley neg     Current Outpatient Medications   Medication Sig Dispense Refill    hydroCHLOROthiazide (HYDRODIURIL) 12.5 mg tablet Take 1 Tab by mouth daily. 30 Tab 5    omeprazole (PRILOSEC) 40 mg capsule Take 1 Cap by mouth daily.  90 Cap 3    atorvastatin (LIPITOR) 10 mg tablet TAKE 1 TABLET EVERY DAY 90 Tab 3    ipratropium (ATROVENT) 0.03 % nasal spray USE 2 SPRAYS IN EACH NOSTRIL EVERY 12 HOURS 60 mL 3    lisinopril (PRINIVIL, ZESTRIL) 40 mg tablet TAKE 1 TABLET TWICE DAILY 180 Tab 3    ALPRAZolam (XANAX) 0.25 mg tablet Take 1 Tab by mouth three (3) times daily as needed for Anxiety. Max Daily Amount: 0.75 mg. 60 Tab 0    polyethylene glycol (MIRALAX) 17 gram/dose powder Take 17 g by mouth daily. 1530 g 3    cholecalciferol, vitamin D3, (VITAMIN D3) 2,000 unit Tab Take 1 Tab by mouth daily.  omega-3 fatty acids-vitamin e (FISH OIL) 1,000 mg Cap Take 1 Cap by mouth daily.  aspirin delayed-release 81 mg tablet Take 81 mg by mouth daily.  MULTIVITS,CA,MINERALS/IRON/FA (MULTI FOR HER PO) Take 1 Tab by mouth daily.  calcium-cholecalciferol, d3, (CALCIUM 600 + D) 600-125 mg-unit Tab Take 1 Tab by mouth daily.  meloxicam (MOBIC) 7.5 mg tablet Take 2 Tabs by mouth daily.  61 Tab 0     No Known Allergies    Family History   Problem Relation Age of Onset    Heart Disease Mother     Hypertension Mother     Heart Surgery Mother         triple bypass    Heart Disease Brother     Heart Attack Brother         massive MI    Hypertension Brother      Social History     Tobacco Use    Smoking status: Never Smoker    Smokeless tobacco: Never Used   Substance Use Topics    Alcohol use: Yes     Comment: socially       Depression Risk Factor Screening:     3 most recent PHQ Screens 11/5/2020   Little interest or pleasure in doing things Not at all   Feeling down, depressed, irritable, or hopeless Not at all   Total Score PHQ 2 0   Trouble falling or staying asleep, or sleeping too much -   Feeling tired or having little energy -   Poor appetite, weight loss, or overeating -   Feeling bad about yourself - or that you are a failure or have let yourself or your family down -   Trouble concentrating on things such as school, work, reading, or watching TV -   Moving or speaking so slowly that other people could have noticed; or the opposite being so fidgety that others notice -   Thoughts of being better off dead, or hurting yourself in some way -   PHQ 9 Score -   How difficult have these problems made it for you to do your work, take care of your home and get along with others -     SCREENINGS  Colonoscopy last done 9/19 Dr Gurdeep Chand last done 7/20  DEXA last done 5/14  Gyn last done >5 yrs     Immunization History   Administered Date(s) Administered    Influenza High Dose Vaccine PF 09/23/2014, 10/23/2015, 11/01/2016, 11/01/2017    Influenza Vaccine (Tri) Adjuvanted (>65 Yrs FLUAD TRI 80068) 11/15/2018, 10/24/2019    Influenza Vaccine PF 10/10/2013    Influenza Vaccine Split 10/17/2012    Influenza, High-dose, Quadrivalent (>65 Yrs Fluzone High Dose Quad 92279) 09/30/2020    Pneumococcal Conjugate (PCV-13) 04/08/2015    Pneumococcal Polysaccharide (PPSV-23) 01/01/2009, 03/02/2015, 11/15/2018    Pneumococcal Vaccine (Unspecified Type) 01/01/2009    Td 01/01/2009    Zoster Vaccine, Live 01/01/2011     Alcohol Risk Screen   Do you average more than 1 drink per night or more than 7 drinks a week:  No    On any one occasion in the past three months have you have had more than 3 drinks containing alcohol:  No        Functional Ability and Level of Safety:   Hearing: Hearing is good. Activities of Daily Living: The home contains: no safety equipment. Patient does total self care     Ambulation: with no difficulty     Fall Risk:  Fall Risk Assessment, last 12 mths 11/5/2020   Able to walk? Yes   Fall in past 12 months?  No     Abuse Screen:  Patient is not abused       Cognitive Screening   Has your family/caregiver stated any concerns about your memory: no     Patient Care Team   Patient Care Team:  Jade Alfaro MD as PCP - General (Internal Medicine)  Katya Temple DO (Cardiology)  Liliane Mckeon MD (Pain Management)  Suleman Hardin MD (Ophthalmology)  Sally German Kunal Pedroza RN as [address] [facility] (Internal Medicine)  Eleonora Merida MD (Neurosurgery)  Molly Smalls MD (Surgery)    Assessment/Plan   Education and counseling provided:  Are appropriate based on today's review and evaluation  End-of-Life planning (with patient's consent)  Influenza Vaccine  Screening Mammography  Colorectal cancer screening tests  Cardiovascular screening blood test  Bone mass measurement (DEXA)  Diabetes screening test    Diagnoses and all orders for this visit:    1. Medicare annual wellness visit, subsequent    2. Primary hypertension  -     METABOLIC PANEL, BASIC; Future    3. Gastroesophageal reflux disease without esophagitis    4. Osteopenia, unspecified location    5. Hyperlipidemia, unspecified hyperlipidemia type    6. History of colon cancer    7. Chronic anemia    8. Advanced directives, counseling/discussion  -     ADVANCE CARE PLANNING FIRST 30 MINS    9. Screening for depression  -     DEPRESSION SCREEN ANNUAL    10. Screening for diabetes mellitus    11. Postmenopausal state  -     DEXA BONE DENSITY STUDY AXIAL; Future    Other orders  -     hydroCHLOROthiazide (HYDRODIURIL) 12.5 mg tablet; Take 1 Tab by mouth daily. -     omeprazole (PRILOSEC) 40 mg capsule; Take 1 Cap by mouth daily.   -     atorvastatin (LIPITOR) 10 mg tablet; TAKE 1 TABLET EVERY DAY        Health Maintenance Due   Topic Date Due    Shingrix Vaccine Age 49> (1 of 2) 01/04/1995    Medicare Yearly Exam  08/17/2018    GLAUCOMA SCREENING Q2Y  03/01/2020     shingrix advocated

## 2020-11-05 NOTE — PROGRESS NOTES
Claudia Tobias presents today for   Chief Complaint   Patient presents with    Cholesterol Problem    Labs     completed on 10-29-20              Coordination of Care:  1. Have you been to the ER, urgent care clinic since your last visit? Hospitalized since your last visit? no    2. Have you seen or consulted any other health care providers outside of the [address] [place] [address] since your last visit? Include any pap smears or colon screening.  no

## 2020-11-05 NOTE — PATIENT INSTRUCTIONS

## 2020-11-08 DIAGNOSIS — Z78.0 POSTMENOPAUSAL STATE: ICD-10-CM

## 2020-11-11 ENCOUNTER — TELEPHONE (OUTPATIENT)
Dept: INTERNAL MEDICINE CLINIC | Age: 75
End: 2020-11-11

## 2020-11-11 NOTE — TELEPHONE ENCOUNTER
Patient reached and given message per DR. Cardoso Patient verbalizes understanding and had no further questions.

## 2020-11-11 NOTE — TELEPHONE ENCOUNTER
Assessment and plan:  1. Osteopenia. DEXA scheduled. Ca/d  2. GERD. start ppi and long discussion about avoidance measures and typical mgmt of this condition  3. Spinal stenosis. Prn meds  4. HTN. Continue current regimen. Will add low dose diuretic to see if we can avoid the big spikes, sfx discussed, f/u w labs 4 mos  5. HLP. Continue current regimen. 6. Anxiety. Continue current regimen. 7. H/o colon ca. Surveillance per GI  8. Anemia.   stable    Take diuretic in AM - otherwise she'll be urinating all night

## 2020-11-11 NOTE — TELEPHONE ENCOUNTER
Pt asking if she is suppose to take HYDROCHLOROTHIAZIDE with LISINOPRIL or does she take it in place of it * No surgery found * what time of the day should she take it please advise

## 2020-11-18 ENCOUNTER — HOSPITAL ENCOUNTER (OUTPATIENT)
Dept: BONE DENSITY | Age: 75
Discharge: HOME | End: 2020-11-18
Attending: INTERNAL MEDICINE
Payer: MEDICARE

## 2020-11-18 PROCEDURE — 77080 DXA BONE DENSITY AXIAL: CPT

## 2020-11-24 ENCOUNTER — IMPORTED ENCOUNTER (OUTPATIENT)
Dept: URBAN - METROPOLITAN AREA CLINIC 1 | Facility: CLINIC | Age: 75
End: 2020-11-24

## 2020-11-24 PROBLEM — Z96.1: Noted: 2020-11-24

## 2020-11-24 PROBLEM — H16.143: Noted: 2020-11-24

## 2020-11-24 PROBLEM — H04.123: Noted: 2020-11-24

## 2020-11-24 PROBLEM — H40.023: Noted: 2020-11-24

## 2020-11-24 PROCEDURE — 92250 FUNDUS PHOTOGRAPHY W/I&R: CPT

## 2020-11-24 PROCEDURE — 92004 COMPRE OPH EXAM NEW PT 1/>: CPT

## 2020-11-24 NOTE — PATIENT DISCUSSION
1.  [place] w/ PEK OU- Begin Restasis BID OU (erx). Recommend increase ATs TID OU routinely and AT Gel QHS OU. 2.  Glaucoma Suspect OU (CD 0.80/0.70): Negative family hx. Baseline photos done today showing disc cupping OU. Patient is considered high risk. Condition was discussed with patient and patient understands. Will continue to monitor patient for any progression in condition. Patient was advised to call us with any problems questions or concerns. 3.  Pseudophakia OU - [facility]Return for an appointment in 3 months 10/OCT with Dr. Sidney Alvarado.

## 2020-11-24 NOTE — PATIENT DISCUSSION
Glaucoma Suspect OU (CD 0.80/0.70): Negative family hx. Patient is considered high risk. Condition was discussed with patient and patient understands. Will continue to monitor patient for any progression in condition. Patient was advised to call us with any problems questions or concerns.

## 2020-11-29 ENCOUNTER — TELEPHONE (OUTPATIENT)
Dept: INTERNAL MEDICINE CLINIC | Age: 75
End: 2020-11-29

## 2020-11-30 NOTE — TELEPHONE ENCOUNTER
pls call    pls call    DEXA shows osteopenia b  ut not qualifying for treatment  Ca, vit d, weight bearing exericse  Recheck dexa in 2 years

## 2021-02-15 ENCOUNTER — TELEPHONE (OUTPATIENT)
Dept: INTERNAL MEDICINE CLINIC | Age: 76
End: 2021-02-15

## 2021-02-15 NOTE — TELEPHONE ENCOUNTER
Pt had first dose of moderna on 02/05/21- on 02/12- her arm had an itchy rash w/welfermin and it was tender ,02/13 rash was gone but she had 2-3 inch red area on her injection site .  She said as of today everthing is cleared up but was told she needed to contact her pcp

## 2021-02-15 NOTE — TELEPHONE ENCOUNTER
Pt aware of message below and verbalized understanding. No further questions or concerns from pt at this time.

## 2021-03-02 ENCOUNTER — IMPORTED ENCOUNTER (OUTPATIENT)
Dept: URBAN - METROPOLITAN AREA CLINIC 1 | Facility: CLINIC | Age: 76
End: 2021-03-02

## 2021-03-02 PROBLEM — H04.123: Noted: 2021-03-02

## 2021-03-02 PROBLEM — H16.143: Noted: 2021-03-02

## 2021-03-02 PROBLEM — H40.023: Noted: 2021-03-02

## 2021-03-02 PROCEDURE — 92133 CPTRZD OPH DX IMG PST SGM ON: CPT

## 2021-03-02 PROCEDURE — 92083 EXTENDED VISUAL FIELD XM: CPT

## 2021-03-02 PROCEDURE — 99213 OFFICE O/P EST LOW 20 MIN: CPT

## 2021-03-02 NOTE — PATIENT DISCUSSION
1.  Glaucoma Suspect OU (CD 0.80/0.70): OCT and HVF WNL OU. IOP stable. Negative family hx. Patient is considered high risk. Condition was discussed with patient and patient understands. Will continue to monitor patient for any progression in condition. Patient was advised to call us with any problems questions or concerns. 2.  [place] w/ improved PEK OU- Continue Restasis BID OU (erx). Recommend increase ATs TID OU routinely and AT Gel QHS OU. Return for an appointment in 6 months 10 with Dr. Michael Wilkins.

## 2021-03-04 ENCOUNTER — APPOINTMENT (OUTPATIENT)
Dept: INTERNAL MEDICINE CLINIC | Age: 76
End: 2021-03-04

## 2021-03-04 ENCOUNTER — HOSPITAL ENCOUNTER (OUTPATIENT)
Dept: LAB | Age: 76
Discharge: HOME | End: 2021-03-04
Payer: MEDICARE

## 2021-03-04 DIAGNOSIS — I10 PRIMARY HYPERTENSION: ICD-10-CM

## 2021-03-04 LAB
ANION GAP SERPL CALC-SCNC: 8 MMOL/L (ref 3–18)
BUN SERPL-MCNC: 23 MG/DL (ref 7–18)
BUN/CREAT SERPL: 22 (ref 12–20)
CALCIUM SERPL-MCNC: 9.2 MG/DL (ref 8.5–10.1)
CHLORIDE SERPL-SCNC: 105 MMOL/L (ref 100–111)
CO2 SERPL-SCNC: 28 MMOL/L (ref 21–32)
CREAT SERPL-MCNC: 1.04 MG/DL (ref 0.6–1.3)
GLUCOSE SERPL-MCNC: 80 MG/DL (ref 74–99)
POTASSIUM SERPL-SCNC: 3.9 MMOL/L (ref 3.5–5.5)
SODIUM SERPL-SCNC: 141 MMOL/L (ref 136–145)

## 2021-03-04 PROCEDURE — 36415 COLL VENOUS BLD VENIPUNCTURE: CPT

## 2021-03-04 PROCEDURE — 80048 BASIC METABOLIC PNL TOTAL CA: CPT

## 2021-03-07 NOTE — PROGRESS NOTES
68 y.o. WHITE female who presents for evaluation. At the last visit, we added low dose diuretic to her lisinopril 40. It seems to work, she is getting readings mostly in the low 100 range, sometimes in the 45G systolic actually. Still has occasional blips in the 160 range but may be twice in the last 4 months. Asymptomatic. No cardiovascular complaints. Remains active taking 2 mi walks 2-3x/wk in addition to watching the grandkids. She does feel tired a lot but admits that it could be from sleep issues. She has interrupted sleep at night for various reasons, sometimes from having to go to the bathroom, her , etc.  Uses melatonin but intermittently. No gi or gu issues outside of intermittent gerd without alarm complaints    She is known to have osteopenia but has not had f/u DEXA    Of note, she's been anemic 'her entire adult life'.   No bleeding from anywhere    LAST MEDICARE WELLNESS EXAM: 8/13/14, 1/19/16, 3/7/17, 11/6/20    Past Medical History:   Diagnosis Date    Allergic rhinitis     Anemia     lifelong    Anxiety     Carotid duplex study     BICA<50% (2/11); BICA<50% (9/14)    Cervical spondylosis     Chronic constipation     Colon cancer ([facility] [address] 1989    s/p partial colon resection    Colon polyp     Dr Fuad Ramirez 9/6/19    FHx: heart disease     GERD (gastroesophageal reflux disease)     H/O cardiovascular stress test 01/29/2009    neg, EF 78%, no wma     Hyperlipidemia     Hypertension     Lumbar spinal stenosis     s/p decomp Dr Sanchez Fuller    Microscopic hematuria     neg cysto Dr Gardner Shone 2013    Osteopenia     DEXA t score -0.6 spine, -1.9 hip (1/12); -0.6 spine, -1.8 hip (5/14); -1.9 spine, -2.2 hpi FRAX 12/2.8 (11/20)    Primary insomnia 11/1/2016    Vascular abnormality     RABI 1.21, LABI 1.22 (2.15)     Past Surgical History:   Procedure Laterality Date    COLONOSCOPY N/A 6/3/2016    neg 11/11; Dr Siena Soto 6/3/16 neg; Dr Merlin Jenkins 9/6/19 polyp    HX BUNIONECTOMY      x 2  HX HEMORRHOIDECTOMY      HX HYSTERECTOMY      HX ORTHOPAEDIC  07/2015    Dr Gerber Brumfield L4-5 decomp and fusion     HX TONSILLECTOMY      MD CYSTOURETHROSCOPY  06/2013    Dr Dallin العراقي neg     Social History     Socioeconomic History    Marital status:      Spouse name: Not on file    Number of children: 2    Years of education: Not on file    Highest education level: Not on file   Occupational History    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   Social Needs    Financial resource strain: Not on file    Food insecurity     Worry: Not on file     Inability: Not on file   [employer] needs     Medical: Not on file     Non-medical: Not on file   Tobacco Use    Smoking status: Never Smoker    Smokeless tobacco: Never Used   Substance and Sexual Activity    Alcohol use: Yes     Comment: socially    Drug use: No    Sexual activity: Not on file   Lifestyle    Physical activity     Days per week: Not on file     Minutes per session: Not on file    Stress: Not on file   Relationships    Social connections     Talks on phone: Not on file     Gets together: Not on file     Attends Religion service: Not on file     Active member of club or organization: Not on file     Attends meetings of clubs or organizations: Not on file     Relationship status: Not on file    Intimate partner violence     Fear of current or ex partner: Not on file     Emotionally abused: Not on file     Physically abused: Not on file     Forced sexual activity: Not on file   Other Topics Concern    Not on file   Social History Narrative    Not on file     Current Outpatient Medications   Medication Sig    hydroCHLOROthiazide (HYDRODIURIL) 12.5 mg tablet Take 1 Tab by mouth daily.     atorvastatin (LIPITOR) 10 mg tablet TAKE 1 TABLET EVERY DAY    ipratropium (ATROVENT) 0.03 % nasal spray USE 2 SPRAYS IN EACH NOSTRIL EVERY 12 HOURS    lisinopril (PRINIVIL, ZESTRIL) 40 mg tablet TAKE 1 TABLET TWICE DAILY (Patient taking differently: 40 mg daily.)    ALPRAZolam (XANAX) 0.25 mg tablet Take 1 Tab by mouth three (3) times daily as needed for Anxiety. Max Daily Amount: 0.75 mg.  polyethylene glycol (MIRALAX) 17 gram/dose powder Take 17 g by mouth daily.  cholecalciferol, vitamin D3, (VITAMIN D3) 2,000 unit Tab Take 1 Tab by mouth daily.  omega-3 fatty acids-vitamin e (FISH OIL) 1,000 mg Cap Take 1 Cap by mouth daily.  aspirin delayed-release 81 mg tablet Take 81 mg by mouth daily.  MULTIVITS,CA,MINERALS/IRON/FA (MULTI FOR HER PO) Take 1 Tab by mouth daily.  calcium-cholecalciferol, d3, (CALCIUM 600 + D) 600-125 mg-unit Tab Take 1 Tab by mouth daily. No current facility-administered medications for this visit. No Known Allergies    REVIEW OF SYSTEMS: colo 9/19 Dr Amber Sow, Karyna Valles 7/20. DEXA 5/14  Ophtho - no vision change or eye pain  Oral - no mouth pain, tongue or tooth problems  Ears - no hearing loss, ear pain, fullness, no swallowing problems  Cardiac - no CP, PND, orthopnea, edema, palpitations or syncope  Chest - no breast masses  Resp - no wheezing, chronic coughing, dyspnea  GI - no heartburn, nausea, vomiting, change in bowel habits, bleeding, hemorrhoids  Urinary - no dysuria, hematuria, flank pain, urgency, frequency  Genitals - no genital lesions, discharge, masses, ulceration, warts    Visit Vitals  /64   Pulse 68   Temp 97 °F (36.1 °C) (Temporal)   Resp 14   Ht 5' 1\" (1.549 m)   Wt 113 lb (51.3 kg)   BMI 21.35 kg/m²     A&O x3  Affect is appropriate. Mood stable  No apparent distress  Anicteric, no JVD, adenopathy or thyromegaly. No carotid bruits or radiated murmur  Lungs clear to auscultation, no wheezes or rales  Heart showed regular rate and rhythm. No murmur, rubs, gallops  Abdomen soft nontender, no hepatosplenomegaly or masses. Extremities without edema.   Pulses 1-2+ symmetrically    LABS  From 3/15 showed   gluc 81, cr 0.82, gfr>60, alt 8,   chol 175, tg 50, hdl 77, ldl-c 88,   wbc 5.0, hb 11.8, plt 194, tsh 3.54, vit d 40.2  From 9/15 showed                           uric 4.6  From 6/16 showed   gluc 81, cr 0.72, gfr>60, alt 16, chol 190, tg 98, hdl 67, ldl-c 103, wbc 5.8, hb 11.7, plt 236, tsh 2.24  From 6/17 showed   gluc 84, cr 0.94, gfr 59,  alt 25, chol 192, tg 66, hdl 75, ldl-c 104, wbc 5.3, hb 11.3, plt 201, tsh 1.30, vit d 35.1  From 3/18 showed   gluc 72, cr 0.94, gfr 58,  alt 30,           wbc 5.3, hb 11.2, plt 253, tsh 1.20, ft4 1.10  Form 4/19 showed   gluc 90, cr 0.84, gfr>60, alt 26, chol 181, tg 49, hdl 98, ldl-c 73,   wbc 3.9, hb 11.7, plt 217, tsh 0.96, ft4 1.10  From 9/19 showed   gluc 86, cr 0.90, gfr>60,           wbc 4.7, hb 11.9, plt 222  From 4/20 showed   gluc 82, cr 0.95, gfr 57,  alt 23, chol 187, tg 91, hdl 74, ldl-c 95,   wbc 4.4, hb 11.5, plt 221  From 11/20 showed gluc 81, cr 0.88, gfr>60, alt 28, chol 172, tg 86, hdl 71, ldl-c 84,   wbc 4.7, hb 11.3, plt 204    Results for orders placed or performed during the hospital encounter of 48/81/12   METABOLIC PANEL, BASIC   Result Value Ref Range    Sodium 141 136 - 145 mmol/L    Potassium 3.9 3.5 - 5.5 mmol/L    Chloride 105 100 - 111 mmol/L    CO2 28 21 - 32 mmol/L    Anion gap 8 3.0 - 18 mmol/L    Glucose 80 74 - 99 mg/dL    BUN 23 (H) 7.0 - 18 MG/DL    Creatinine 1.04 0.6 - 1.3 MG/DL    BUN/Creatinine ratio 22 (H) 12 - 20      GFR est AA >60 >60 ml/min/1.73m2    GFR est non-AA 52 (L) >60 ml/min/1.73m2    Calcium 9.2 8.5 - 10.1 MG/DL     We reviewed the patient's labs from the last several visits to point out trends in the numbers        Patient Active Problem List   Diagnosis Code    Osteopenia M85.80    GERD (gastroesophageal reflux disease) K21.9    Lumbar spinal stenosis M48.061    Advance directive on file Z78.9    Primary hypertension I10    Hyperlipidemia E78.5    Allergic rhinitis J30.9    Anxiety F41.9    History of colon cancer Z85.038    Chronic anemia D64.9     Assessment and plan:  1. Osteopenia. DEXA scheduled. Ca/d  2. GERD. PPI and avoidance measures  3. Spinal stenosis. Prn meds  4. HTN. Decrease lisinopril to 20 mg, continue HCTZ at 12.5. We can call in the combination if it works to minimize pill burden  5. HLP. Continue current regimen. 6. Anxiety. Continue current regimen. 7. H/o colon ca. Surveillance per GI  8. Anemia. stable      Above conditions discussed at length and patient vocalized understanding.   All questions answered to patient satisfaction      RTC 9/21

## 2021-03-11 ENCOUNTER — OFFICE VISIT (OUTPATIENT)
Dept: INTERNAL MEDICINE CLINIC | Age: 76
End: 2021-03-11
Payer: MEDICARE

## 2021-03-11 VITALS
RESPIRATION RATE: 14 BRPM | SYSTOLIC BLOOD PRESSURE: 104 MMHG | HEIGHT: 61 IN | WEIGHT: 113 LBS | DIASTOLIC BLOOD PRESSURE: 64 MMHG | HEART RATE: 68 BPM | TEMPERATURE: 97 F | BODY MASS INDEX: 21.34 KG/M2

## 2021-03-11 DIAGNOSIS — K21.9 GASTROESOPHAGEAL REFLUX DISEASE WITHOUT ESOPHAGITIS: ICD-10-CM

## 2021-03-11 DIAGNOSIS — D64.9 CHRONIC ANEMIA: ICD-10-CM

## 2021-03-11 DIAGNOSIS — E78.5 HYPERLIPIDEMIA, UNSPECIFIED HYPERLIPIDEMIA TYPE: ICD-10-CM

## 2021-03-11 DIAGNOSIS — F41.9 ANXIETY: ICD-10-CM

## 2021-03-11 DIAGNOSIS — I10 PRIMARY HYPERTENSION: Primary | ICD-10-CM

## 2021-03-11 PROCEDURE — G8754 DIAS BP LESS 90: HCPCS | Performed by: INTERNAL MEDICINE

## 2021-03-11 PROCEDURE — G8427 DOCREV CUR MEDS BY ELIG CLIN: HCPCS | Performed by: INTERNAL MEDICINE

## 2021-03-11 PROCEDURE — G8510 SCR DEP NEG, NO PLAN REQD: HCPCS | Performed by: INTERNAL MEDICINE

## 2021-03-11 PROCEDURE — G8420 CALC BMI NORM PARAMETERS: HCPCS | Performed by: INTERNAL MEDICINE

## 2021-03-11 PROCEDURE — G0463 HOSPITAL OUTPT CLINIC VISIT: HCPCS | Performed by: INTERNAL MEDICINE

## 2021-03-11 PROCEDURE — 99214 OFFICE O/P EST MOD 30 MIN: CPT | Performed by: INTERNAL MEDICINE

## 2021-03-11 PROCEDURE — G8536 NO DOC ELDER MAL SCRN: HCPCS | Performed by: INTERNAL MEDICINE

## 2021-03-11 PROCEDURE — 1090F PRES/ABSN URINE INCON ASSESS: CPT | Performed by: INTERNAL MEDICINE

## 2021-03-11 PROCEDURE — 1101F PT FALLS ASSESS-DOCD LE1/YR: CPT | Performed by: INTERNAL MEDICINE

## 2021-03-11 PROCEDURE — G8752 SYS BP LESS 140: HCPCS | Performed by: INTERNAL MEDICINE

## 2021-03-11 PROCEDURE — G8399 PT W/DXA RESULTS DOCUMENT: HCPCS | Performed by: INTERNAL MEDICINE

## 2021-03-11 NOTE — PROGRESS NOTES
Gianna Flores presents today for   Chief Complaint   Patient presents with    Hypertension     4 month f/u with labs              Depression Screening:  3 most recent PHQ Screens 3/11/2021   Little interest or pleasure in doing things Not at all   Feeling down, depressed, irritable, or hopeless Not at all   Total Score PHQ 2 0   Trouble falling or staying asleep, or sleeping too much -   Feeling tired or having little energy -   Poor appetite, weight loss, or overeating -   Feeling bad about yourself - or that you are a failure or have let yourself or your family down -   Trouble concentrating on things such as school, work, reading, or watching TV -   Moving or speaking so slowly that other people could have noticed; or the opposite being so fidgety that others notice -   Thoughts of being better off dead, or hurting yourself in some way -   PHQ 9 Score -   How difficult have these problems made it for you to do your work, take care of your home and get along with others -       Learning Assessment:  Learning Assessment 4/29/2019   PRIMARY LEARNER Patient   HIGHEST LEVEL OF EDUCATION - PRIMARY LEARNER  GRADUATED HIGH SCHOOL OR GED   BARRIERS PRIMARY LEARNER NONE   CO-LEARNER CAREGIVER No   PRIMARY LANGUAGE ENGLISH   LEARNER PREFERENCE PRIMARY DEMONSTRATION     -     -   ANSWERED BY patient   RELATIONSHIP SELF       Abuse Screening:  Abuse Screening Questionnaire 3/11/2021   Do you ever feel afraid of your partner? N   Are you in a relationship with someone who physically or mentally threatens you? N   Is it safe for you to go home? Y       Fall Risk  Fall Risk Assessment, last 12 mths 3/11/2021   Able to walk? Yes   Fall in past 12 months? 0   Do you feel unsteady? 0   Are you worried about falling 0           Coordination of Care:  1. Have you been to the ER, urgent care clinic since your last visit? Hospitalized since your last visit? no    2.  Have you seen or consulted any other health care providers outside of the [address] [place] [address] since your last visit? Include any pap smears or colon screening.  no

## 2021-03-19 ENCOUNTER — IMPORTED ENCOUNTER (OUTPATIENT)
Dept: URBAN - METROPOLITAN AREA CLINIC 1 | Facility: CLINIC | Age: 76
End: 2021-03-19

## 2021-03-19 PROBLEM — H15.102: Noted: 2021-03-19

## 2021-03-19 PROBLEM — H16.143: Noted: 2021-03-19

## 2021-03-19 PROBLEM — H04.123: Noted: 2021-03-19

## 2021-03-19 PROCEDURE — 99213 OFFICE O/P EST LOW 20 MIN: CPT

## 2021-03-19 NOTE — PATIENT DISCUSSION
1.  Episcleritis OS -- Begin Flarex QID OS (Sample given & Erx'd) The patient presented with pain and redness of his/her left eye. The inflammation is limited to the superficial episcleral tissue. 2.  ORVILLE w/ PEK OU -- Hold on Restasis until monday/tuesday (Pt stopped secondary to OS pain/burning). Cont ATs TID OU routinely. Cont Gel ATs QHS OU. 3.  Pseudophakia OU -- Doing well. Monova OS Near (Salina Schilder). 4.  Glaucoma Suspect OU -- (CD: 0.80/0.70) IOP stable. (-) Family Hx. Patient is considered High Risk. Return for an appointment in 1 week 10 with Dr. Crystal Isbell.

## 2021-03-26 ENCOUNTER — IMPORTED ENCOUNTER (OUTPATIENT)
Dept: URBAN - METROPOLITAN AREA CLINIC 1 | Facility: CLINIC | Age: 76
End: 2021-03-26

## 2021-03-26 PROBLEM — H15.102: Noted: 2021-03-26

## 2021-03-26 PROCEDURE — 99213 OFFICE O/P EST LOW 20 MIN: CPT

## 2021-03-26 NOTE — PATIENT DISCUSSION
1.  Episcleritis OS- Contiune Flarex BID OS for 1 week then taper down to QD for 1 week then D/C. The patient presented with pain and redness of his/her left eye. The inflammation is limited to the superficial episcleral tissue. 2.  ORVILLE w/ PEK OU- Hold on Restasis until monday/tuesday (Pt stopped secondary to OS pain/burning). Cont ATs TID OU routinely. Cont Gel ATs QHS OU. 3.  Pseudophakia OU- Doing well. Monova OS Near ([place]). 4.  Glaucoma Suspect OU- (CD: 0.80/0.70) IOP stable. (-) Family Hx. Patient is considered High Risk. Return for an appointment for Return as scheduled 09/02/2021 with Dr. Burak Castano.

## 2021-04-28 RX ORDER — HYDROCHLOROTHIAZIDE 12.5 MG/1
TABLET ORAL
Qty: 90 TAB | Refills: 3 | Status: SHIPPED | OUTPATIENT
Start: 2021-04-28 | End: 2022-05-06

## 2021-06-18 ENCOUNTER — TRANSCRIBE ORDER (OUTPATIENT)
Dept: SCHEDULING | Age: 76
End: 2021-06-18

## 2021-06-18 DIAGNOSIS — Z12.31 SCREENING MAMMOGRAM FOR HIGH-RISK PATIENT: Primary | ICD-10-CM

## 2021-07-13 ENCOUNTER — HOSPITAL ENCOUNTER (OUTPATIENT)
Dept: MAMMOGRAPHY | Age: 76
Discharge: HOME | End: 2021-07-13
Attending: INTERNAL MEDICINE
Payer: MEDICARE

## 2021-07-13 DIAGNOSIS — Z12.31 SCREENING MAMMOGRAM FOR HIGH-RISK PATIENT: ICD-10-CM

## 2021-07-13 PROCEDURE — 77063 BREAST TOMOSYNTHESIS BI: CPT

## 2021-07-29 NOTE — TELEPHONE ENCOUNTER
Last Visit: 3/11/21 with MD Kevin Forbes  Next Appointment: 21 with MD Kevin Forbes  Previous Refill Encounter(s): 20 #60 with 3 refills    Requested Prescriptions     Pending Prescriptions Disp Refills    ipratropium (ATROVENT) 21 mcg (0.03 %) nasal spray 60 mL 3     Si Sprays by Both Nostrils route every twelve (12) hours.

## 2021-08-02 RX ORDER — IPRATROPIUM BROMIDE 21 UG/1
2 SPRAY, METERED NASAL EVERY 12 HOURS
Qty: 60 ML | Refills: 3 | Status: SHIPPED | OUTPATIENT
Start: 2021-08-02 | End: 2022-08-03

## 2021-08-10 RX ORDER — HYDROCHLOROTHIAZIDE 12.5 MG/1
TABLET ORAL
Qty: 90 TABLET | Refills: 3 | Status: CANCELLED | OUTPATIENT
Start: 2021-08-10

## 2021-08-10 NOTE — TELEPHONE ENCOUNTER
[facility] with 3 [facility]ills    Last Visit: 3/11/21 with MD Emilia Naqvi  Next Appointment: 9/16/21 with MD Emilia Naqvi  Previous Refill Encounter(s): 10/3/19 #180 with 3 refills    Requested Prescriptions     Pending Prescriptions Disp Refills    lisinopriL (PRINIVIL, ZESTRIL) 40 mg tablet 90 Tablet 3     Sig: Take 1 Tablet by mouth daily.

## 2021-08-11 RX ORDER — LISINOPRIL 40 MG/1
40 TABLET ORAL DAILY
Qty: 90 TABLET | Refills: 3 | Status: SHIPPED | OUTPATIENT
Start: 2021-08-11

## 2021-09-02 ENCOUNTER — IMPORTED ENCOUNTER (OUTPATIENT)
Dept: URBAN - METROPOLITAN AREA CLINIC 1 | Facility: CLINIC | Age: 76
End: 2021-09-02

## 2021-09-02 PROBLEM — H04.123: Noted: 2021-09-02

## 2021-09-02 PROBLEM — H16.143: Noted: 2021-09-02

## 2021-09-02 PROCEDURE — 99213 OFFICE O/P EST LOW 20 MIN: CPT

## 2021-09-02 NOTE — PATIENT DISCUSSION
Pseudophakia OU- Doing well. Monova OS Near (Clara Decree). 4.  Glaucoma Suspect OU (CD: 0.80/0.70) - IOP stable. (-) Family Hx. Patient is considered High Risk.

## 2021-09-02 NOTE — PATIENT DISCUSSION
1.  ORVILLE w/ PEK OU- Continue Restasis BID OU and Recommend ATs TID OU routinely 2. Pseudophakia OU- Doing well. Monova OS Near (Roma Beltran). 3.  Glaucoma Suspect OU (CD: 0.80/0.70) - IOP stable. (-) Family Hx. Patient is considered High Risk. Return for an appointment in 6 months 30/OCT with Dr. Ector Cabral.

## 2021-09-09 ENCOUNTER — HOSPITAL ENCOUNTER (OUTPATIENT)
Dept: LAB | Age: 76
Discharge: HOME | End: 2021-09-09
Payer: MEDICARE

## 2021-09-09 ENCOUNTER — APPOINTMENT (OUTPATIENT)
Dept: INTERNAL MEDICINE CLINIC | Age: 76
End: 2021-09-09

## 2021-09-09 DIAGNOSIS — I10 PRIMARY HYPERTENSION: ICD-10-CM

## 2021-09-09 DIAGNOSIS — E78.5 HYPERLIPIDEMIA, UNSPECIFIED HYPERLIPIDEMIA TYPE: ICD-10-CM

## 2021-09-09 LAB
ALBUMIN SERPL-MCNC: 3.6 G/DL (ref 3.4–5)
ALBUMIN/GLOB SERPL: 1.2 {RATIO} (ref 0.8–1.7)
ALP SERPL-CCNC: 91 U/L (ref 45–117)
ALT SERPL-CCNC: 25 U/L (ref 13–56)
ANION GAP SERPL CALC-SCNC: 3 MMOL/L (ref 3–18)
AST SERPL-CCNC: 23 U/L (ref 10–38)
BILIRUB SERPL-MCNC: 0.8 MG/DL (ref 0.2–1)
BUN SERPL-MCNC: 23 MG/DL (ref 7–18)
BUN/CREAT SERPL: 24 (ref 12–20)
CALCIUM SERPL-MCNC: 8.6 MG/DL (ref 8.5–10.1)
CHLORIDE SERPL-SCNC: 110 MMOL/L (ref 100–111)
CHOLEST SERPL-MCNC: 184 MG/DL
CO2 SERPL-SCNC: 27 MMOL/L (ref 21–32)
CREAT SERPL-MCNC: 0.95 MG/DL (ref 0.6–1.3)
ERYTHROCYTE [DISTWIDTH] IN BLOOD BY AUTOMATED COUNT: 13.8 % (ref 11.6–14.5)
GLOBULIN SER CALC-MCNC: 3 G/DL (ref 2–4)
GLUCOSE SERPL-MCNC: 85 MG/DL (ref 74–99)
HCT VFR BLD AUTO: 31 % (ref 35–45)
HDLC SERPL-MCNC: 74 MG/DL (ref 40–60)
HDLC SERPL: 2.5 {RATIO} (ref 0–5)
HGB BLD-MCNC: 9.8 G/DL (ref 12–16)
LDLC SERPL CALC-MCNC: 97 MG/DL (ref 0–100)
LIPID PROFILE,FLP: ABNORMAL
MCH RBC QN AUTO: 28.7 PG (ref 24–34)
MCHC RBC AUTO-ENTMCNC: 31.6 G/DL (ref 31–37)
MCV RBC AUTO: 90.9 FL (ref 78–100)
PLATELET # BLD AUTO: 220 K/UL (ref 135–420)
PMV BLD AUTO: 10.5 FL (ref 9.2–11.8)
POTASSIUM SERPL-SCNC: 4.5 MMOL/L (ref 3.5–5.5)
PROT SERPL-MCNC: 6.6 G/DL (ref 6.4–8.2)
RBC # BLD AUTO: 3.41 M/UL (ref 4.2–5.3)
SODIUM SERPL-SCNC: 140 MMOL/L (ref 136–145)
TRIGL SERPL-MCNC: 65 MG/DL (ref ?–150)
VLDLC SERPL CALC-MCNC: 13 MG/DL
WBC # BLD AUTO: 5.7 K/UL (ref 4.6–13.2)

## 2021-09-09 PROCEDURE — 80053 COMPREHEN METABOLIC PANEL: CPT

## 2021-09-09 PROCEDURE — 80061 LIPID PANEL: CPT

## 2021-09-09 PROCEDURE — 36415 COLL VENOUS BLD VENIPUNCTURE: CPT

## 2021-09-09 PROCEDURE — 85027 COMPLETE CBC AUTOMATED: CPT

## 2021-09-11 NOTE — PROGRESS NOTES
68 y.o. WHITE/NON- female who presents for evaluation. No cardiovascular complaints. Her bp has been controlled when she checks. She used to take 2 mi walks but has 'gotten lazy'. She does work the yard and watch the [employer]    She has been feeling somewhat tired and reports that sleeping patterns are okay. She has baseline anemia for years. Has not noted any bleeding    No gi or gu issues     Only other complaint is neck pain without associated radicular complaints. She is known to have cervical spondylosis and actually called Dr. Dominga Montgomery office but apparently did not get an appointment; she wanting to see nonsurgical spine specialist at this time.     LAST MEDICARE WELLNESS EXAM: 8/13/14, 1/19/16, 3/7/17, 11/6/20    Past Medical History:   Diagnosis Date    Allergic rhinitis     Anemia     lifelong    Anxiety     Carotid duplex study     BICA<50% (2/11); BICA<50% (9/14)    Cervical spondylosis     Chronic constipation     Colon cancer ([facility] [address] 1989    s/p partial colon resection    Colon polyp     Dr Regino Romo 9/6/19    FHx: heart disease     GERD (gastroesophageal reflux disease)     H/O cardiovascular stress test 01/29/2009    neg, EF 78%, no wma     Hyperlipidemia     Hypertension     Lumbar spinal stenosis     s/p decomp Dr Ila Vernon    Microscopic hematuria     neg cysto Dr Delano Claude 2013    Osteopenia     DEXA t score -0.6 spine, -1.9 hip (1/12); -0.6 spine, -1.8 hip (5/14); -1.9 spine, -2.2 hpi FRAX 12/2.8 (11/20)    Primary insomnia 11/1/2016    Vascular abnormality     RABI 1.21, LABI 1.22 (2.15)     Past Surgical History:   Procedure Laterality Date    COLONOSCOPY N/A 6/3/2016    neg 11/11; Dr Sulema Braden 6/3/16 neg; Dr Fe Espinoza 9/6/19 polyp    HX BUNIONECTOMY      x 2    HX HEMORRHOIDECTOMY      HX HYSTERECTOMY      HX ORTHOPAEDIC  07/2015    Dr Ila Vernon L4-5 decomp and fusion     HX TONSILLECTOMY      MO CYSTOURETHROSCOPY  06/2013    Dr Delano Claude neg     Social History Socioeconomic History    Marital status:      Spouse name: Not on file    Number of children: 2    Years of education: Not on file    Highest education level: Not on file   Occupational History    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   Tobacco Use    Smoking status: Never Smoker    Smokeless tobacco: Never Used   Substance and Sexual Activity    Alcohol use: Yes     Comment: socially    Drug use: No    Sexual activity: Not on file   Other Topics Concern    Not on file   Social History Narrative    Not on file     Social Determinants of Health     Financial Resource Strain:     Difficulty of Paying Living Expenses:    Food Insecurity:     Worried About Running Out of Food in the Last Year:     [address] in the Last Year:    Transportation Needs:     Lack of Transportation (Medical):  Lack of Transportation (Non-Medical):    Physical Activity:     Days of Exercise per Week:     Minutes of Exercise per Session:    Stress:     Feeling of Stress :    Social Connections:     Frequency of Communication with Friends and Family:     Frequency of Social Gatherings with Friends and Family:     Attends Judaism Services:     Active Member of Clubs or Organizations:     Attends Club or Organization Meetings:     Marital Status:    Intimate Partner Violence:     Fear of Current or Ex-Partner:     Emotionally Abused:     Physically Abused:     Sexually Abused:      Current Outpatient Medications   Medication Sig    lisinopriL (PRINIVIL, ZESTRIL) 40 mg tablet Take 1 Tablet by mouth daily. (Patient taking differently: Take 40 mg by mouth daily. Takes half tablet daily (20 mg))    ipratropium (ATROVENT) 21 mcg (0.03 %) nasal spray 2 Sprays by Both Nostrils route every twelve (12) hours.  atorvastatin (LIPITOR) 10 mg tablet TAKE 1 TABLET EVERY DAY    polyethylene glycol (MIRALAX) 17 gram/dose powder Take 17 g by mouth daily.  (Patient taking differently: Take 17 g by mouth daily as needed.)    aspirin delayed-release 81 mg tablet Take 81 mg by mouth daily.  MULTIVITS,CA,MINERALS/IRON/FA (MULTI FOR HER PO) Take 1 Tab by mouth daily.  calcium-cholecalciferol, d3, (CALCIUM 600 + D) 600-125 mg-unit Tab Take 1 Tab by mouth daily.  hydroCHLOROthiazide (HYDRODIURIL) 12.5 mg tablet TAKE 1 TABLET EVERY DAY (Patient not taking: Reported on 9/16/2021)    ALPRAZolam (XANAX) 0.25 mg tablet Take 1 Tab by mouth three (3) times daily as needed for Anxiety. Max Daily Amount: 0.75 mg. (Patient not taking: Reported on 9/16/2021)    cholecalciferol, vitamin D3, (VITAMIN D3) 2,000 unit Tab Take 1 Tab by mouth daily. (Patient not taking: Reported on 9/16/2021)    omega-3 fatty acids-vitamin e (FISH OIL) 1,000 mg Cap Take 1 Cap by mouth daily. (Patient not taking: Reported on 9/16/2021)     No current facility-administered medications for this visit. No Known Allergies    REVIEW OF SYSTEMS: colo 9/19 Michael Moreno 7/20. DEXA 5/14  Ophtho - no vision change or eye pain  Oral - no mouth pain, tongue or tooth problems  Ears - no hearing loss, ear pain, fullness, no swallowing problems  Cardiac - no CP, PND, orthopnea, edema, palpitations or syncope  Chest - no breast masses  Resp - no wheezing, chronic coughing, dyspnea  GI - no heartburn, nausea, vomiting, change in bowel habits, bleeding, hemorrhoids  Urinary - no dysuria, hematuria, flank pain, urgency, frequency    Visit Vitals  /63   Pulse 62   Temp 97.3 °F (36.3 °C) (Temporal)   Resp 12   Ht 5' 1\" (1.549 m)   Wt 113 lb (51.3 kg)   SpO2 99%   BMI 21.35 kg/m²     A&O x3  Affect is appropriate. Mood stable  No apparent distress  Anicteric, no JVD, adenopathy or thyromegaly. No carotid bruits or radiated murmur  Lungs clear to auscultation, no wheezes or rales  Heart showed regular rate and rhythm. No murmur, rubs, gallops  Abdomen soft nontender, no hepatosplenomegaly or masses. Extremities without edema.   Pulses 1-2+ symmetrically    LABS  From 3/15 showed   gluc 81, cr 0.82, gfr>60, alt 8,   chol 175, tg 50, hdl 77, ldl-c 88,   wbc 5.0, hb 11.8, plt 194, tsh 3.54, vit d 40.2  From 9/15 showed                           uric 4.6  From 6/16 showed   gluc 81, cr 0.72, gfr>60, alt 16, chol 190, tg 98, hdl 67, ldl-c 103, wbc 5.8, hb 11.7, plt 236, tsh 2.24  From 6/17 showed   gluc 84, cr 0.94, gfr 59,  alt 25, chol 192, tg 66, hdl 75, ldl-c 104, wbc 5.3, hb 11.3, plt 201, tsh 1.30, vit d 35.1  From 3/18 showed   gluc 72, cr 0.94, gfr 58,  alt 30,           wbc 5.3, hb 11.2, plt 253, tsh 1.20, ft4 1.10  Form 4/19 showed   gluc 90, cr 0.84, gfr>60, alt 26, chol 181, tg 49, hdl 98, ldl-c 73,   wbc 3.9, hb 11.7, plt 217, tsh 0.96, ft4 1.10  From 9/19 showed   gluc 86, cr 0.90, gfr>60,           wbc 4.7, hb 11.9, plt 222  From 4/20 showed   gluc 82, cr 0.95, gfr 57,  alt 23, chol 187, tg 91, hdl 74, ldl-c 95,   wbc 4.4, hb 11.5, plt 221  From 11/20 showed gluc 81, cr 0.88, gfr>60, alt 28, chol 172, tg 86, hdl 71, ldl-c 84,   wbc 4.7, hb 11.3, plt 204  From 3/21 showed   gluc 80, cr 1.04, gfr 52    Results for orders placed or performed during the hospital encounter of 09/09/21   CBC W/O DIFF   Result Value Ref Range    WBC 5.7 4.6 - 13.2 K/uL    RBC 3.41 (L) 4.20 - 5.30 M/uL    HGB 9.8 (L) 12.0 - 16.0 g/dL    HCT 31.0 (L) 35.0 - 45.0 %    MCV 90.9 78.0 - 100.0 FL    MCH 28.7 24.0 - 34.0 PG    MCHC 31.6 31.0 - 37.0 g/dL    RDW 13.8 11.6 - 14.5 %    PLATELET 741 714 - 797 K/uL    MPV 10.5 9.2 - 11.8 FL   LIPID PANEL   Result Value Ref Range    LIPID PROFILE          Cholesterol, total 184 <200 MG/DL    Triglyceride 65 <150 MG/DL    HDL Cholesterol 74 (H) 40 - 60 MG/DL    LDL, calculated 97 0 - 100 MG/DL    VLDL, calculated 13 MG/DL    CHOL/HDL Ratio 2.5 0 - 5.0     METABOLIC PANEL, COMPREHENSIVE   Result Value Ref Range    Sodium 140 136 - 145 mmol/L    Potassium 4.5 3.5 - 5.5 mmol/L    Chloride 110 100 - 111 mmol/L    CO2 27 21 - 32 mmol/L Anion gap 3 3.0 - 18 mmol/L    Glucose 85 74 - 99 mg/dL    BUN 23 (H) 7.0 - 18 MG/DL    Creatinine 0.95 0.6 - 1.3 MG/DL    BUN/Creatinine ratio 24 (H) 12 - 20      GFR est AA >60 >60 ml/min/1.73m2    GFR est non-AA 57 (L) >60 ml/min/1.73m2    Calcium 8.6 8.5 - 10.1 MG/DL    Bilirubin, total 0.8 0.2 - 1.0 MG/DL    ALT (SGPT) 25 13 - 56 U/L    AST (SGOT) 23 10 - 38 U/L    Alk. phosphatase 91 45 - 117 U/L    Protein, total 6.6 6.4 - 8.2 g/dL    Albumin 3.6 3.4 - 5.0 g/dL    Globulin 3.0 2.0 - 4.0 g/dL    A-G Ratio 1.2 0.8 - 1.7       We reviewed the patient's labs from the last several visits to point out trends in the numbers        Patient Active Problem List   Diagnosis Code    Osteopenia M85.80    GERD (gastroesophageal reflux disease) K21.9    Lumbar spinal stenosis M48.061    Advance directive on file Z78.9    Primary hypertension I10    Hyperlipidemia E78.5    Allergic rhinitis J30.9    Anxiety F41.9    History of colon cancer Z85.038    Chronic anemia D64.9     Assessment and plan:  1. Osteopenia. Weight bearing exercise, Ca/d  2. GERD. PPI and avoidance measures  3. Spinal stenosis. Continue current  4. HTN. Controlled on belkis and diuretic  5. HLP. Continue statin and FO  6. Anxiety. Continue current regimen. 7. H/o colon ca. Surveillance per GI    NEW ISSUES  8. Worsening anemia. Proceed with evaluation  9. Tiredness. Check thyroid tests      RTC 3/22    Above conditions discussed at length and patient vocalized understanding. All questions answered to patient satisfaction            ICD-10-CM ICD-9-CM    1. B12 deficiency  E53.8 266.2 VITAMIN B12 & FOLATE   2. Anemia, unspecified type  D64.9 285.9 FERRITIN      IRON PROFILE      PROTEIN ELECTROPHORESIS      RETICULOCYTE COUNT      TSH AND FREE T4   3. Primary hypertension  L95 322.5 METABOLIC PANEL, BASIC      CBC W/O DIFF   4. Hyperlipidemia, unspecified hyperlipidemia type  E78.5 272.4    5.  Cervical spondylosis  M47.812 721.0 REFERRAL TO PHYSICIAL MEDICINE REHAB

## 2021-09-16 ENCOUNTER — OFFICE VISIT (OUTPATIENT)
Dept: INTERNAL MEDICINE CLINIC | Age: 76
End: 2021-09-16
Payer: MEDICARE

## 2021-09-16 ENCOUNTER — HOSPITAL ENCOUNTER (OUTPATIENT)
Dept: LAB | Age: 76
Discharge: HOME | End: 2021-09-16
Payer: MEDICARE

## 2021-09-16 VITALS
OXYGEN SATURATION: 99 % | RESPIRATION RATE: 12 BRPM | SYSTOLIC BLOOD PRESSURE: 134 MMHG | TEMPERATURE: 97.3 F | WEIGHT: 113 LBS | BODY MASS INDEX: 21.34 KG/M2 | HEART RATE: 62 BPM | HEIGHT: 61 IN | DIASTOLIC BLOOD PRESSURE: 63 MMHG

## 2021-09-16 DIAGNOSIS — I10 PRIMARY HYPERTENSION: ICD-10-CM

## 2021-09-16 DIAGNOSIS — D64.9 ANEMIA, UNSPECIFIED TYPE: ICD-10-CM

## 2021-09-16 DIAGNOSIS — E53.8 B12 DEFICIENCY: Primary | ICD-10-CM

## 2021-09-16 DIAGNOSIS — E53.8 B12 DEFICIENCY: ICD-10-CM

## 2021-09-16 DIAGNOSIS — E78.5 HYPERLIPIDEMIA, UNSPECIFIED HYPERLIPIDEMIA TYPE: ICD-10-CM

## 2021-09-16 DIAGNOSIS — M47.812 CERVICAL SPONDYLOSIS: ICD-10-CM

## 2021-09-16 LAB
ANION GAP SERPL CALC-SCNC: 3 MMOL/L (ref 3–18)
BUN SERPL-MCNC: 14 MG/DL (ref 7–18)
BUN/CREAT SERPL: 14 (ref 12–20)
CALCIUM SERPL-MCNC: 8.9 MG/DL (ref 8.5–10.1)
CHLORIDE SERPL-SCNC: 111 MMOL/L (ref 100–111)
CO2 SERPL-SCNC: 29 MMOL/L (ref 21–32)
CREAT SERPL-MCNC: 1 MG/DL (ref 0.6–1.3)
ERYTHROCYTE [DISTWIDTH] IN BLOOD BY AUTOMATED COUNT: 13.8 % (ref 11.6–14.5)
FERRITIN SERPL-MCNC: 28 NG/ML (ref 8–388)
FOLATE SERPL-MCNC: >20 NG/ML (ref 3.1–17.5)
GLUCOSE SERPL-MCNC: 82 MG/DL (ref 74–99)
HCT VFR BLD AUTO: 32.2 % (ref 35–45)
HGB BLD-MCNC: 10.1 G/DL (ref 12–16)
IRON SATN MFR SERPL: 17 % (ref 20–50)
IRON SERPL-MCNC: 58 UG/DL (ref 50–175)
MCH RBC QN AUTO: 28.1 PG (ref 24–34)
MCHC RBC AUTO-ENTMCNC: 31.4 G/DL (ref 31–37)
MCV RBC AUTO: 89.7 FL (ref 78–100)
PLATELET # BLD AUTO: 203 K/UL (ref 135–420)
PMV BLD AUTO: 10.3 FL (ref 9.2–11.8)
POTASSIUM SERPL-SCNC: 4.4 MMOL/L (ref 3.5–5.5)
RBC # BLD AUTO: 3.59 M/UL (ref 4.2–5.3)
RETICS/RBC NFR AUTO: 1.7 % (ref 0.5–2.5)
SODIUM SERPL-SCNC: 143 MMOL/L (ref 136–145)
T4 FREE SERPL-MCNC: 0.9 NG/DL (ref 0.7–1.5)
TIBC SERPL-MCNC: 344 UG/DL (ref 250–450)
TSH SERPL DL<=0.05 MIU/L-ACNC: 1.02 UIU/ML (ref 0.36–3.74)
VIT B12 SERPL-MCNC: 290 PG/ML (ref 211–911)
WBC # BLD AUTO: 6 K/UL (ref 4.6–13.2)

## 2021-09-16 PROCEDURE — G8752 SYS BP LESS 140: HCPCS | Performed by: INTERNAL MEDICINE

## 2021-09-16 PROCEDURE — 82746 ASSAY OF FOLIC ACID SERUM: CPT

## 2021-09-16 PROCEDURE — 36415 COLL VENOUS BLD VENIPUNCTURE: CPT

## 2021-09-16 PROCEDURE — G8536 NO DOC ELDER MAL SCRN: HCPCS | Performed by: INTERNAL MEDICINE

## 2021-09-16 PROCEDURE — G8754 DIAS BP LESS 90: HCPCS | Performed by: INTERNAL MEDICINE

## 2021-09-16 PROCEDURE — 83540 ASSAY OF IRON: CPT

## 2021-09-16 PROCEDURE — 99214 OFFICE O/P EST MOD 30 MIN: CPT | Performed by: INTERNAL MEDICINE

## 2021-09-16 PROCEDURE — 84443 ASSAY THYROID STIM HORMONE: CPT

## 2021-09-16 PROCEDURE — 85045 AUTOMATED RETICULOCYTE COUNT: CPT

## 2021-09-16 PROCEDURE — G8427 DOCREV CUR MEDS BY ELIG CLIN: HCPCS | Performed by: INTERNAL MEDICINE

## 2021-09-16 PROCEDURE — 80048 BASIC METABOLIC PNL TOTAL CA: CPT

## 2021-09-16 PROCEDURE — G8399 PT W/DXA RESULTS DOCUMENT: HCPCS | Performed by: INTERNAL MEDICINE

## 2021-09-16 PROCEDURE — G8420 CALC BMI NORM PARAMETERS: HCPCS | Performed by: INTERNAL MEDICINE

## 2021-09-16 PROCEDURE — 84155 ASSAY OF PROTEIN SERUM: CPT

## 2021-09-16 PROCEDURE — 85027 COMPLETE CBC AUTOMATED: CPT

## 2021-09-16 PROCEDURE — G8510 SCR DEP NEG, NO PLAN REQD: HCPCS | Performed by: INTERNAL MEDICINE

## 2021-09-16 PROCEDURE — 1101F PT FALLS ASSESS-DOCD LE1/YR: CPT | Performed by: INTERNAL MEDICINE

## 2021-09-16 PROCEDURE — 1090F PRES/ABSN URINE INCON ASSESS: CPT | Performed by: INTERNAL MEDICINE

## 2021-09-16 PROCEDURE — 82728 ASSAY OF FERRITIN: CPT

## 2021-09-16 NOTE — PROGRESS NOTES
Renetta Cedeño presents today for   Chief Complaint   Patient presents with    Follow-up     6 month    Hypertension    Cholesterol Problem    Labs     9-9-21       Coordination of Care:  1. Have you been to the ER, urgent care clinic since your last visit? Hospitalized since your last visit? NO    2. Have you seen or consulted any other health care providers outside of the [address] [place] [address] since your last visit? Include any pap smears or colon screening.  YES

## 2021-09-20 DIAGNOSIS — F41.9 ANXIETY: ICD-10-CM

## 2021-09-20 LAB
ALBUMIN SERPL ELPH-MCNC: 3.8 G/DL (ref 2.9–4.4)
ALBUMIN/GLOB SERPL: 1.3 {RATIO} (ref 0.7–1.7)
ALPHA1 GLOB SERPL ELPH-MCNC: 0.2 G/DL (ref 0–0.4)
ALPHA2 GLOB SERPL ELPH-MCNC: 0.7 G/DL (ref 0.4–1)
B-GLOBULIN SERPL ELPH-MCNC: 1 G/DL (ref 0.7–1.3)
GAMMA GLOB SERPL ELPH-MCNC: 0.9 G/DL (ref 0.4–1.8)
GLOBULIN SER CALC-MCNC: 2.9 G/DL (ref 2.2–3.9)
M PROTEIN SERPL ELPH-MCNC: NORMAL G/DL
PROT SERPL-MCNC: 6.7 G/DL (ref 6–8.5)

## 2021-09-20 RX ORDER — ALPRAZOLAM 0.25 MG/1
0.25 TABLET ORAL
Qty: 60 TABLET | Refills: 0 | Status: SHIPPED | OUTPATIENT
Start: 2021-09-20

## 2021-09-20 RX ORDER — HYDROCHLOROTHIAZIDE 12.5 MG/1
12.5 TABLET ORAL DAILY
Qty: 90 TABLET | Refills: 3 | Status: CANCELLED | OUTPATIENT
Start: 2021-09-20

## 2021-09-20 NOTE — TELEPHONE ENCOUNTER
Patient still has refills at the pharmacy for HCTZ. I contacted the pharmacy to confirm and requested they refill it for the patient. No further action needed.

## 2021-09-20 NOTE — TELEPHONE ENCOUNTER
Last Visit: 9/16/21 with MD Carlos Key  Next Appointment: 3/16/22 with MD Carlos Key  Previous Refill Encounter(s): 4/8/19 #60    Requested Prescriptions     Pending Prescriptions Disp Refills    ALPRAZolam (Xanax) 0.25 mg tablet 60 Tablet 0     Sig: Take 1 Tablet by mouth three (3) times daily as needed for Anxiety. Max Daily Amount: 0.75 mg.

## 2021-09-24 ENCOUNTER — TELEPHONE (OUTPATIENT)
Dept: INTERNAL MEDICINE CLINIC | Age: 76
End: 2021-09-24

## 2021-09-24 DIAGNOSIS — D50.9 IRON DEFICIENCY ANEMIA, UNSPECIFIED IRON DEFICIENCY ANEMIA TYPE: Primary | ICD-10-CM

## 2021-09-24 NOTE — TELEPHONE ENCOUNTER
pls call    Results for orders placed or performed during the hospital encounter of 09/16/21   FERRITIN   Result Value Ref Range    Ferritin 28 8 - 388 NG/ML   PROTEIN ELECTROPHORESIS   Result Value Ref Range    Protein, total 6.7 6.0 - 8.5 g/dL    Albumin 3.8 2.9 - 4.4 g/dL    Alpha-1-globulin 0.2 0.0 - 0.4 g/dL    ALPHA-2 GLOBULIN 0.7 0.4 - 1.0 g/dL    Beta globulin 1.0 0.7 - 1.3 g/dL    Gamma globulin 0.9 0.4 - 1.8 g/dL    M-Zelalem Not Observed Not Observed g/dL    Globulin, total 2.9 2.2 - 3.9 g/dL    A/G ratio 1.3 0.7 - 1.7     RETICULOCYTE COUNT   Result Value Ref Range    Reticulocyte count 1.7 0.5 - 2.5 %   TSH AND FREE T4   Result Value Ref Range    TSH 1.02 0.36 - 3.74 uIU/mL    T4, Free 0.9 0.7 - 1.5 NG/DL   METABOLIC PANEL, BASIC   Result Value Ref Range    Sodium 143 136 - 145 mmol/L    Potassium 4.4 3.5 - 5.5 mmol/L    Chloride 111 100 - 111 mmol/L    CO2 29 21 - 32 mmol/L    Anion gap 3 3.0 - 18 mmol/L    Glucose 82 74 - 99 mg/dL    BUN 14 7.0 - 18 MG/DL    Creatinine 1.00 0.6 - 1.3 MG/DL    BUN/Creatinine ratio 14 12 - 20      GFR est AA >60 >60 ml/min/1.73m2    GFR est non-AA 54 (L) >60 ml/min/1.73m2    Calcium 8.9 8.5 - 10.1 MG/DL   CBC W/O DIFF   Result Value Ref Range    WBC 6.0 4.6 - 13.2 K/uL    RBC 3.59 (L) 4.20 - 5.30 M/uL    HGB 10.1 (L) 12.0 - 16.0 g/dL    HCT 32.2 (L) 35.0 - 45.0 %    MCV 89.7 78.0 - 100.0 FL    MCH 28.1 24.0 - 34.0 PG    MCHC 31.4 31.0 - 37.0 g/dL    RDW 13.8 11.6 - 14.5 %    PLATELET 228 763 - 603 K/uL    MPV 10.3 9.2 - 11.8 FL   VITAMIN B12 & FOLATE   Result Value Ref Range    Vitamin B12 290 211 - 911 pg/mL    Folate >20.0 (H) 3.10 - 17.50 ng/mL   IRON PROFILE   Result Value Ref Range    Iron 58 50 - 175 ug/dL    TIBC 344 250 - 450 ug/dL    Iron % saturation 17 (L) 20 - 50 %     Iron low  Suggest follow up evaluation GI Dr Sulema Mistry  - ordered

## 2021-10-11 ENCOUNTER — OFFICE VISIT (OUTPATIENT)
Dept: ORTHOPEDIC SURGERY | Age: 76
End: 2021-10-11
Payer: MEDICARE

## 2021-10-11 VITALS
HEART RATE: 70 BPM | DIASTOLIC BLOOD PRESSURE: 78 MMHG | TEMPERATURE: 97.1 F | HEIGHT: 61 IN | SYSTOLIC BLOOD PRESSURE: 118 MMHG | BODY MASS INDEX: 21.37 KG/M2 | WEIGHT: 113.2 LBS | OXYGEN SATURATION: 100 %

## 2021-10-11 DIAGNOSIS — M54.2 NECK PAIN: Primary | ICD-10-CM

## 2021-10-11 DIAGNOSIS — M79.18 MYOFASCIAL MUSCLE PAIN: ICD-10-CM

## 2021-10-11 DIAGNOSIS — M50.30 DDD (DEGENERATIVE DISC DISEASE), CERVICAL: ICD-10-CM

## 2021-10-11 DIAGNOSIS — M62.838 MUSCLE SPASM: ICD-10-CM

## 2021-10-11 DIAGNOSIS — M47.812 CERVICAL FACET JOINT SYNDROME: ICD-10-CM

## 2021-10-11 PROCEDURE — 72040 X-RAY EXAM NECK SPINE 2-3 VW: CPT | Performed by: PHYSICAL MEDICINE & REHABILITATION

## 2021-10-11 PROCEDURE — G8432 DEP SCR NOT DOC, RNG: HCPCS | Performed by: PHYSICAL MEDICINE & REHABILITATION

## 2021-10-11 PROCEDURE — G8752 SYS BP LESS 140: HCPCS | Performed by: PHYSICAL MEDICINE & REHABILITATION

## 2021-10-11 PROCEDURE — 1101F PT FALLS ASSESS-DOCD LE1/YR: CPT | Performed by: PHYSICAL MEDICINE & REHABILITATION

## 2021-10-11 PROCEDURE — G8754 DIAS BP LESS 90: HCPCS | Performed by: PHYSICAL MEDICINE & REHABILITATION

## 2021-10-11 PROCEDURE — G8427 DOCREV CUR MEDS BY ELIG CLIN: HCPCS | Performed by: PHYSICAL MEDICINE & REHABILITATION

## 2021-10-11 PROCEDURE — G8399 PT W/DXA RESULTS DOCUMENT: HCPCS | Performed by: PHYSICAL MEDICINE & REHABILITATION

## 2021-10-11 PROCEDURE — G8420 CALC BMI NORM PARAMETERS: HCPCS | Performed by: PHYSICAL MEDICINE & REHABILITATION

## 2021-10-11 PROCEDURE — G8536 NO DOC ELDER MAL SCRN: HCPCS | Performed by: PHYSICAL MEDICINE & REHABILITATION

## 2021-10-11 PROCEDURE — 99203 OFFICE O/P NEW LOW 30 MIN: CPT | Performed by: PHYSICAL MEDICINE & REHABILITATION

## 2021-10-11 PROCEDURE — 1090F PRES/ABSN URINE INCON ASSESS: CPT | Performed by: PHYSICAL MEDICINE & REHABILITATION

## 2021-10-11 RX ORDER — AMMONIUM LACTATE 12 G/100G
LOTION TOPICAL
COMMUNITY
Start: 2021-08-06

## 2021-10-11 RX ORDER — DOXYCYCLINE 100 MG/1
CAPSULE ORAL
Status: ON HOLD | COMMUNITY
End: 2021-12-20 | Stop reason: CLARIF

## 2021-10-11 RX ORDER — CHLORTHALIDONE 25 MG/1
TABLET ORAL
Status: ON HOLD | COMMUNITY
End: 2021-12-20 | Stop reason: CLARIF

## 2021-10-11 NOTE — PATIENT INSTRUCTIONS
Neck Arthritis: Exercises  Introduction  Here are some examples of exercises for you to try. The exercises may be suggested for a condition or for rehabilitation. Start each exercise slowly. Ease off the exercises if you start to have pain. You will be told when to start these exercises and which ones will work best for you. How to do the exercises  Neck stretches to the side    1. This stretch works best if you keep your shoulder down as you lean away from it. To help you remember to do this, start by relaxing your shoulders and lightly holding on to your thighs or your chair. 2. Tilt your head toward your shoulder and hold for 15 to 30 seconds. Let the weight of your head stretch your muscles. 3. Repeat 2 to 4 times toward each shoulder. Chin tuck    1. Lie on the floor with a rolled-up towel under your neck. Your head should be touching the floor. 2. Slowly bring your chin toward your chest.  3. Hold for a count of 6, and then relax for up to 10 seconds. 4. Repeat 8 to 12 times. Active cervical rotation    1. Sit in a firm chair, or stand up straight. 2. Keeping your chin level, turn your head to the right, and hold for 15 to 30 seconds. 3. Turn your head to the left and hold for 15 to 30 seconds. 4. Repeat 2 to 4 times to each side. Shoulder blade squeeze    1. While standing, squeeze your shoulder blades together. 2. Do not raise your shoulders up as you are squeezing. 3. Hold for 6 seconds. 4. Repeat 8 to 12 times. Shoulder rolls    1. Sit comfortably with your feet shoulder-width apart. You can also do this exercise standing up. 2. Roll your shoulders up, then back, and then down in a smooth, circular motion. 3. Repeat 2 to 4 times. Follow-up care is a key part of your treatment and safety. Be sure to make and go to all appointments, and call your doctor if you are having problems. It's also a good idea to know your test results and keep a list of the medicines you take.   Where can you learn more? Go to http://www.gray.com/  Enter F883 in the search box to learn more about \"Neck Arthritis: Exercises. \"  Current as of: July 1, 2021               Content Version: 13.0  © 5346-0030 Healthwise, Incorporated. Care instructions adapted under license by [employer] (which disclaims liability or warranty for this information). If you have questions about a medical condition or this instruction, always ask your healthcare professional. [place] [address] any warranty or liability for your use of this information.

## 2021-10-11 NOTE — LETTER
10/13/2021    Patient: Yasmine Bhat   YOB: 1945   Date of Visit: 10/11/2021     Nicko Bearden MD  [address] [facility] [address]  [address]  [facility]    Dear Nicko Bearden MD,      Thank you for referring Ms. Thi Smith to [facility] for evaluation. My notes for this consultation are attached. If you have questions, please do not hesitate to call me. I look forward to following your patient along with you.       Sincerely,    Rob Loza MD

## 2021-10-11 NOTE — PROGRESS NOTES
Patricio Ferguson presents today for   Chief Complaint   Patient presents with    Neck Pain       Is someone accompanying this pt? no    Is the patient using any DME equipment during OV? no    Depression Screening:  3 most recent PHQ Screens 9/16/2021   Little interest or pleasure in doing things Not at all   Feeling down, depressed, irritable, or hopeless Not at all   Total Score PHQ 2 0   Trouble falling or staying asleep, or sleeping too much -   Feeling tired or having little energy -   Poor appetite, weight loss, or overeating -   Feeling bad about yourself - or that you are a failure or have let yourself or your family down -   Trouble concentrating on things such as school, work, reading, or watching TV -   Moving or speaking so slowly that other people could have noticed; or the opposite being so fidgety that others notice -   Thoughts of being better off dead, or hurting yourself in some way -   PHQ 9 Score -   How difficult have these problems made it for you to do your work, take care of your home and get along with others -       Learning Assessment:  Learning Assessment 4/29/2019   PRIMARY LEARNER Patient   HIGHEST LEVEL OF EDUCATION - PRIMARY LEARNER  GRADUATED HIGH SCHOOL OR GED   BARRIERS PRIMARY LEARNER NONE   CO-LEARNER CAREGIVER No   PRIMARY LANGUAGE ENGLISH   LEARNER PREFERENCE PRIMARY DEMONSTRATION     -     -   ANSWERED BY patient   RELATIONSHIP SELF       Abuse Screening:  Abuse Screening Questionnaire 9/16/2021   Do you ever feel afraid of your partner? N   Are you in a relationship with someone who physically or mentally threatens you? N   Is it safe for you to go home? Y       Fall Risk  Fall Risk Assessment, last 12 mths 9/16/2021   Able to walk? Yes   Fall in past 12 months? 0   Do you feel unsteady? 0   Are you worried about falling 0       OPIOID RISK TOOL  No flowsheet data found. Coordination of Care:  1.  Have you been to the ER, urgent care clinic since your last visit? no Hospitalized since your last visit? no    2. Have you seen or consulted any other health care providers outside of the [address] [place] [address] since your last visit? Dermatologist Include any pap smears or colon screening.  no

## 2021-10-11 NOTE — PROGRESS NOTES
[facility]  [address] [place], [facility] [address]  Phone: [phone]  Fax: [phone]    NEW PATIENT  Pt's YOB: 1945    ASSESSMENT   Diagnoses and all orders for this visit:    1. Neck pain  -     AMB POC XRAY, SPINE, CERVICAL; 2 OR 3  -     REFERRAL TO PHYSICAL THERAPY    2. Cervical facet joint syndrome  -     REFERRAL TO PHYSICAL THERAPY    3. Muscle spasm  -     REFERRAL TO PHYSICAL THERAPY    4. DDD (degenerative disc disease), cervical  -     REFERRAL TO PHYSICAL THERAPY    5. Myofascial muscle pain         IMPRESSION AND PLAN:  Marcy Campbell is a 68 y.o. female with history of neck pain. She complains of a long history of progressive neck pain. Pt has been taking Tylenol and Aleve as needed. 1) Pt was given information on cervical arthritis exercises. 2) Discussed treatment options with the patient including medication and physical therapy. 3) She was referred to cervical physical therapy. 4) Pt was given information on how to use a TheraCane. 5) Discussed ordering a cervical MRI pending persistent symptoms. 6) Ms. Augie Jane has a reminder for a \"due or due soon\" health maintenance. I have asked that she contact her primary care provider, Gaurang Nicholson MD, for follow-up on this health maintenance. 7)  demonstrated consistency with prescribing. Follow-up and Dispositions    · Return in about 6 weeks (around 11/22/2021) for PT follow up, Medication follow up. HISTORY OF PRESENT ILLNESS:  Marcy Campbell is a 68 y.o. female with history of neck pain. Pt presents to the office today as a new patient referred by her PCP, Gaurang Nicholson MD. She complains of a long history of progressive neck pain. Pt notes that at times she has difficulty turning her neck secondary to pain, most noticeable when driving. She denies any weakness in the arm, pain or numbness in the arms or hands, or issues with balance. She admits to very occasionally dropping objects and frequent headaches. Pt has been taking Tylenol and Aleve as needed. She denies recently taking oral steroids. Pt notes that she attended 1 session of physical therapy but had to discontinue sessions due to the COVID-19 shutdown. She reports that she used to undergo chiropractic adjustments years ago with improvement in her headaches. Pt notes that she had a lumbar fusion about 6 years ago by Dr. Chinedu Leong but she denies any previous cervical surgeries. Pt at this time desires to proceed with physical therapy.     Pain Scale: 5/10     PCP: Lee Delgado MD    Past Medical History:   Diagnosis Date    Allergic rhinitis     Anemia     lifelong    Anxiety     Carotid duplex study     BICA<50% (2/11); BICA<50% (9/14)    Cervical spondylosis     Chronic constipation     Colon cancer ([facility] [address] 1989    s/p partial colon resection    Colon polyp     Dr Alain Klinefelter 9/6/19    FHx: heart disease     GERD (gastroesophageal reflux disease)     H/O cardiovascular stress test 01/29/2009    neg, EF 78%, no wma     Hyperlipidemia     Hypertension     Lumbar spinal stenosis     s/p decomp Dr Matilda Vieraumbaung Microscopic hematuria     neg cysto Dr Otto Jeffries 2013    Osteopenia     DEXA t score -0.6 spine, -1.9 hip (1/12); -0.6 spine, -1.8 hip (5/14); -1.9 spine, -2.2 hpi FRAX 12/2.8 (11/20)    Primary insomnia 11/1/2016    Vascular abnormality     RABI 1.21, LABI 1.22 (2.15)        Social History     Socioeconomic History    Marital status:      Spouse name: Not on file    Number of children: 2    Years of education: Not on file    Highest education level: Not on file   Occupational History    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   Tobacco Use    Smoking status: Never Smoker    Smokeless tobacco: Never Used   Substance and Sexual Activity    Alcohol use: Yes     Comment: socially    Drug use: No    Sexual activity: Not on file   Other Topics Concern    Not on file   Social History Narrative    Not on file     Social Determinants of Health     Financial Resource Strain:     Difficulty of Paying Living Expenses:    Food Insecurity:     Worried About Running Out of Food in the Last Year:     [address] in the Last Year:    Transportation Needs:     Lack of Transportation (Medical):  Lack of Transportation (Non-Medical):    Physical Activity:     Days of Exercise per Week:     Minutes of Exercise per Session:    Stress:     Feeling of Stress :    Social Connections:     Frequency of Communication with Friends and Family:     Frequency of Social Gatherings with Friends and Family:     Attends Oriental orthodox Services:     Active Member of Clubs or Organizations:     Attends Club or Organization Meetings:     Marital Status:    Intimate Partner Violence:     Fear of Current or Ex-Partner:     Emotionally Abused:     Physically Abused:     Sexually Abused:        Current Outpatient Medications   Medication Sig Dispense Refill    ammonium lactate (LAC-HYDRIN) 12 % lotion       chlorthalidone (HYGROTON) 25 mg tablet chlorthalidone 25 mg tablet      doxycycline (VIBRAMYCIN) 100 mg capsule doxycycline hyclate 100 mg capsule      ALPRAZolam (Xanax) 0.25 mg tablet Take 1 Tablet by mouth three (3) times daily as needed for Anxiety. Max Daily Amount: 0.75 mg. 60 Tablet 0    lisinopriL (PRINIVIL, ZESTRIL) 40 mg tablet Take 1 Tablet by mouth daily. (Patient taking differently: Take 40 mg by mouth daily. Takes half tablet daily (20 mg)) 90 Tablet 3    ipratropium (ATROVENT) 21 mcg (0.03 %) nasal spray 2 Sprays by Both Nostrils route every twelve (12) hours. 60 mL 3    hydroCHLOROthiazide (HYDRODIURIL) 12.5 mg tablet TAKE 1 TABLET EVERY DAY 90 Tab 3    atorvastatin (LIPITOR) 10 mg tablet TAKE 1 TABLET EVERY DAY 90 Tab 3    polyethylene glycol (MIRALAX) 17 gram/dose powder Take 17 g by mouth daily.  (Patient taking differently: Take 17 g by mouth daily as needed.) 1530 g 3    aspirin delayed-release 81 mg tablet Take 81 mg by mouth daily.  MULTIVITS,CA,MINERALS/IRON/FA (MULTI FOR HER PO) Take 1 Tab by mouth daily.  calcium-cholecalciferol, d3, (CALCIUM 600 + D) 600-125 mg-unit Tab Take 1 Tab by mouth daily.  omega-3 fatty acids-vitamin e (FISH OIL) 1,000 mg Cap Take 1 Cap by mouth daily. (Patient not taking: Reported on 9/16/2021)         No Known Allergies    REVIEW OF SYSTEMS    Constitutional: Negative for fever, chills, or weight change. Respiratory: Negative for cough or shortness of breath. Cardiovascular: Negative for chest pain or palpitations. Gastrointestinal: Negative for acid reflux, change in bowel habits, or constipation. Genitourinary: Negative for dysuria and flank pain. Musculoskeletal: Positive for cervical pain. Skin: Negative for rash. Neurological: Positive for headaches. Negative for dizziness or numbness. Endo/Heme/Allergies: Negative for increased bruising. Psychiatric/Behavioral: Negative for difficulty with sleep. As per HPI    PHYSICAL EXAMINATION  Visit Vitals  /78 (BP 1 Location: Right arm, BP Patient Position: Sitting, BP Cuff Size: Adult)   Pulse 70   Temp 97.1 °F (36.2 °C) (Tympanic)   Ht 5' 1\" (1.549 m)   Wt 113 lb 3.2 oz (51.3 kg)   SpO2 100%   BMI 21.39 kg/m²       Constitutional: Awake, alert, and in no acute distress. HEENT: Normocephalic. Atraumatic. Oropharynx is moist and clear. PERRL. EOMI. Sclerae are nonicteric  Cardiovascular: Regular rate and rhythm  Lungs: Clear to auscultation bilaterally  Abdomen: Soft and nontender. Bowel sounds are present  Neurological: 1+ symmetrical DTRs in the upper extremities. 1+ symmetrical DTRs in the lower extremities. Sensation to light touch is intact. Negative Ewing's sign bilaterally. Skin: warm, dry, and intact. Musculoskeletal: Very tight across the upper trapezius bilaterally.  Decreased range of motion with side to side cervical flexion, L>R. Good range of motion with cervical extension and axial loading. Good range of motion in both shoulders on all planes. No pain with extension, axial loading, or forward flexion. No pain with internal or external rotation of her hips. Negative straight leg raise bilaterally       Biceps  Triceps Deltoids Wrist Ext Wrist Flex Hand Intrin   Right +4/5 +4/5 +4/5 +4/5 +4/5 +4/5   Left +4/5 +4/5 +4/5 +4/5 +4/5 +4/5      Hip Flex  Quads Hamstrings Ankle DF EHL Ankle PF   Right +4/5 +4/5 +4/5 +4/5 +4/5 +4/5   Left +4/5 +4/5 +4/5 +4/5 +4/5 +4/5     IMAGING:    Cervical spine 2V x-rays from 10/11/2021 were personally reviewed with the patient and demonstrated:  Diffuse degenerative changes. Written by Sailaja Porter, as dictated by Maribell Arguelles MD.  I, Dr. Maribell Arguelles confirm that all documentation is accurate.

## 2021-10-20 ENCOUNTER — HOSPITAL ENCOUNTER (OUTPATIENT)
Dept: PHYSICAL THERAPY | Age: 76
Discharge: HOME | End: 2021-10-20
Attending: PHYSICAL MEDICINE & REHABILITATION
Payer: MEDICARE

## 2021-10-20 PROCEDURE — 97140 MANUAL THERAPY 1/> REGIONS: CPT

## 2021-10-20 PROCEDURE — 97110 THERAPEUTIC EXERCISES: CPT

## 2021-10-20 PROCEDURE — 97161 PT EVAL LOW COMPLEX 20 MIN: CPT

## 2021-10-20 NOTE — PROGRESS NOTES
PT DAILY TREATMENT NOTE/CERVICAL [id number]    Patient Name: Riri Stewart  Date:10/20/2021  : 1945  [x]  Patient  Verified  Payor: MEDICARE RAILROAD / Plan: [place] MEDICARE RAILROAD / Product Type: Medicare /    In time: 2:15  Out time:3:00  Total Treatment Time (min): 45  Visit #: 1 of 10    Medicare/BCBS Only   Total Timed Codes (min):  25 1:1 Treatment Time:  45     Treatment Area: Cervicalgia [M54.2]  Spondylosis without myelopathy or radiculopathy, cervical region [M47.812]  Other muscle spasm [M62.838]  Other cervical disc degeneration, unspecified cervical region [M50.30]    SUBJECTIVE  Pain Level (0-10 scale): /10  [x]constant []intermittent []improving []worsening []no change since onset    Any medication changes, allergies to medications, adverse drug reactions, diagnosis change, or new procedure performed?: [x] No    [] Yes (see summary sheet for update)  Subjective functional status/changes:     PLOF: Patient is I with ADLs and ambulates without use of an AD. Patient mainly stays busy with housework and gardening.   Limitations to PLOF: None  Mechanism of Injury: Insidious onset > 3 years ago with recent worsening   Current symptoms/Complaints: Patient complains of neck and bilateral shoulder pain and stiffness   Previous Treatment/Compliance: Patient saw a chiropractor years ago  PMHx/Surgical Hx: Patient reports osteoporosis, arthritis, and high BP; Past surgical history: lower spinal fusion 2017  Work Hx: Patient is retired   Living Situation: Patient lives with spouse  Pt Goals: Improve neck mobility and symptoms  Barriers: []pain []financial []time []transportation []other  Motivation: Good   Substance use: []Alcohol []Tobacco []other:   FABQ Score: []low []elevate  Cognition: A & O x 4    Other:    OBJECTIVE/EXAMINATION  Domestic Life: Lives with spouse   Activity/Recreational Limitations: Patient endorses difficulty turning her head side to side (left more difficult than right) and looking up. This limits patient's ease of driving. Mobility: I, no AD  Self Care: I    20 min [x]Eval                  []Re-Eval       15 min Therapeutic Exercise:  [x] See flow sheet :   Rationale: increase ROM, increase strength and improve coordination to improve the patients ability to perform ADLs without being limited by neck pain or stiffness    10 min Manual Therapy:  DTM and TPR to patient's bilateral upper traps and rhomboids with the patient positioned in supine   The manual therapy interventions were performed at a separate and distinct time from the therapeutic activities interventions. Rationale: decrease pain and decrease trigger points to improve the patient's ability to perform ADLs without being limited by neck pain or stiffness          With   [x] TE   [x] TA   [x] neuro   [] other: Patient Education: [x] Review HEP    [] Progressed/Changed HEP based on:   [] positioning   [] body mechanics   [] transfers   [] heat/ice application    [] other:      Other Objective/Functional Measures: Good tolerance for all components of HEP    Physical Therapy Evaluation Cervical Spine     SUBJECTIVE  Chief Complaint: Neck pain and stiffness    Mechanism of injury: Insidious onset     Symptoms  Aggravated by:   [] Bending [] Sitting [] Standing [x] Reaching Overhead   [] Moving [] Cough [] Sneeze [] Eating   [] AM  [] PM  Lying:  [] sup   [] pro   [] sidelying   [] Other:     Eased by:    [] Bending [] Sitting [] Standing Lying: [] sup  [] pro  [] sidelying   [] Moving [] AM  [] PM  [] Other:     General Health:  Red Flags Indicated? [] Yes    [] No  [] Yes [x] No Recent weight change (If yes, due to dieting?  [] Yes  [] No)   [] Yes [x] No Persistent cough  [] Yes [x] No Unremitting pain at night  [] Yes [x] No Dizziness  [] Yes [x] No Blurred vision  [] Yes [x] No Hands more cold or painful in cold weather  [] Yes [x] No Ringing in ears  [] Yes [x] No Difficulty swallowing  [] Yes [x] No Dysfunction of bowel or bladder  [] Yes [x] No Recent illness within past 3 weeks (i.e, cold, flu)  [] Yes [x] No Jaw pain    Past History/Treatments:    Diagnostic Tests: [] Lab work [x] X-rays    [] CT [] MRI     [] Other:  Results: 10/11/21 - degenerative changes     Functional Status  Prior level of function: See above  Present functional limitations: See above  What position do you sleep in?: Right sidelying     Headaches: Do you have headaches? [x] Yes   [] No  How often do you get headaches? 2x/month  How long does the headache last? A couple of hours  What aggravates it? Headaches usually begin after the patient has been performing a lot of work and her shoulders and neck feel stiff  What relieves it? Aleve or Tylenol  Does the headache coincide with any other symptoms (visual disturbances, light sensitivity)? No  Where is the headache? Base of skull  Does it change locations? No  Other:    OBJECTIVE  Posture: [] WNL  Head Position: Forward head   Shoulder/Scapular Position: Patient sits with rounded shoulders  C-Kyphosis:  [] increased   [] decreased   C-Lordosis:   [] increased   [x] decreased   T-Kyphosis:  [x] increased   [] decreased  T-Lordosis:   [] increased   [] decreased     TMJ: [x] N/A [] Abnormal - ROM:   Palpation:    Active Movements: [] N/A   [] Too acute   [] Other:  ROM % AROM Comments:pain, area   Forward flexion 30 deg. Stiffness   Extension 25 deg. Stiffness   SB right 20 deg. Stiffness   SB left  15 deg. Stiffness   Rotation right 25 deg. Stiffness   Rotation left 22 deg.  Stiffness     PROM: Decreased joint mobility throughout the cervical spine noted during supine PA mobs    Palpation:  [] Min  [x] Mod  [] Severe    Location: TTP about upper traps (left > right)  [] Min  [] Mod  [] Severe    Location:  [] Min  [] Mod  [] Severe    Location:    Neuro Screen (myotome/dematome): [x] WNL  Myotome Level Muscle Test Myotome Level Muscle Test   C5 Shoulder Adduction - Deltoid C8 Finger Flexors   C6 Wrist Extension T1 Finger Abduction - Interossei   C7 Elbow Extension     Comments:    Upper Limb Tension Tests: [x] N/A       Ulnar: [] R    [] L    [] +    [] -       Median: [] R    [] L    [] +    [] -       Radial: [] R    [] L    [] +    [] -    Muscle Flexibility: [] N/A    Scalenes: [] WNL    [x] Tight    [x] R    [x] L   Upper Trap: [] WNL    [x] Tight    [x] R    [x] L   Levator: [] WNL    [x] Tight    [x] R    [x] L    Pain Level (0-10 scale) post treatment: 4/10    ASSESSMENT/Changes in Function: See POC    Patient will continue to benefit from skilled PT services to modify and progress therapeutic interventions, address functional mobility deficits, address ROM deficits, address strength deficits, analyze and address soft tissue restrictions, analyze and cue movement patterns, analyze and modify body mechanics/ergonomics, assess and modify postural abnormalities and instruct in home and community integration to attain remaining goals.      [x]  See Plan of Care  []  See progress note/recertification  []  See Discharge Summary         Progress towards goals / Updated goals:  See POC    PLAN  [x]  Upgrade activities as tolerated     [x]  Continue plan of care  []  Update interventions per flow sheet       []  Discharge due to:_  []  Other:_      Artur Cardenas, PT 10/20/2021  1:58 PM

## 2021-10-28 ENCOUNTER — HOSPITAL ENCOUNTER (OUTPATIENT)
Dept: PHYSICAL THERAPY | Age: 76
Discharge: HOME | End: 2021-10-28
Attending: PHYSICAL MEDICINE & REHABILITATION
Payer: MEDICARE

## 2021-10-28 PROCEDURE — 97110 THERAPEUTIC EXERCISES: CPT

## 2021-10-28 PROCEDURE — 97530 THERAPEUTIC ACTIVITIES: CPT

## 2021-10-28 PROCEDURE — 97140 MANUAL THERAPY 1/> REGIONS: CPT

## 2021-10-28 NOTE — PROGRESS NOTES
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 OBJECTIVE    Modality rationale: decrease pain and increase tissue extensibility to improve the patients ability to to assist with performing ADL's and functional tasks without limitations.    Min Type Additional Details    [] Estim:  []Unatt       []IFC  []Premod                        []Other:  []w/ice   []w/heat  Position:  Location:    [] Estim: []Att    []TENS instruct  []NMES                    []Other:  []w/US   []w/ice   []w/heat  Position:  Location:    []  Traction: [] Cervical       []Lumbar                       [] Prone          []Supine                       []Intermittent   []Continuous Lbs:  [] before manual  [] after manual    []  Ultrasound: []Continuous   [] Pulsed                           []1MHz   []3MHz W/cm2:  Location:    []  Iontophoresis with dexamethasone         Location: [] Take home patch   [] In clinic   10 []  Ice     [x]  heat  []  Ice massage  []  Laser   []  Anodyne Position:supine  Location: C/S    []  Laser with stim  []  Other: Position:  Location:    []  Vasopneumatic Device    []  Right     []  Left  Pre-treatment girth:  Post-treatment girth:  Measured at (location):  Pressure:       [] lo [] med [] hi   Temperature: [] lo [] med [] hi   [] Skin assessment post-treatment:  []intact []redness- no adverse reaction  []redness - adverse reaction:      23 min Therapeutic Exercise:  [x] See flow sheet :   Rationale: increase ROM and increase strength to improve the patients overall muscle endurance and activity tolerance for ADL's and functional tasks. 10 min Therapeutic Activity:  [x]  See flow sheet :   Rationale: increase strength and improve coordination  to improve the patients ability to safely perform dynamic activities and to improve functional performance of ADL's. 15 min Manual Therapy:  DTM and stretching to cervical musculature   The manual therapy interventions were performed at a separate and distinct time from the therapeutic activities interventions. Rationale: decrease pain, increase ROM, increase tissue extensibility and decrease trigger points to assist with improving functional cervical ROM. With   [] TE   [] TA   [] neuro   [] other: Patient Education: [x] Review HEP    [] Progressed/Changed HEP based on:   [] positioning   [] body mechanics   [] transfers   [] heat/ice application    [] other:      Other Objective/Functional Measures:    Initiated exercises set a initial evaluation. Verbal cues required for proper form. Muscle spasms present at (B) UT, levator scap and occipitals. Pain Level (0-10 scale) post treatment: 3/10 stiffness    ASSESSMENT/Changes in Function:   Patient is progressing well towards goals. Progressed exercises, as per flow sheet, to assist with increasing cervical strength and ROM. Patient responded well to today's session, as evident by, no increase in cervical pain and decreased stiffness and muscle spasms.    Patient will continue to benefit from skilled PT services to modify and progress therapeutic interventions, address functional mobility deficits, address ROM deficits, address strength deficits, analyze and address soft tissue restrictions, analyze and cue movement patterns, analyze and modify body mechanics/ergonomics, assess and modify postural abnormalities and instruct in home and community integration to attain remaining goals. []  See Plan of Care  []  See progress note/recertification  []  See Discharge Summary         Progress towards goals / Updated goals:  Short Term Goals: To be accomplished in 2 treatments:  1. Patient will be I with HEP in order to enhance carryover from PT sessions              Evaluation: HEP administered   Long Term Goals: To be accomplished in 10 treatments:  1. Patient will improve bilateral cervical rotation AROM to 50 deg in order to ease ADLs including driving              Evaluation: right rotation to 25 deg, left rotation to 22 deg  2. Patient will report a 50% improvement in headache symptoms and frequency in order to enhance quality of life              Evaluation: patient reporting that headaches occur 2x/month and last roughly 2 hours  3.  Patient will report an overall 75% improvement in symptoms to enhance quality of life and ADL participation              Evaluation: pain rated 4/10 at rest and 6/10 at worst       PLAN  [x]  Upgrade activities as tolerated     [x]  Continue plan of care  []  Update interventions per flow sheet       []  Discharge due to:_  []  Other:_      Jomar Owens PTA 10/28/2021  10:46 AM    Future Appointments   Date Time Provider Hedy Hilton   11/1/2021  9:45 AM Mehrdad Bound, PTA [facility]   11/3/2021  9:45 AM Claudia Espino, PT [facility]   11/8/2021  9:45 AM Mehrdad Bound, PTA [facility]   11/10/2021 11:15 AM Katelyn Flores, PT [facility]   11/12/2021 11:15 AM Mehrdad Bound, PTA [facility]   11/22/2021 11:15 AM Mehrdad Bound, PTA [facility]   11/24/2021 11:15 AM Dexter Maria R Velasco, PT [facility]   11/29/2021 11:15 AM Wilmer Osorio, PT [facility]   12/1/2021 10:00 AM Ya Turner MD [facility] BS AMB   3/9/2022  9:45 AM IO LAB VISIT [facility] BS AMB   3/16/2022 10:40 AM Marshall Cardoso MD [facility] BS AMB

## 2021-11-01 ENCOUNTER — HOSPITAL ENCOUNTER (OUTPATIENT)
Dept: PHYSICAL THERAPY | Age: 76
Discharge: HOME | End: 2021-11-01
Attending: PHYSICAL MEDICINE & REHABILITATION
Payer: MEDICARE

## 2021-11-01 PROCEDURE — 97140 MANUAL THERAPY 1/> REGIONS: CPT

## 2021-11-01 PROCEDURE — 97110 THERAPEUTIC EXERCISES: CPT

## 2021-11-01 PROCEDURE — 97530 THERAPEUTIC ACTIVITIES: CPT

## 2021-11-01 NOTE — PROGRESS NOTES
PT DAILY TREATMENT NOTE     Patient Name: Cecilia Jorgensen  Date:2021  : 1945  [x]  Patient  Verified  Payor: MEDICARE RAILROAD / Plan: [place] [employer] / Product Type: Medicare /    In time: 9:49 am Out time:10:50 am  Total Treatment Time (min): 61  Visit #: 3 of 10    Medicare/BCBS Only   Total Timed Codes (min):  61 1:1 Treatment Time:  61       Treatment Area: Cervicalgia [M54.2]  Spondylosis without myelopathy or radiculopathy, cervical region [M47.812]  Other muscle spasm [M62.838]  Other cervical disc degeneration, unspecified cervical region [M50.30]    SUBJECTIVE  Pain Level (0-10 scale): 3-4/10 stiffness  Any medication changes, allergies to medications, adverse drug reactions, diagnosis change, or new procedure performed?: [x] No    [] Yes (see summary sheet for update)  Subjective functional status/changes:   [] No changes reported  Patient reports that her neck is about the same. Patient states that she felt better after last visit. OBJECTIVE    Modality rationale: decrease pain and increase tissue extensibility to improve the patients ability to to assist with performing ADL's and functional tasks without limitations.    Min Type Additional Details    [] Estim:  []Unatt       []IFC  []Premod                        []Other:  []w/ice   []w/heat  Position:  Location:    [] Estim: []Att    []TENS instruct  []NMES                    []Other:  []w/US   []w/ice   []w/heat  Position:  Location:    []  Traction: [] Cervical       []Lumbar                       [] Prone          []Supine                       []Intermittent   []Continuous Lbs:  [] before manual  [] after manual    []  Ultrasound: []Continuous   [] Pulsed                           []1MHz   []3MHz W/cm2:  Location:    []  Iontophoresis with dexamethasone         Location: [] Take home patch   [] In clinic   10 []  Ice     [x]  heat  []  Ice massage  []  Laser   []  Anodyne Position:supine  Location: C/S    []  Laser with stim  []  Other:  Position:  Location:    []  Vasopneumatic Device    []  Right     []  Left  Pre-treatment girth:  Post-treatment girth:  Measured at (location):  Pressure:       [] lo [] med [] hi   Temperature: [] lo [] med [] hi   [] Skin assessment post-treatment:  []intact []redness- no adverse reaction  []redness - adverse reaction:      26 min Therapeutic Exercise:  [x] See flow sheet :   Rationale: increase ROM and increase strength to improve the patients overall muscle endurance and activity tolerance for ADL's and functional tasks. 10 min Therapeutic Activity:  [x]  See flow sheet :   Rationale: increase strength and improve coordination  to improve the patients ability to safely perform dynamic activities and to improve functional performance of ADL's. 15 min Manual Therapy:  DTM and stretching to cervical musculature; manual cervical retractions   The manual therapy interventions were performed at a separate and distinct time from the therapeutic activities interventions. Rationale: decrease pain, increase ROM, increase tissue extensibility and decrease trigger points to assist with improving functional cervical ROM. With   [] TE   [] TA   [] neuro   [] other: Patient Education: [x] Review HEP    [] Progressed/Changed HEP based on:   [] positioning   [] body mechanics   [] transfers   [] heat/ice application    [] other:      Other Objective/Functional Measures: Added cervical SNAGS into extension and rotation. Muscle spasms present at (B) UT, levator scap and occipitals. Pain Level (0-10 scale) post treatment: 1/10 stiffness    ASSESSMENT/Changes in Function:   Patient is progressing well towards goals. Progressed exercises, as per flow sheet, to assist with increasing cervical strength and ROM. Patient responded well to today's session, as evident by, no increase in cervical pain and decreased stiffness and muscle spasms.    Patient will continue to benefit from skilled PT services to modify and progress therapeutic interventions, address functional mobility deficits, address ROM deficits, address strength deficits, analyze and address soft tissue restrictions, analyze and cue movement patterns, analyze and modify body mechanics/ergonomics, assess and modify postural abnormalities and instruct in home and community integration to attain remaining goals. []  See Plan of Care  []  See progress note/recertification  []  See Discharge Summary         Progress towards goals / Updated goals:  Short Term Goals: To be accomplished in 2 treatments:  1. Patient will be I with HEP in order to enhance carryover from PT sessions              Evaluation: HEP administered   Long Term Goals: To be accomplished in 10 treatments:  1. Patient will improve bilateral cervical rotation AROM to 50 deg in order to ease ADLs including driving              Evaluation: right rotation to 25 deg, left rotation to 22 deg  2. Patient will report a 50% improvement in headache symptoms and frequency in order to enhance quality of life              Evaluation: patient reporting that headaches occur 2x/month and last roughly 2 hours  3.  Patient will report an overall 75% improvement in symptoms to enhance quality of life and ADL participation              Evaluation: pain rated 4/10 at rest and 6/10 at worst       PLAN  [x]  Upgrade activities as tolerated     [x]  Continue plan of care  []  Update interventions per flow sheet       []  Discharge due to:_  []  Other:_      Prabhakar Echeverria PTA 11/1/2021  10:46 AM    Future Appointments   Date Time Provider Hedy Hilton   11/3/2021  9:45 AM Richmond Rausch PT [facility]   11/8/2021  9:45 AM Glory Canela PTA [facility]   11/10/2021 11:15 AM Jessika Kaiser PT [facility]   11/12/2021 11:15 AM Glory Canela PTA [facility]   11/22/2021 11:15 AM Glory Canela PTA [facility]   11/24/2021 11:15 AM Jessika Kaiser, PT [facility]   11/29/2021 11:15 AM Corina Sanchez, PT [facility]   12/1/2021 10:00 AM Ty Kaur MD [facility] BS AMB   3/9/2022  9:45 AM IO LAB VISIT [facility] BS AMB   3/16/2022 10:40 AM Ivelisse Cardoso MD [facility] BS AMB

## 2021-11-03 ENCOUNTER — HOSPITAL ENCOUNTER (OUTPATIENT)
Dept: PHYSICAL THERAPY | Age: 76
Discharge: HOME | End: 2021-11-03
Attending: PHYSICAL MEDICINE & REHABILITATION
Payer: MEDICARE

## 2021-11-03 PROCEDURE — 97110 THERAPEUTIC EXERCISES: CPT

## 2021-11-03 PROCEDURE — 97140 MANUAL THERAPY 1/> REGIONS: CPT

## 2021-11-03 PROCEDURE — 97530 THERAPEUTIC ACTIVITIES: CPT

## 2021-11-03 NOTE — PROGRESS NOTES
PT DAILY TREATMENT NOTE     Patient Name: Laya Gracia  Date:11/3/2021  : 1945  [x]  Patient  Verified  Payor: MEDICARE RAILROAD / Plan: [place] MED[place]ILROAD / Product Type: Medicare /    In time:949  Out time:1054  Total Treatment Time (min): 65  Visit #: 4 of 10    Medicare/BCBS Only   Total Timed Codes (min):  55 1:1 Treatment Time:  54       Treatment Area: Cervicalgia [M54.2]  Spondylosis without myelopathy or radiculopathy, cervical region [M47.812]  Other muscle spasm [M62.838]  Other cervical disc degeneration, unspecified cervical region [M50.30]    SUBJECTIVE  Pain Level (0-10 scale): 3-4  Any medication changes, allergies to medications, adverse drug reactions, diagnosis change, or new procedure performed?: [x] No    [] Yes (see summary sheet for update)  Subjective functional status/changes:   [] No changes reported  She states that \"It is mostly stiffness and with turning my head to the left\"    OBJECTIVE    Modality rationale: decrease pain and increase tissue extensibility to improve the patients ability to tolerate ADLs and activities   Min Type Additional Details    [] Estim:  []Unatt       []IFC  []Premod                        []Other:  []w/ice   []w/heat  Position:  Location:    [] Estim: []Att    []TENS instruct  []NMES                    []Other:  []w/US   []w/ice   []w/heat  Position:  Location:    []  Traction: [] Cervical       []Lumbar                       [] Prone          []Supine                       []Intermittent   []Continuous Lbs:  [] before manual  [] after manual    []  Ultrasound: []Continuous   [] Pulsed                           []1MHz   []3MHz W/cm2:  Location:    []  Iontophoresis with dexamethasone         Location: [] Take home patch   [] In clinic   10 []  Ice     [x]  heat  Post   []  Ice massage  []  Laser   []  Anodyne Position:supine  Location:Csp/UT    []  Laser with stim  []  Other:  Position:  Location:    []  Vasopneumatic Device    []  Right []  Left  Pre-treatment girth:  Post-treatment girth:  Measured at (location):  Pressure:       [] lo [] med [] hi   Temperature: [] lo [] med [] hi   [] Skin assessment post-treatment:  []intact []redness- no adverse reaction    []redness - adverse reaction:          25 min Therapeutic Exercise:  [x] See flow sheet :   Rationale: increase ROM, increase strength and improve coordination to improve the patients ability to tolerate ADLs and activities    10 min Therapeutic Activity:  [x]  See flow sheet :   Rationale: increase ROM, increase strength and improve coordination  to improve the patients ability to tolerate ADLs and activities      20 min Manual Therapy:    DTM and stretching to cervical musculature; manual cervical retractions, SOR, CTX, Csp ROM, TPR B UT all in supine   The manual therapy interventions were performed at a separate and distinct time from the therapeutic activities interventions. Rationale: decrease pain, increase ROM, increase tissue extensibility and decrease trigger points to tolerate ADLs and activities           With   [] TE   [] TA   [] neuro   [] other: Patient Education: [x] Review HEP    [] Progressed/Changed HEP based on:   [] positioning   [] body mechanics   [] transfers   [] heat/ice application    [] other:      Other Objective/Functional Measures: VC exercises and technique     Pain Level (0-10 scale) post treatment: 1    ASSESSMENT/Changes in Function: LOM observed with decrease ROM for right levator stretch. Residual STC left > right UT and sub occipital region. . improved ROM reported and observed post session with decrease stiffness reported.      Patient will continue to benefit from skilled PT services to modify and progress therapeutic interventions, address ROM deficits, address strength deficits, analyze and address soft tissue restrictions, analyze and cue movement patterns, analyze and modify body mechanics/ergonomics, assess and modify postural abnormalities and instruct in home and community integration to attain remaining goals. [x]  See Plan of Care  []  See progress note/recertification  []  See Discharge Summary         Progress towards goals / Updated goals:  Short Term Goals: To be accomplished in 2 treatments:  1. Patient will be I with HEP in order to enhance carryover from PT sessions              Evaluation: HEP administered    CURRENT reports compliance 11/3/21  Long Term Goals: To be accomplished in 10 treatments:  1. Patient will improve bilateral cervical rotation AROM to 50 deg in order to ease ADLs including driving              Evaluation: right rotation to 25 deg, left rotation to 22 deg   CURRENT ongoing NA 11/3/21  2. Patient will report a 50% improvement in headache symptoms and frequency in order to enhance quality of life              Evaluation: patient reporting that headaches occur 2x/month and last roughly 2 hours   CURRENT residual stiffness, no % given 11/3/21  3.  Patient will report an overall 75% improvement in symptoms to enhance quality of life and ADL participation              Evaluation: pain rated 4/10 at rest and 6/10 at worst   CURRENT ongoing NA 11/3/21          PLAN  [x]  Upgrade activities as tolerated     [x]  Continue plan of care  []  Update interventions per flow sheet       []  Discharge due to:_  []  Other:_      Ninoska Arndt, PT 11/3/2021  10:12 AM    Future Appointments   Date Time Provider Hedy Hilton   11/8/2021  9:45 AM Mehrdad Bound, PTA [facility]   11/10/2021 11:15 AM Katelyn Flores, PT [facility]   11/12/2021 11:15 AM Mehrdad Bound, [facility]   11/22/2021 11:15 AM Mehrdad Bound, P[facility]   11/24/2021 11:15 AM Katelyn Flores, PT [facility]   11/29/2021 11:15 AM Claudia Espino, PT [facility]   12/1/2021 10:00 AM Michelle Gonsalez MD [facility] BS AMB   3/9/2022  9:45 AM [facility] LAB VISIT [facility] BS AMB   3/16/2022 10:40 AM López Mistry MD [facility] AMB

## 2021-11-08 ENCOUNTER — HOSPITAL ENCOUNTER (OUTPATIENT)
Dept: PHYSICAL THERAPY | Age: 76
Discharge: HOME | End: 2021-11-08
Attending: PHYSICAL MEDICINE & REHABILITATION
Payer: MEDICARE

## 2021-11-08 PROCEDURE — 97530 THERAPEUTIC ACTIVITIES: CPT

## 2021-11-08 PROCEDURE — 97140 MANUAL THERAPY 1/> REGIONS: CPT

## 2021-11-08 PROCEDURE — 97110 THERAPEUTIC EXERCISES: CPT

## 2021-11-08 NOTE — PROGRESS NOTES
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 \"I feel good after leaving here but then it goes right back to being tight. \"     OBJECTIVE    Modality rationale: decrease pain and increase tissue extensibility to improve the patients ability to to assist with performing ADL's and functional tasks without limitations.    Min Type Additional Details    [] Estim:  []Unatt       []IFC  []Premod                        []Other:  []w/ice   []w/heat  Position:  Location:    [] Estim: []Att    []TENS instruct  []NMES                    []Other:  []w/US   []w/ice   []w/heat  Position:  Location:    []  Traction: [] Cervical       []Lumbar                       [] Prone          []Supine                       []Intermittent   []Continuous Lbs:  [] before manual  [] after manual    []  Ultrasound: []Continuous   [] Pulsed                           []1MHz   []3MHz W/cm2:  Location:    []  Iontophoresis with dexamethasone         Location: [] Take home patch   [] In clinic   10 []  Ice     [x]  heat  []  Ice massage  []  Laser   []  Anodyne Position:supine  Location: C/S    []  Laser with stim  []  Other:  Position:  Location:    []  Vasopneumatic Device    []  Right     []  Left  Pre-treatment girth:  Post-treatment girth:  Measured at (location):  Pressure:       [] lo [] med [] hi   Temperature: [] lo [] med [] hi   [] Skin assessment post-treatment:  []intact []redness- no adverse reaction  []redness - adverse reaction:      31 min Therapeutic Exercise:  [x] See flow sheet :   Rationale: increase ROM and increase strength to improve the patients overall muscle endurance and activity tolerance for ADL's and functional tasks. 10 min Therapeutic Activity:  [x]  See flow sheet :   Rationale: increase strength and improve coordination  to improve the patients ability to safely perform dynamic activities and to improve functional performance of ADL's. 15 min Manual Therapy:  DTM and stretching to cervical musculature; manual cervical retractions   The manual therapy interventions were performed at a separate and distinct time from the therapeutic activities interventions. Rationale: decrease pain, increase ROM, increase tissue extensibility and decrease trigger points to assist with improving functional cervical ROM. With   [x] TE   [] TA   [] neuro   [] other: Patient Education: [x] Review HEP    [] Progressed/Changed HEP based on: Issued and instructed on cervical retraction HEP. Patient instructed to perform 2x10 every hour. Patient verbalized understanding. [] positioning   [] body mechanics   [] transfers   [] heat/ice application    [] other:      Other Objective/Functional Measures: Added pec stretch and cervical retractions  Muscle spasms present at (B) UT, levator scap and occipitals. Pain Level (0-10 scale) post treatment: 0/10    ASSESSMENT/Changes in Function:   Patient is progressing well towards goals. Progressed exercises, as per flow sheet, to assist with increasing cervical ROM and thoracic mobility.  Patient's cervical ROM into left rotation improved after performing cervical retractions. Patient responded well to today's overall session, as evident by, decreased stiffness and improved cervical ROM. Patient will continue to benefit from skilled PT services to modify and progress therapeutic interventions, address functional mobility deficits, address ROM deficits, address strength deficits, analyze and address soft tissue restrictions, analyze and cue movement patterns, analyze and modify body mechanics/ergonomics, assess and modify postural abnormalities and instruct in home and community integration to attain remaining goals. []  See Plan of Care  []  See progress note/recertification  []  See Discharge Summary         Progress towards goals / Updated goals:  Short Term Goals: To be accomplished in 2 treatments:  1. Patient will be I with HEP in order to enhance carryover from PT sessions              Evaluation: HEP administered               CURRENT reports compliance 11/3/21  Long Term Goals: To be accomplished in 10 treatments:  1. Patient will improve bilateral cervical rotation AROM to 50 deg in order to ease ADLs including driving              Evaluation: right rotation to 25 deg, left rotation to 22 deg              CURRENT ongoing NA 11/8/21  2. Patient will report a 50% improvement in headache symptoms and frequency in order to enhance quality of life              Evaluation: patient reporting that headaches occur 2x/month and last roughly 2 hours              CURRENT residual stiffness, no % given 11/3/21  3.  Patient will report an overall 75% improvement in symptoms to enhance quality of life and ADL participation              Evaluation: pain rated 4/10 at rest and 6/10 at worst              CURRENT ongoing NA 11/8/21    PLAN  [x]  Upgrade activities as tolerated     [x]  Continue plan of care  []  Update interventions per flow sheet       []  Discharge due to:_  []  Other:_      Shon Hammond PTA 11/8/2021  10:46 AM    Future Appointments   Date Time Provider Hedy Hilton   11/10/2021 11:15 AM Kita Irby, PT [facility]   11/12/2021 11:15 AM Antonette Urias, PTA [facility]   11/22/2021 11:15 AM Antonette Urias, PTA [facility]   11/24/2021 11:15 AM Kita Irby, PT [facility]   11/29/2021 11:15 AM Kristie Hickman, PT [facility]   12/1/2021 10:00 AM Cliff Paulson MD [facility] BS AMB   3/9/2022  9:45 AM [facility] AM[facility]   3/16/2022 10:40 AM Sunday Cardoso MD [facility] AM[facility]

## 2021-11-10 ENCOUNTER — APPOINTMENT (OUTPATIENT)
Dept: PHYSICAL THERAPY | Age: 76
End: 2021-11-10
Attending: PHYSICAL MEDICINE & REHABILITATION
Payer: MEDICARE

## 2021-11-12 ENCOUNTER — HOSPITAL ENCOUNTER (OUTPATIENT)
Dept: PHYSICAL THERAPY | Age: 76
Discharge: HOME | End: 2021-11-12
Attending: PHYSICAL MEDICINE & REHABILITATION
Payer: MEDICARE

## 2021-11-12 PROCEDURE — 97110 THERAPEUTIC EXERCISES: CPT

## 2021-11-12 PROCEDURE — 97140 MANUAL THERAPY 1/> REGIONS: CPT

## 2021-11-12 PROCEDURE — 97530 THERAPEUTIC ACTIVITIES: CPT

## 2021-11-12 NOTE — PROGRESS NOTES
PT DAILY TREATMENT NOTE     Patient Name: Cecilia Jorgensen  Date:2021  : 1945  [x]  Patient  Verified  Payor: MEDICARE RAILROAD / Plan: [employer] / Product Type: Medicare /    In time: 11:17 am Out time: 12:17  Total Treatment Time (min): 60  Visit #: 6 of 10    Medicare/BCBS Only   Total Timed Codes (min):  50 1:1 Treatment Time:  50       Treatment Area: Cervicalgia [M54.2]  Spondylosis without myelopathy or radiculopathy, cervical region [M47.812]  Other muscle spasm [M62.838]  Other cervical disc degeneration, unspecified cervical region [M50.30]    SUBJECTIVE  Pain Level (0-10 scale): 0/10 stiffness  Any medication changes, allergies to medications, adverse drug reactions, diagnosis change, or new procedure performed?: [x] No    [] Yes (see summary sheet for update)  Subjective functional status/changes:   [] No changes reported  Patient reports that she had a headache for 3 days after last visit and she is not sure if it was from the exercises or night. \"I have been doing the neck turns more than any thing. \"     OBJECTIVE    Modality rationale: decrease pain and increase tissue extensibility to improve the patients ability to to assist with performing ADL's and functional tasks without limitations.    Min Type Additional Details    [] Estim:  []Unatt       []IFC  []Premod                        []Other:  []w/ice   []w/heat  Position:  Location:    [] Estim: []Att    []TENS instruct  []NMES                    []Other:  []w/US   []w/ice   []w/heat  Position:  Location:    []  Traction: [] Cervical       []Lumbar                       [] Prone          []Supine                       []Intermittent   []Continuous Lbs:  [] before manual  [] after manual    []  Ultrasound: []Continuous   [] Pulsed                           []1MHz   []3MHz W/cm2:  Location:    []  Iontophoresis with dexamethasone         Location: [] Take home patch   [] In clinic   10 []  Ice     [x]  heat  []  Ice massage  []  Laser   []  Anodyne Position:supine  Location: C/S    []  Laser with stim  []  Other:  Position:  Location:    []  Vasopneumatic Device    []  Right     []  Left  Pre-treatment girth:  Post-treatment girth:  Measured at (location):  Pressure:       [] lo [] med [] hi   Temperature: [] lo [] med [] hi   [] Skin assessment post-treatment:  []intact []redness- no adverse reaction  []redness - adverse reaction:      22 min Therapeutic Exercise:  [x] See flow sheet :   Rationale: increase ROM and increase strength to improve the patients overall muscle endurance and activity tolerance for ADL's and functional tasks. 13 min Therapeutic Activity:  [x]  See flow sheet :   Rationale: increase strength and improve coordination  to improve the patients ability to safely perform dynamic activities and to improve functional performance of ADL's. 15 min Manual Therapy:  DTM and stretching to cervical musculature; manual cervical retractions   The manual therapy interventions were performed at a separate and distinct time from the therapeutic activities interventions. Rationale: decrease pain, increase ROM, increase tissue extensibility and decrease trigger points to assist with improving functional cervical ROM. With   [x] TE   [] TA   [] neuro   [] other: Patient Education: [x] Review HEP    [] Progressed/Changed HEP based on: [] positioning   [] body mechanics   [] transfers   [] heat/ice application    [] other:      Other Objective/Functional Measures: Added hugs on 1/2 foam   Muscle spasms present at (B) UT, levator scap and occipitals. Tenderness also present occipital area    Pain Level (0-10 scale) post treatment: 0/10    ASSESSMENT/Changes in Function:   Patient is progressing well towards goals. Progressed exercises, as per flow sheet, to assist with increasing cervical ROM and thoracic mobility. Increased pain and decreased ROM still present with left rotation and side bending. Muscle spasms decreased following today's manual therapy. Patient will continue to benefit from skilled PT services to modify and progress therapeutic interventions, address functional mobility deficits, address ROM deficits, address strength deficits, analyze and address soft tissue restrictions, analyze and cue movement patterns, analyze and modify body mechanics/ergonomics, assess and modify postural abnormalities and instruct in home and community integration to attain remaining goals. []  See Plan of Care  []  See progress note/recertification  []  See Discharge Summary         Progress towards goals / Updated goals:  Short Term Goals: To be accomplished in 2 treatments:  1. Patient will be I with HEP in order to enhance carryover from PT sessions              Evaluation: HEP administered               CURRENT reports compliance 11/12/21  Long Term Goals: To be accomplished in 10 treatments:  1. Patient will improve bilateral cervical rotation AROM to 50 deg in order to ease ADLs including driving              Evaluation: right rotation to 25 deg, left rotation to 22 deg              CURRENT ongoing NA 11/12/21  2. Patient will report a 50% improvement in headache symptoms and frequency in order to enhance quality of life              Evaluation: patient reporting that headaches occur 2x/month and last roughly 2 hours              CURRENT- Patient experienced a headache that last for 3 days 11/12/21  3.  Patient will report an overall 75% improvement in symptoms to enhance quality of life and ADL participation              Evaluation: pain rated 4/10 at rest and 6/10 at worst              CURRENT ongoing NA 11/12/21    PLAN  [x]  Upgrade activities as tolerated     [x]  Continue plan of care  []  Update interventions per flow sheet       []  Discharge due to:_  []  Other:_      Edyth Stage, PTA 11/12/2021  10:46 AM    Future Appointments   Date Time Provider Hedy Hilton   11/22/2021 11:15 AM Mily Chery, PTA [facility]   11/24/2021 11:15 AM Jeremiah Drake, PT [facility]   11/29/2021 11:15 AM Angel Langley, PT [facility]   12/1/2021 10:00 AM Maddy Wagner MD [facility] BS AMB   3/9/2022  9:45 AM [facility] AMB   3/16/2022 10:40 AM Patricia Cardoso MD [facility] AMB

## 2021-11-15 RX ORDER — ATORVASTATIN CALCIUM 10 MG/1
TABLET, FILM COATED ORAL
Qty: 90 TABLET | Refills: 3 | Status: SHIPPED | OUTPATIENT
Start: 2021-11-15

## 2021-11-22 ENCOUNTER — HOSPITAL ENCOUNTER (OUTPATIENT)
Dept: PHYSICAL THERAPY | Age: 76
Discharge: HOME | End: 2021-11-22
Attending: PHYSICAL MEDICINE & REHABILITATION
Payer: MEDICARE

## 2021-11-22 PROCEDURE — 97110 THERAPEUTIC EXERCISES: CPT

## 2021-11-22 PROCEDURE — 97530 THERAPEUTIC ACTIVITIES: CPT

## 2021-11-22 PROCEDURE — 97140 MANUAL THERAPY 1/> REGIONS: CPT

## 2021-11-22 NOTE — PROGRESS NOTES
PT DAILY TREATMENT NOTE     Patient Name: Sameer Abdul  Date:2021  : 1945  [x]  Patient  Verified  Payor: [place] MED[place] / Plan: [place] MEDICARE PART A & B / Product Type: Medicare /    In time: 11:25 am Out time: 12:15 pm  Total Treatment Time (min): 50  Visit #: 1 of 8    Medicare/BCBS Only   Total Timed Codes (min):  40 1:1 Treatment Time:  40       Treatment Area: Cervicalgia [M54.2]  Spondylosis without myelopathy or radiculopathy, cervical region [M47.812]  Other muscle spasm [M62.838]  Other cervical disc degeneration, unspecified cervical region [M50.30]    SUBJECTIVE  Pain Level (0-10 scale): 0/10 stiffness  Any medication changes, allergies to medications, adverse drug reactions, diagnosis change, or new procedure performed?: [x] No    [] Yes (see summary sheet for update)  Subjective functional status/changes:   [] No changes reported  Patient reports that she has had a headache since last visit. \"It's not very bad. It's just there. \"     OBJECTIVE    Modality rationale: decrease pain and increase tissue extensibility to improve the patients ability to to assist with performing ADL's and functional tasks without limitations.    Min Type Additional Details    [] Estim:  []Unatt       []IFC  []Premod                        []Other:  []w/ice   []w/heat  Position:  Location:    [] Estim: []Att    []TENS instruct  []NMES                    []Other:  []w/US   []w/ice   []w/heat  Position:  Location:    []  Traction: [] Cervical       []Lumbar                       [] Prone          []Supine                       []Intermittent   []Continuous Lbs:  [] before manual  [] after manual    []  Ultrasound: []Continuous   [] Pulsed                           []1MHz   []3MHz W/cm2:  Location:    []  Iontophoresis with dexamethasone         Location: [] Take home patch   [] In clinic   10 []  Ice     [x]  heat  []  Ice massage  []  Laser   []  Anodyne Position:seated  Location: C/S    []  Laser with stim  []  Other:  Position:  Location:    []  Vasopneumatic Device    []  Right     []  Left  Pre-treatment girth:  Post-treatment girth:  Measured at (location):  Pressure:       [] lo [] med [] hi   Temperature: [] lo [] med [] hi   [] Skin assessment post-treatment:  []intact []redness- no adverse reaction  []redness - adverse reaction:      10 min Therapeutic Exercise:  [x] See flow sheet :   Rationale: increase ROM and increase strength to improve the patients overall muscle endurance and activity tolerance for ADL's and functional tasks. 18 min Therapeutic Activity:  [x]  See flow sheet :   Rationale: increase strength and improve coordination  to improve the patients ability to safely perform dynamic activities and to improve functional performance of ADL's.      12 min Manual Therapy:  DTM and stretching to cervical musculature; manual cervical retractions   The manual therapy interventions were performed at a separate and distinct time from the therapeutic activities interventions. Rationale: decrease pain, increase ROM, increase tissue extensibility and decrease trigger points to assist with improving functional cervical ROM. With   [x] TE   [] TA   [] neuro   [] other: Patient Education: [x] Review HEP    [] Progressed/Changed HEP based on: [] positioning   [] body mechanics   [] transfers   [] heat/ice application    [] other:      Other Objective/Functional Measures:    FOTO Assessment score: 96 points   Muscle spasms present at (B) UT, levator scap and occipitals. Pain Level (0-10 scale) post treatment: 0/10    ASSESSMENT/Changes in Function:   Patient has attended 7 PT visits to date. Patient reports 50% improvement since the start of PT. Patient's cervical rotation ROM has improved but patient still experiences headaches every day. Patient's FOTO score is unchanged since the initial evaluation.    Patient will continue to benefit from skilled PT services to modify and progress therapeutic interventions, address functional mobility deficits, address ROM deficits, address strength deficits, analyze and address soft tissue restrictions, analyze and cue movement patterns, analyze and modify body mechanics/ergonomics, assess and modify postural abnormalities and instruct in home and community integration to attain remaining goals. []  See Plan of Care  [x]  See progress note/recertification  []  See Discharge Summary         Progress towards goals / Updated goals:  Short Term Goals: To be accomplished in 2 treatments:  1. Patient will be I with HEP in order to enhance carryover from PT sessions              Evaluation: HEP administered               CURRENT reports compliance 11/22/2. MET   Long Term Goals: To be accomplished in 10 treatments:  1. Patient will improve bilateral cervical rotation AROM to 50 deg in order to ease ADLs including driving              Evaluation: right rotation to 25 deg, left rotation to 22 deg              CURRENT- Right rotation 70 deg; left rotation 55 deg. 11/22/21 MET  2. Patient will report a 50% improvement in headache symptoms and frequency in order to enhance quality of life              Evaluation: patient reporting that headaches occur 2x/month and last roughly 2 hours              CURRENT- Patient reports having headaches daily that do not seem to go away. (11/22/21) NOT MET   3.  Patient will report an overall 75% improvement in symptoms to enhance quality of life and ADL participation              Evaluation: pain rated 4/10 at rest and 6/10 at worst              CURRENT- Patient reports 50% improvement in symptoms since starting PT. (11/22/2021) NOT MET     PLAN  [x]  Upgrade activities as tolerated     [x]  Continue plan of care  []  Update interventions per flow sheet       []  Discharge due to:_  []  Other:_      Ani Villalba PTA 11/22/2021  10:46 AM    Future Appointments   Date Time Provider Hedy Hilton   11/24/2021 11:15 AM Dexter Bailey Burks, PT [facility]   11/29/2021 11:15 AM Corina Sanchez, PT [facility]   12/1/2021 10:00 AM Ty Kaur MD [facility] BS AMB   3/9/2022  9:45 AM [facility] LAB VISIT [facility] BS AMB   3/16/2022 10:40 AM Ivelisse Cardoso MD [facility] BS AMB

## 2021-11-22 NOTE — PROGRESS NOTES
[facility] [address], [address] [place] [address], [address] [place], [place] [address]y [address]  [phone] fax      Continued Plan of Care/ Re-certification for Physical Therapy Services    Patient name: Alexsandra Lei Start of Care: 10/20/21   Referral source: Shahbaz Quintana MD : 1945   Medical/Treatment Diagnosis: Cervicalgia [M54.2]  Spondylosis without myelopathy or radiculopathy, cervical region [M47.812]  Other muscle spasm [M62.838]  Other cervical disc degeneration, unspecified cervical region [M50.30]  Payor: [place] MEDICARE / Plan: VA MEDICARE PART A & B / Product Type: Medicare /  Onset Date:> 3 years ago with recent worsening                 Prior Hospitalization: see medical history Provider#: [id number]   Medications: Verified on Patient Summary List     Comorbidities: Patient reports osteoporosis, arthritis, and high BP   Prior Level of Function: Patient is I with ADLs and ambulates without use of an AD. Patient mainly stays busy with housework and gardening. Visits from Start of Care: 7    Missed Visits: 0    The Plan of Care and following information is based on the patient's current status:  Short Term Goals: To be accomplished in 2 treatments:  1. Patient will be I with HEP in order to enhance carryover from PT sessions              Evaluation: HEP administered               CURRENT reports compliance . MET   Long Term Goals: To be accomplished in 10 treatments:  1. Patient will improve bilateral cervical rotation AROM to 50 deg in order to ease ADLs including driving              Evaluation: right rotation to 25 deg, left rotation to 22 deg              CURRENT- Right rotation 70 deg; left rotation 55 deg. 21 MET  2.  Patient will report a 50% improvement in headache symptoms and frequency in order to enhance quality of life              Evaluation: patient reporting that headaches occur 2x/month and last roughly 2 hours              CURRENT- Patient reports having headaches daily that do not seem to go away. (11/22/21) NOT MET   3. Patient will report an overall 75% improvement in symptoms to enhance quality of life and ADL participation              Evaluation: pain rated 4/10 at rest and 6/10 at worst              CURRENT- Patient reports 50% improvement in symptoms since starting PT. (11/22/2021) NOT MET        Key functional changes: slow progress with decrease pain      Problems/ barriers to goal attainment: residual pain, headaches     Problem List: pain affecting function, decrease ROM, decrease strength, decrease ADL/ functional abilitiies, decrease activity tolerance and decrease flexibility/ joint mobility FOTO 96    Treatment Plan: Therapeutic exercise, Therapeutic activities, Neuromuscular re-education, Physical agent/modality, Manual therapy, Patient education, Self Care training and Home safety training     Patient Goal (s) has been updated and includes: less pain     Goals for this certification period to be accomplished in 4 weeks:  1 . Patient will report a 50% improvement in headache symptoms and frequency in order to enhance quality of life             PN  Patient reports having headaches daily that do not seem to go away. (11/22/21) NOT MET   2. Patient will report an overall 75% improvement in symptoms to enhance quality of life and ADL participation  [phone] - Patient reports 50% improvement in symptoms since starting PT. (11/22/2021) NOT MET   3 patient will have pain improved to 1-2/10 to aid with increased tolerance to her daily activities with less headaches   PN  3-4       Frequency / Duration: Patient to be seen 2 times per week for 4 weeks:    Assessment / Recommendations:Patient has attended 7 PT visits to date. Patient reports 50% improvement since the start of PT. Patient's cervical rotation ROM has improved but patient still experiences headaches every day.  Patient's FOTO score is unchanged since the initial evaluation. Patient will continue to benefit from skilled PT services to modify and progress therapeutic interventions, address functional mobility deficits, address ROM deficits, address strength deficits, analyze and address soft tissue restrictions, analyze and cue movement patterns, analyze and modify body mechanics/ergonomics, assess and modify postural abnormalities and instruct in home and community integration to attain remaining goals. Certification Period: 11/22/21 - 12/21/21    Ninoska Chasshiva, PT 11/22/2021 12:29 PM    ________________________________________________________________________  I certify that the above Therapy Services are being furnished while the patient is under my care. I agree with the treatment plan and certify that this therapy is necessary. [] I have read the above and request that my patient continue as recommended.   [] I have read the above report and request that my patient continue therapy with the following changes/special instructions: _____________________________________________  [] I have read the above report and request that my patient be discharged from therapy    Physician's Signature:____________Date:_________TIME:________     Michelle Gonsalez MD  ** Signature, Date and Time must be completed for valid certification **    Please sign and return to In [address] [place] [address], [address] [place] [address], [address] [place] [address] 434,[address] [phone] fax

## 2021-11-24 ENCOUNTER — APPOINTMENT (OUTPATIENT)
Dept: PHYSICAL THERAPY | Age: 76
End: 2021-11-24
Attending: PHYSICAL MEDICINE & REHABILITATION
Payer: MEDICARE

## 2021-11-29 ENCOUNTER — APPOINTMENT (OUTPATIENT)
Dept: PHYSICAL THERAPY | Age: 76
End: 2021-11-29
Attending: PHYSICAL MEDICINE & REHABILITATION
Payer: MEDICARE

## 2021-12-01 ENCOUNTER — OFFICE VISIT (OUTPATIENT)
Dept: ORTHOPEDIC SURGERY | Age: 76
End: 2021-12-01
Payer: MEDICARE

## 2021-12-01 VITALS
HEART RATE: 71 BPM | TEMPERATURE: 94.4 F | HEIGHT: 61 IN | SYSTOLIC BLOOD PRESSURE: 111 MMHG | DIASTOLIC BLOOD PRESSURE: 51 MMHG | OXYGEN SATURATION: 10 % | RESPIRATION RATE: 16 BRPM | BODY MASS INDEX: 21.52 KG/M2 | WEIGHT: 114 LBS

## 2021-12-01 DIAGNOSIS — M47.812 CERVICAL FACET JOINT SYNDROME: Primary | ICD-10-CM

## 2021-12-01 DIAGNOSIS — M62.838 MUSCLE SPASM: ICD-10-CM

## 2021-12-01 DIAGNOSIS — M50.30 DDD (DEGENERATIVE DISC DISEASE), CERVICAL: ICD-10-CM

## 2021-12-01 PROCEDURE — G8432 DEP SCR NOT DOC, RNG: HCPCS | Performed by: PHYSICAL MEDICINE & REHABILITATION

## 2021-12-01 PROCEDURE — G8420 CALC BMI NORM PARAMETERS: HCPCS | Performed by: PHYSICAL MEDICINE & REHABILITATION

## 2021-12-01 PROCEDURE — 1101F PT FALLS ASSESS-DOCD LE1/YR: CPT | Performed by: PHYSICAL MEDICINE & REHABILITATION

## 2021-12-01 PROCEDURE — G8427 DOCREV CUR MEDS BY ELIG CLIN: HCPCS | Performed by: PHYSICAL MEDICINE & REHABILITATION

## 2021-12-01 PROCEDURE — G8752 SYS BP LESS 140: HCPCS | Performed by: PHYSICAL MEDICINE & REHABILITATION

## 2021-12-01 PROCEDURE — G8536 NO DOC ELDER MAL SCRN: HCPCS | Performed by: PHYSICAL MEDICINE & REHABILITATION

## 2021-12-01 PROCEDURE — G8754 DIAS BP LESS 90: HCPCS | Performed by: PHYSICAL MEDICINE & REHABILITATION

## 2021-12-01 PROCEDURE — 1090F PRES/ABSN URINE INCON ASSESS: CPT | Performed by: PHYSICAL MEDICINE & REHABILITATION

## 2021-12-01 PROCEDURE — G8399 PT W/DXA RESULTS DOCUMENT: HCPCS | Performed by: PHYSICAL MEDICINE & REHABILITATION

## 2021-12-01 PROCEDURE — 99213 OFFICE O/P EST LOW 20 MIN: CPT | Performed by: PHYSICAL MEDICINE & REHABILITATION

## 2021-12-01 NOTE — PATIENT INSTRUCTIONS
Neck Arthritis: Exercises  Introduction  Here are some examples of exercises for you to try. The exercises may be suggested for a condition or for rehabilitation. Start each exercise slowly. Ease off the exercises if you start to have pain. You will be told when to start these exercises and which ones will work best for you. How to do the exercises  Neck stretches to the side    1. This stretch works best if you keep your shoulder down as you lean away from it. To help you remember to do this, start by relaxing your shoulders and lightly holding on to your thighs or your chair. 2. Tilt your head toward your shoulder and hold for 15 to 30 seconds. Let the weight of your head stretch your muscles. 3. Repeat 2 to 4 times toward each shoulder. Chin tuck    1. Lie on the floor with a rolled-up towel under your neck. Your head should be touching the floor. 2. Slowly bring your chin toward your chest.  3. Hold for a count of 6, and then relax for up to 10 seconds. 4. Repeat 8 to 12 times. Active cervical rotation    1. Sit in a firm chair, or stand up straight. 2. Keeping your chin level, turn your head to the right, and hold for 15 to 30 seconds. 3. Turn your head to the left and hold for 15 to 30 seconds. 4. Repeat 2 to 4 times to each side. Shoulder blade squeeze    1. While standing, squeeze your shoulder blades together. 2. Do not raise your shoulders up as you are squeezing. 3. Hold for 6 seconds. 4. Repeat 8 to 12 times. Shoulder rolls    1. Sit comfortably with your feet shoulder-width apart. You can also do this exercise standing up. 2. Roll your shoulders up, then back, and then down in a smooth, circular motion. 3. Repeat 2 to 4 times. Follow-up care is a key part of your treatment and safety. Be sure to make and go to all appointments, and call your doctor if you are having problems. It's also a good idea to know your test results and keep a list of the medicines you take.   Where can you learn more? Go to http://www.gray.com/  Enter Z405 in the search box to learn more about \"Neck Arthritis: Exercises. \"  Current as of: July 1, 2021               Content Version: 13.0  © 8231-8780 Healthwise, [employer]. Care instructions adapted under license by [employer] (which disclaims liability or warranty for this information). If you have questions about a medical condition or this instruction, always ask your healthcare professional. [address] any warranty or liability for your use of this information.

## 2021-12-01 NOTE — PROGRESS NOTES
Chief Complaint   Patient presents with    Follow-up     med review       Pt preferred language for health care discussion is english.     Is someone accompanying this pt? no    Is the patient using any DME equipment during OV? no    Depression Screening:  3 most recent [facility] Screens 9/16/2021 3/11/2021 11/5/2020 4/29/2019 11/15/2018 8/24/2018 3/30/2018   Little interest or pleasure in doing things Not at all Not at all Not at all Not at all Not at all Not at all Not at all   Feeling down, depressed, irritable, or hopeless Not at all Not at all Not at all Nearly every day Not at all Not at all Not at all   Total Score PHQ 2 0 0 0 3 0 0 0   Trouble falling or staying asleep, or sleeping too much - - - Not at all - - -   Feeling tired or having little energy - - - Several days - - -   Poor appetite, weight loss, or overeating - - - Nearly every day - - -   Feeling bad about yourself - or that you are a failure or have let yourself or your family down - - - Not at all - - -   Trouble concentrating on things such as school, work, reading, or watching TV - - - Several days - - -   Moving or speaking so slowly that other people could have noticed; or the opposite being so fidgety that others notice - - - Not at all - - -   Thoughts of being better off dead, or hurting yourself in some way - - - Not at all - - -   PHQ 9 Score - - - 8 - - -   How difficult have these problems made it for you to do your work, take care of your home and get along with others - - - Somewhat difficult - - -       Learning Assessment:  Learning Assessment 4/29/2019 9/23/2014 4/2/2014 3/27/2014 10/31/2013   PRIMARY LEARNER Patient Patient Patient Patient Patient   HIGHEST LEVEL OF EDUCATION - PRIMARY LEARNER  GRADUATED HIGH SCHOOL OR GED - GRADUATED HIGH SCHOOL OR GED - -   BARRIERS PRIMARY LEARNER NONE - - - -   CO-LEARNER CAREGIVER No - - - -   PRIMARY LANGUAGE ENGLISH ENGLISH ENGLISH ENGLISH ENGLISH   LEARNER PREFERENCE PRIMARY DEMONSTRATION DEMONSTRATION OTHER (COMMENT) READING LISTENING     - LISTENING - LISTENING -     - - - DEMONSTRATION -   ANSWERED BY patient patient patient patient patient   RELATIONSHIP SELF SELF SELF SELF SELF       Abuse Screening:  Abuse Screening Questionnaire 9/16/2021 3/11/2021 11/5/2020 4/29/2019 9/23/2014   Do you ever feel afraid of your partner? N N N N N   Are you in a relationship with someone who physically or mentally threatens you? N N N N N   Is it safe for you to go home? Amber GORE       Fall Risk  Fall Risk Assessment, last 12 mths 9/16/2021   Able to walk? Yes   Fall in past 12 months? 0   Do you feel unsteady? 0   Are you worried about falling 0           Advance Directive:  1. Do you have an advance directive in place? Patient Reply:no    2. If not, would you like material regarding how to put one in place? Patient Reply: no    2. Per patient no changes to their ACP contact no. Coordination of Care:  1. Have you been to the ER, urgent care clinic since your last visit? Hospitalized since your last visit? no    2. Have you seen or consulted any other health care providers outside of the [address] [place] [address] since your last visit? Include any pap smears or colon screening.  no

## 2021-12-01 NOTE — PROGRESS NOTES
[facility]  [address] [place], [facility] [address]  Phone: [phone]  Fax: [phone]    Pt's YOB: 1945    ASSESSMENT   Diagnoses and all orders for this visit:    1. Cervical facet joint syndrome    2. Muscle spasm    3. DDD (degenerative disc disease), cervical         IMPRESSION AND PLAN:  Nora Jones is a 68 y.o. female with history of cervical pain. She complains of continued cervical stiffness with turning her head to the left but notes some improvement and reports no headaches since her last office visit. Pt takes Tylenol daily as needed for pain with relief. 1) Pt was given information on cervical arthritis exercises. 2) Discussed treatment options with the patient including steroid injections and radiofrequency ablation. 3) Pt was advised to discuss vitamin D3 supplementation with PCP    4) I recommended the pt continue to take Tylenol for pain management. Discussed safety of taking Tylenol daily. Mentioned taking NSAID's rarely as needed for severe pain. 5) I recommended massage therapy every 1-2 months and that the pt use ergonomic devices and a TheraCane or percussion massager for relief of cervical and upper trapezius stiffness. 6) Pt was given information on how to use a TheraCane. 7) Ms. Campos Pantoja has a reminder for a \"due or due soon\" health maintenance. I have asked that she contact her primary care provider, Ines Yung MD, for follow-up on this health maintenance. 8)  demonstrated consistency with prescribing. Follow-up and Dispositions    · Return if symptoms worsen or fail to improve. HISTORY OF PRESENT ILLNESS:  Nora Jones is a 68 y.o. female with history of cervical pain and presents to the office today for follow up.  Pt complains of continued cervical stiffness with turning her head to the left but notes some improvement and reports no headaches since her last office visit.  She notes that she has completed physical therapy at [facility] with benefit from deep tissue massage and has an inconsistent HEP of exercises from physical therapy. Pt takes Tylenol daily as needed for pain with relief. She denies medication allergies or any contraindications for NSAID's. She also notes that she spends large amounts of time looking down when reading or using an iPad. Pt at this time desires to continue with current care.     Pain Scale: 2/10    PCP: Pradip Clayton MD     Past Medical History:   Diagnosis Date    Allergic rhinitis     Anemia     lifelong    Anxiety     Carotid duplex study     BICA<50% (2/11); BICA<50% (9/14)    Cervical spondylosis     Chronic constipation     Colon cancer ([facility] [address] 1989    s/p partial colon resection    Colon polyp     Dr Mireya Conn 9/6/19    FHx: heart disease     GERD (gastroesophageal reflux disease)     H/O cardiovascular stress test 01/29/2009    neg, EF 78%, no wma     Hyperlipidemia     Hypertension     Lumbar spinal stenosis     s/p decomp Dr Abdon Cameron Microscopic hematuria     neg cysto Dr Rogers Cheatham 2013    Osteopenia     DEXA t score -0.6 spine, -1.9 hip (1/12); -0.6 spine, -1.8 hip (5/14); -1.9 spine, -2.2 hpi FRAX 12/2.8 (11/20)    Primary insomnia 11/1/2016    Vascular abnormality     RABI 1.21, LABI 1.22 (2.15)        Social History     Socioeconomic History    Marital status:      Spouse name: Not on file    Number of children: 2    Years of education: Not on file    Highest education level: Not on file   Occupational History    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   Tobacco Use    Smoking status: Never Smoker    Smokeless tobacco: Never Used   Substance and Sexual Activity    Alcohol use: Yes     Comment: socially    Drug use: No    Sexual activity: Not on file   Other Topics Concern    Not on file   Social History Narrative    Not on file     Social Determinants of Health     Financial Resource Strain:     Difficulty of Paying Living Expenses: Not on file   Food Insecurity:     Worried About Running Out of Food in the Last Year: Not on file    [place] of Food in the Last Year: Not on file   Transportation Needs:     Lack of Transportation (Medical): Not on file    Lack of Transportation (Non-Medical): Not on file   Physical Activity:     Days of Exercise per Week: Not on file    Minutes of Exercise per Session: Not on file   Stress:     Feeling of Stress : Not on file   Social Connections:     Frequency of Communication with Friends and Family: Not on file    Frequency of Social Gatherings with Friends and Family: Not on file    Attends Yazidi Services: Not on file    Active Member of [address] [place] [address] or Organizations: Not on file    Attends Club or Organization Meetings: Not on file    Marital Status: Not on file   Intimate Partner Violence:     Fear of Current or Ex-Partner: Not on file    Emotionally Abused: Not on file    Physically Abused: Not on file    Sexually Abused: Not on file   Housing Stability:     Unable to Pay for Housing in the Last Year: Not on file    Number of [place] in the Last Year: Not on file    Unstable Housing in the Last Year: Not on file       Current Outpatient Medications   Medication Sig Dispense Refill    atorvastatin (LIPITOR) 10 mg tablet TAKE 1 TABLET EVERY DAY 90 Tablet 3    ammonium lactate (LAC-HYDRIN) 12 % lotion       chlorthalidone (HYGROTON) 25 mg tablet chlorthalidone 25 mg tablet      ALPRAZolam (Xanax) 0.25 mg tablet Take 1 Tablet by mouth three (3) times daily as needed for Anxiety. Max Daily Amount: 0.75 mg. 60 Tablet 0    lisinopriL (PRINIVIL, ZESTRIL) 40 mg tablet Take 1 Tablet by mouth daily. (Patient taking differently: Take 40 mg by mouth daily. Takes half tablet daily (20 mg)) 90 Tablet 3    ipratropium (ATROVENT) 21 mcg (0.03 %) nasal spray 2 Sprays by Both Nostrils route every twelve (12) hours.  60 mL 3    hydroCHLOROthiazide (HYDRODIURIL) 12.5 mg tablet TAKE 1 TABLET EVERY DAY 90 Tab 3    polyethylene glycol (MIRALAX) 17 gram/dose powder Take 17 g by mouth daily. (Patient taking differently: Take 17 g by mouth daily as needed.) 1530 g 3    aspirin delayed-release 81 mg tablet Take 81 mg by mouth daily.  MULTIVITS,CA,MINERALS/IRON/FA (MULTI FOR HER PO) Take 1 Tab by mouth daily.  calcium-cholecalciferol, d3, (CALCIUM 600 + D) 600-125 mg-unit Tab Take 1 Tab by mouth daily.  doxycycline (VIBRAMYCIN) 100 mg capsule doxycycline hyclate 100 mg capsule (Patient not taking: Reported on 12/1/2021)      omega-3 fatty acids-vitamin e (FISH OIL) 1,000 mg Cap Take 1 Cap by mouth daily. (Patient not taking: Reported on 12/1/2021)         No Known Allergies      REVIEW OF SYSTEMS    Constitutional: Negative for fever, chills, or weight change. Respiratory: Negative for cough or shortness of breath. Cardiovascular: Negative for chest pain or palpitations. Gastrointestinal: Negative for acid reflux, change in bowel habits, or constipation. Genitourinary: Negative for dysuria and flank pain. Musculoskeletal: Positive for cervical pain. Skin: Negative for rash. Neurological: Negative for headaches, dizziness, or numbness. Endo/Heme/Allergies: Negative for increased bruising. Psychiatric/Behavioral: Negative for difficulty with sleep. As per HPI    PHYSICAL EXAMINATION  Visit Vitals  BP (!) 111/51 (BP 1 Location: Left upper arm, BP Patient Position: Sitting, BP Cuff Size: Adult)   Pulse 71   Temp (!) 94.4 °F (34.7 °C) (Tympanic)   Resp 16   Ht 5' 1\" (1.549 m)   Wt 114 lb (51.7 kg)   SpO2 (!) 10%   BMI 21.54 kg/m²       Constitutional: Awake, alert, and in no acute distress. Neurological:  Sensation to light touch is intact. Skin: warm, dry, and intact. Musculoskeletal: Tightness with cervical extension and forward flexion. Limited range of motion in the cervical spine with rotation, L>R. Tightness across the upper trapezius. No tenderness to palpation over the upper trapezius. No pain with extension, axial loading, or forward flexion. No pain with internal or external rotation of her hips. Negative straight leg raise bilaterally. Biceps  Triceps Deltoids Wrist Ext Wrist Flex Hand Intrin   Right +4/5 +4/5 +4/5 +4/5 +4/5 +4/5   Left +4/5 +4/5 +4/5 +4/5 +4/5 +4/5     IMAGING:    Cervical spine 2V x-rays from 10/11/2021 were personally reviewed with the patient and demonstrated:  Diffuse degenerative changes. Written by Florian Dee, as dictated by Veronika Anderson MD.  I, Dr. Veronika Anderson confirm that all documentation is accurate.

## 2021-12-30 NOTE — PROGRESS NOTES
[facility] [address], [address] [place] [address], [address] [place] [address],[facility] [address]  [phone] fax      Discharge Summary    Patient name: Clayton Castleman     Start of Care: 10/20/21  Referral source: Vasile Guzmán MD    : 1945  Medical/Treatment Diagnosis: Cervicalgia [M54.2]  Spondylosis without myelopathy or radiculopathy, cervical region [M47.812]  Other muscle spasm [M62.838]  Other cervical disc degeneration, unspecified cervical region [M50.30]  Payor: VA MEDICARE / Plan: VA MEDICARE PART A & B / Product Type: Medicare /        Onset Date:3 years ago with recent worsening             Prior Hospitalization: see medical history   Provider#: [id number]  Comorbidities: Patient reports osteoporosis, arthritis, and high BP  Prior Level of Function: Patient is I with ADLs and ambulates without use of an AD. Patient mainly stays busy with housework and gardening.          Medications: Verified on Patient Summary List    Visits from Start of Care: 7 Missed Visits: 0  Reporting Period : 10/20/21 to 2021      Summary of Care:  Goal:Patient will report a 50% improvement in headache symptoms and frequency in order to enhance quality of life  Status at last note/certification:Patient reports having headaches daily that do not seem to go away. (21) NOT MET   Status at discharge: N/A    Goal:Patient will report an overall 75% improvement in symptoms to enhance quality of life and ADL participation  Status at last note/certification:Patient reports 50% improvement in symptoms since starting PT. (2021) NOT MET   Status at discharge: Red Shen will have pain improved to 1-2/10 to aid with increased tolerance to her daily activities with less headaches  Status at last note/certification: 3-4   Status at discharge: N/A      ASSESSMENT/RECOMMENDATIONS:  []Discontinue therapy progressing towards or have reached established goals  [x]Discontinue therapy due to lack of appreciable progress towards goals  [x]Discontinue therapy due to lack of attendance or compliance  []Other:     Thank you for this referral.     Marli Cheatham, CLEMENT 12/30/2021 11:06 AM

## 2022-01-24 ENCOUNTER — OFFICE VISIT (OUTPATIENT)
Dept: INTERNAL MEDICINE CLINIC | Age: 77
End: 2022-01-24
Payer: MEDICARE

## 2022-01-24 VITALS
HEART RATE: 60 BPM | SYSTOLIC BLOOD PRESSURE: 118 MMHG | WEIGHT: 113 LBS | DIASTOLIC BLOOD PRESSURE: 57 MMHG | RESPIRATION RATE: 12 BRPM | TEMPERATURE: 97.5 F | BODY MASS INDEX: 21.34 KG/M2 | HEIGHT: 61 IN | OXYGEN SATURATION: 100 %

## 2022-01-24 DIAGNOSIS — D64.9 CHRONIC ANEMIA: ICD-10-CM

## 2022-01-24 DIAGNOSIS — I10 PRIMARY HYPERTENSION: ICD-10-CM

## 2022-01-24 DIAGNOSIS — Z00.00 MEDICARE ANNUAL WELLNESS VISIT, SUBSEQUENT: Primary | ICD-10-CM

## 2022-01-24 DIAGNOSIS — E78.5 HYPERLIPIDEMIA, UNSPECIFIED HYPERLIPIDEMIA TYPE: ICD-10-CM

## 2022-01-24 DIAGNOSIS — F41.9 ANXIETY: ICD-10-CM

## 2022-01-24 DIAGNOSIS — Z85.038 HISTORY OF COLON CANCER: ICD-10-CM

## 2022-01-24 DIAGNOSIS — K21.9 GASTROESOPHAGEAL REFLUX DISEASE WITHOUT ESOPHAGITIS: ICD-10-CM

## 2022-01-24 PROCEDURE — G8427 DOCREV CUR MEDS BY ELIG CLIN: HCPCS | Performed by: INTERNAL MEDICINE

## 2022-01-24 PROCEDURE — G8399 PT W/DXA RESULTS DOCUMENT: HCPCS | Performed by: INTERNAL MEDICINE

## 2022-01-24 PROCEDURE — 1090F PRES/ABSN URINE INCON ASSESS: CPT | Performed by: INTERNAL MEDICINE

## 2022-01-24 PROCEDURE — G8754 DIAS BP LESS 90: HCPCS | Performed by: INTERNAL MEDICINE

## 2022-01-24 PROCEDURE — G8536 NO DOC ELDER MAL SCRN: HCPCS | Performed by: INTERNAL MEDICINE

## 2022-01-24 PROCEDURE — G8420 CALC BMI NORM PARAMETERS: HCPCS | Performed by: INTERNAL MEDICINE

## 2022-01-24 PROCEDURE — 1101F PT FALLS ASSESS-DOCD LE1/YR: CPT | Performed by: INTERNAL MEDICINE

## 2022-01-24 PROCEDURE — G8752 SYS BP LESS 140: HCPCS | Performed by: INTERNAL MEDICINE

## 2022-01-24 PROCEDURE — G8510 SCR DEP NEG, NO PLAN REQD: HCPCS | Performed by: INTERNAL MEDICINE

## 2022-01-24 PROCEDURE — 99214 OFFICE O/P EST MOD 30 MIN: CPT | Performed by: INTERNAL MEDICINE

## 2022-01-24 PROCEDURE — G0439 PPPS, SUBSEQ VISIT: HCPCS | Performed by: INTERNAL MEDICINE

## 2022-01-24 NOTE — PROGRESS NOTES
Estelle Rabago presents with   Chief Complaint   Patient presents with    Annual Wellness Visit    Cholesterol Problem    Hypertension            1. \"Have you been to the ER, urgent care clinic since your last visit? Hospitalized since your last visit? \" YES,Endoscopy 12-20-21    2. \"Have you seen or consulted any other health care providers outside of the [address] [place] [address] since your last visit? \" NO    3. For patients aged 39-70: Has the patient had a colonoscopy? NA - based on age     If the patient is female:    4. For patients aged 41-77: Has the patient had a mammogram within the past 2 years? NA - based on age    11. For patients aged 21-65: Has the patient had a pap smear?  NA - based on age

## 2022-01-26 NOTE — PATIENT INSTRUCTIONS
Medicare Wellness Visit, Female     The best way to live healthy is to have a lifestyle where you eat a well-balanced diet, exercise regularly, limit alcohol use, and quit all forms of tobacco/nicotine, if applicable. Regular preventive services are another way to keep healthy. Preventive services (vaccines, screening tests, monitoring & exams) can help personalize your care plan, which helps you manage your own care. Screening tests can find health problems at the earliest stages, when they are easiest to treat. [facility] follows the current, evidence-based guidelines published by the [facility] ([facility]) when recommending preventive services for our patients. Because we follow these guidelines, sometimes recommendations change over time as research supports it. (For example, mammograms used to be recommended annually. Even though Medicare will still pay for an annual mammogram, the newer guidelines recommend a mammogram every two years for women of average risk). Of course, you and your doctor may decide to screen more often for some diseases, based on your risk and your co-morbidities (chronic disease you are already diagnosed with). Preventive services for you include:  - Medicare offers their members a free annual wellness visit, which is time for you and your primary care provider to discuss and plan for your preventive service needs. Take advantage of this benefit every year!  -All adults over the age of 72 should receive the recommended pneumonia vaccines. Current USPSTF guidelines recommend a series of two vaccines for the best pneumonia protection.   -All adults should have a flu vaccine yearly and a tetanus vaccine every 10 years.   -All adults age 48 and older should receive the shingles vaccines (series of two vaccines).       -All adults age 38-68 who are overweight should have a diabetes screening test once every three years.   -All adults born between [birth year] and 1965 should be screened once for Hepatitis C.  -Other screening tests and preventive services for persons with diabetes include: an eye exam to screen for diabetic retinopathy, a kidney function test, a foot exam, and stricter control over your cholesterol.   -Cardiovascular screening for adults with routine risk involves an electrocardiogram (ECG) at intervals determined by your doctor.   -Colorectal cancer screenings should be done for adults age 54-65 with no increased risk factors for colorectal cancer. There are a number of acceptable methods of screening for this type of cancer. Each test has its own benefits and drawbacks. Discuss with your doctor what is most appropriate for you during your annual wellness visit. The different tests include: colonoscopy (considered the best screening method), a fecal occult blood test, a fecal DNA test, and sigmoidoscopy.    -A bone mass density test is recommended when a woman turns 65 to screen for osteoporosis. This test is only recommended one time, as a screening. Some providers will use this same test as a disease monitoring tool if you already have osteoporosis. -Breast cancer screenings are recommended every other year for women of normal risk, age 54-69.  -Cervical cancer screenings for women over age 72 are only recommended with certain risk factors. Here is a list of your current Health Maintenance items (your personalized list of preventive services) with a due date: There are no preventive care reminders to display for this patient.

## 2022-01-26 NOTE — PROGRESS NOTES
This is the Subsequent Medicare Annual Wellness Exam    I have reviewed the patient's medical history in detail and updated the computerized patient record. Assessment/Plan   Education and counseling provided:  Are appropriate based on today's review and evaluation  Influenza Vaccine  Screening Mammography  Colorectal cancer screening tests  Cardiovascular screening blood test  Bone mass measurement (DEXA)  Diabetes screening test    1. Chronic anemia  -     CBC W/O DIFF; Future  2. Anxiety  3. History of colon cancer  4. Hyperlipidemia, unspecified hyperlipidemia type  -     METABOLIC PANEL, COMPREHENSIVE; Future  -     LIPID PANEL; Future  -     TSH 3RD GENERATION; Future  5. Primary hypertension  6. Gastroesophageal reflux disease without esophagitis  7.  Medicare annual wellness visit, subsequent       Depression Risk Factor Screening     3 most recent PHQ Screens 1/24/2022   Little interest or pleasure in doing things Not at all   Feeling down, depressed, irritable, or hopeless Not at all   Total Score PHQ 2 0   Trouble falling or staying asleep, or sleeping too much -   Feeling tired or having little energy -   Poor appetite, weight loss, or overeating -   Feeling bad about yourself - or that you are a failure or have let yourself or your family down -   Trouble concentrating on things such as school, work, reading, or watching TV -   Moving or speaking so slowly that other people could have noticed; or the opposite being so fidgety that others notice -   Thoughts of being better off dead, or hurting yourself in some way -   PHQ 9 Score -   How difficult have these problems made it for you to do your work, take care of your home and get along with others -       Alcohol & Drug Abuse Risk Screen    Do you average more than 1 drink per night or more than 7 drinks a week:  No    On any one occasion in the past three months have you have had more than 3 drinks containing alcohol:  No          Functional Ability and Level of Safety    Hearing: Hearing is good. Activities of Daily Living: The home contains: no safety equipment. Patient does total self care      Ambulation: with no difficulty     Fall Risk:  Fall Risk Assessment, last 12 mths 1/24/2022   Able to walk? Yes   Fall in past 12 months? 0   Do you feel unsteady? 0   Are you worried about falling 0      Abuse Screen:  Patient is not abused       Cognitive Screening    Has your family/caregiver stated any concerns about your memory: no     Cognitive Screening: Normal - Mini Cog Test    Health Maintenance Due   There are no preventive care reminders to display for this patient.     Patient Care Team   Patient Care Team:  Kendal Leblanc MD as PCP - General (Internal Medicine)  Kendal Leblanc MD as PCP - [facility] Empaneled Provider  Fox Alcantara MD (Ophthalmology)  Sarah Beth Stoll MD (Surgery)  Johanne Lee MD (Physical Medicine and Rehabilitation)    History     Patient Active Problem List   Diagnosis Code    Osteopenia M85.80    GERD (gastroesophageal reflux disease) K21.9    Lumbar spinal stenosis M48.061    Advance directive on file Z78.9    Primary hypertension I10    Hyperlipidemia E78.5    Allergic rhinitis J30.9    Anxiety F41.9    History of colon cancer Z85.038    Chronic anemia D64.9     Past Medical History:   Diagnosis Date    Allergic rhinitis     Anemia     lifelong;  low fe 9/22, saw Dr Gerber De La Vega Carotid duplex study     BICA<50% (2/11); BICA<50% (9/14)    Cervical spondylosis     Chronic constipation     Colon cancer ([facility] [address] 1989    s/p partial colon resection    Colon polyp     Dr Smart American 9/6/19    FHx: heart disease     GERD (gastroesophageal reflux disease)     H/O cardiovascular stress test 01/29/2009    neg, EF 78%, no wma     Hyperlipidemia     Hypertension     Lumbar spinal stenosis     s/p decomp Dr Amina Godoy    Microscopic hematuria     neg cysto Dr Aisha Perez 2013    Osteopenia DEXA t score -0.6 spine, -1.9 hip (1/12); -0.6 spine, -1.8 hip (5/14); -1.9 spine, -2.2 hpi FRAX 12/2.8 (11/20)    Primary insomnia 11/1/2016    Vascular abnormality     RABI 1.21, LABI 1.22 (2.15)      Past Surgical History:   Procedure Laterality Date    COLONOSCOPY N/A 6/3/2016    neg 11/11; Dr Gentry Mccann 6/3/16 neg; Dr Keerthi Navarrete 9/6/19 polyp    EGD  12/20/2021         HX BUNIONECTOMY      x 2    HX HEMORRHOIDECTOMY      HX HYSTERECTOMY      HX ORTHOPAEDIC  07/2015    Dr Dominga Mackay L4-5 decomp and fusion     HX TONSILLECTOMY      [place] CYSTOURETHROSCOPY  06/2013    Dr Benard Hamman neg     Current Outpatient Medications   Medication Sig Dispense Refill    atorvastatin (LIPITOR) 10 mg tablet TAKE 1 TABLET EVERY DAY 90 Tablet 3    ammonium lactate (LAC-HYDRIN) 12 % lotion       ALPRAZolam (Xanax) 0.25 mg tablet Take 1 Tablet by mouth three (3) times daily as needed for Anxiety. Max Daily Amount: 0.75 mg. 60 Tablet 0    lisinopriL (PRINIVIL, ZESTRIL) 40 mg tablet Take 1 Tablet by mouth daily. (Patient taking differently: Take 40 mg by mouth daily. Takes half tablet daily (20 mg)) 90 Tablet 3    ipratropium (ATROVENT) 21 mcg (0.03 %) nasal spray 2 Sprays by Both Nostrils route every twelve (12) hours. 60 mL 3    hydroCHLOROthiazide (HYDRODIURIL) 12.5 mg tablet TAKE 1 TABLET EVERY DAY 90 Tab 3    polyethylene glycol (MIRALAX) 17 gram/dose powder Take 17 g by mouth daily. (Patient taking differently: Take 17 g by mouth daily as needed.) 1530 g 3    aspirin delayed-release 81 mg tablet Take 81 mg by mouth daily.  MULTIVITS,CA,MINERALS/IRON/FA (MULTI FOR HER PO) Take 1 Tab by mouth daily.  calcium-cholecalciferol, d3, (CALCIUM 600 + D) 600-125 mg-unit Tab Take 1 Tab by mouth daily.        No Known Allergies    Family History   Problem Relation Age of Onset    Heart Disease Mother     Hypertension Mother     Heart Surgery Mother         triple bypass    Heart Disease Brother     Heart Attack Brother massive MI    Hypertension Brother      Social History     Tobacco Use    Smoking status: Never Smoker    Smokeless tobacco: Never Used   Substance Use Topics    Alcohol use: Yes     Comment: diane Honeycutt MD

## 2022-01-26 NOTE — PROGRESS NOTES
68 y.o. WHITE/NON- female who presents for evaluation. No cardiovascular complaints. Her bp has been controlled when she checks. No exercise to speak of, just adls    She had gi evaluation for low iron and egd done by Dr Salma Bermudez was neg, sprue evaluation neg as well.   No heartburn, n/v.  No gu complaints    She saw Dr Ant Stewart and PT was recommended which helped a little, taking tylenol for the pain now mostly    [address]: 8/13/14, 1/19/16, 3/7/17, 11/6/20, 1/24/22    Past Medical History:   Diagnosis Date    Allergic rhinitis     Anemia     lifelong;  low fe 9/22, saw Dr Lilia Manzo Carotid duplex study     BICA<50% (2/11); BICA<50% (9/14)    Cervical spondylosis     Chronic constipation     Colon cancer ([facility] [address] 1989    s/p partial colon resection    Colon polyp     Dr Pratima Mccormick 9/6/19    FHx: heart disease     GERD (gastroesophageal reflux disease)     H/O cardiovascular stress test 01/29/2009    neg, EF 78%, no wma     Hyperlipidemia     Hypertension     Lumbar spinal stenosis     s/p decomp Dr Kae Garcia Microscopic hematuria     neg cysto Dr Guerrero Swanson 2013    Osteopenia     DEXA t score -0.6 spine, -1.9 hip (1/12); -0.6 spine, -1.8 hip (5/14); -1.9 spine, -2.2 hpi FRAX 12/2.8 (11/20)    Primary insomnia 11/1/2016    Vascular abnormality     RABI 1.21, LABI 1.22 (2.15)     Past Surgical History:   Procedure Laterality Date    COLONOSCOPY N/A 6/3/2016    neg 11/11; Dr Asberry Paget 6/3/16 neg; Dr Olman Funk 9/6/19 polyp    EGD  12/20/2021         HX BUNIONECTOMY      x 2    HX HEMORRHOIDECTOMY      HX HYSTERECTOMY      HX ORTHOPAEDIC  07/2015    Dr Eleazar Garrido L4-5 decomp and fusion     HX TONSILLECTOMY      [place] CYSTOURETHROSCOPY  06/2013    Dr Guerrero Swanson neg     Social History     Socioeconomic History    Marital status:      Spouse name: Not on file    Number of children: 2    Years of education: Not on file    Highest education level: Not on file Occupational History    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   Tobacco Use    Smoking status: Never Smoker    Smokeless tobacco: Never Used   Substance and Sexual Activity    Alcohol use: Yes     Comment: socially    Drug use: No    Sexual activity: Not on file   Other Topics Concern    Not on file   Social History Narrative    Not on file     Social Determinants of Health     Financial Resource Strain:     Difficulty of Paying Living Expenses: Not on file   Food Insecurity:     Worried About [address] [employer] in the Last Year: Not on file    [place] of Food in the Last Year: Not on file   Transportation Needs:     Lack of Transportation (Medical): Not on file    Lack of Transportation (Non-Medical): Not on file   Physical Activity:     Days of Exercise per Week: Not on file    Minutes of Exercise per Session: Not on file   Stress:     Feeling of Stress : Not on file   Social Connections:     Frequency of Communication with Friends and Family: Not on file    Frequency of Social Gatherings with Friends and Family: Not on file    Attends [employer] Services: Not on file    Active Member of [address] [place] [address] or Organizations: Not on file    Attends Club or Organization Meetings: Not on file    Marital Status: Not on file   Intimate Partner Violence:     Fear of Current or Ex-Partner: Not on file    Emotionally Abused: Not on file    Physically Abused: Not on file    Sexually Abused: Not on file   Housing Stability:     Unable to Pay for Housing in the Last Year: Not on file    Number of [place] in the Last Year: Not on file    Unstable Housing in the Last Year: Not on file     Current Outpatient Medications   Medication Sig    atorvastatin (LIPITOR) 10 mg tablet TAKE 1 TABLET EVERY DAY    ammonium lactate (LAC-HYDRIN) 12 % lotion     ALPRAZolam (Xanax) 0.25 mg tablet Take 1 Tablet by mouth three (3) times daily as needed for Anxiety.  Max Daily Amount: 0.75 mg.    lisinopriL (PRINIVIL, ZESTRIL) 40 mg tablet Take 1 Tablet by mouth daily. (Patient taking differently: Take 40 mg by mouth daily. Takes half tablet daily (20 mg))    ipratropium (ATROVENT) 21 mcg (0.03 %) nasal spray 2 Sprays by Both Nostrils route every twelve (12) hours.  hydroCHLOROthiazide (HYDRODIURIL) 12.5 mg tablet TAKE 1 TABLET EVERY DAY    polyethylene glycol (MIRALAX) 17 gram/dose powder Take 17 g by mouth daily. (Patient taking differently: Take 17 g by mouth daily as needed.)    aspirin delayed-release 81 mg tablet Take 81 mg by mouth daily.  MULTIVITS,CA,MINERALS/IRON/FA (MULTI FOR HER PO) Take 1 Tab by mouth daily.  calcium-cholecalciferol, d3, (CALCIUM 600 + D) 600-125 mg-unit Tab Take 1 Tab by mouth daily. No current facility-administered medications for this visit. No Known Allergies    REVIEW OF SYSTEMS: colo 9/19 Dr Estefany Valentin, Blessing Orozco 7/20. DEXA 5/14  Ophtho - no vision change or eye pain  Oral - no mouth pain, tongue or tooth problems  Ears - no hearing loss, ear pain, fullness, no swallowing problems  Cardiac - no CP, PND, orthopnea, edema, palpitations or syncope  Chest - no breast masses  Resp - no wheezing, chronic coughing, dyspnea  GI - no heartburn, nausea, vomiting, change in bowel habits, bleeding, hemorrhoids  Urinary - no dysuria, hematuria, flank pain, urgency, frequency    Visit Vitals  BP (!) 118/57   Pulse 60   Temp 97.5 °F (36.4 °C) (Temporal)   Resp 12   Ht 5' 1\" (1.549 m)   Wt 113 lb (51.3 kg)   SpO2 100%   BMI 21.35 kg/m²     A&O x3  Affect is appropriate. Mood stable  No apparent distress  Anicteric, no JVD, adenopathy or thyromegaly. No carotid bruits or radiated murmur  Lungs clear to auscultation, no wheezes or rales  Heart showed regular rate and rhythm. No murmur, rubs, gallops  Abdomen soft nontender, no hepatosplenomegaly or masses. Extremities without edema.   Pulses 1-2+ symmetrically    LABS  From 3/15 showed   gluc 81, cr 0.82, gfr>60, alt 8,   chol 175, tg 50, hdl 77, ldl-c 88,   wbc 5.0, hb 11.8, plt 194, tsh 3.54, vit d 40.2  From 9/15 showed                           uric 4.6  From 6/16 showed   gluc 81, cr 0.72, gfr>60, alt 16, chol 190, tg 98, hdl 67, ldl-c 103, wbc 5.8, hb 11.7, plt 236, tsh 2.24  From 6/17 showed   gluc 84, cr 0.94, gfr 59,  alt 25, chol 192, tg 66, hdl 75, ldl-c 104, wbc 5.3, hb 11.3, plt 201, tsh 1.30, vit d 35.1  From 3/18 showed   gluc 72, cr 0.94, gfr 58,  alt 30,           wbc 5.3, hb 11.2, plt 253, tsh 1.20, ft4 1.10  Form 4/19 showed   gluc 90, cr 0.84, gfr>60, alt 26, chol 181, tg 49, hdl 98, ldl-c 73,   wbc 3.9, hb 11.7, plt 217, tsh 0.96, ft4 1.10  From 9/19 showed   gluc 86, cr 0.90, gfr>60,           wbc 4.7, hb 11.9, plt 222  From 4/20 showed   gluc 82, cr 0.95, gfr 57,  alt 23, chol 187, tg 91, hdl 74, ldl-c 95,   wbc 4.4, hb 11.5, plt 221  From 11/20 showed gluc 81, cr 0.88, gfr>60, alt 28, chol 172, tg 86, hdl 71, ldl-c 84,   wbc 4.7, hb 11.3, plt 204  From 3/21 showed   gluc 80, cr 1.04, gfr 52  From 9/21 showed   gluc 85, cr 0.95, gfr>60, alt 25, chol 184, tg 65, hdl 74, ldl-c 97,   wbc 5.7, hb 9.8,   plt 220, tsh 1.02, ft4 0.90, fe 58, %sat 17, ferritin 28, b12 290, fol>20, spep neg, retic 1.7    We reviewed the patient's labs from the last several visits to point out trends in the numbers        Patient Active Problem List   Diagnosis Code    Osteopenia M85.80    GERD (gastroesophageal reflux disease) K21.9    Lumbar spinal stenosis M48.061    Advance directive on file Z78.9    Primary hypertension I10    Hyperlipidemia E78.5    Allergic rhinitis J30.9    Anxiety F41.9    History of colon cancer Z85.038    Chronic anemia D64.9     Assessment and plan:  1. Osteopenia. Weight bearing exercise, Ca/d  2. GERD. PPI and avoidance measures  3. Spinal stenosis. Continue current  4. HTN. Controlled on belkis and diuretic  5. HLP. Continue statin and FO  6. Anxiety. Continue current regimen. 7. H/o colon ca. Surveillance per GI  8. Anemia. fe supp        RTC 7/22    Above conditions discussed at length and patient vocalized understanding. All questions answered to patient satisfaction            ICD-10-CM ICD-9-CM    1. Chronic anemia  D64.9 285.9 CBC W/O DIFF   2. Anxiety  F41.9 300.00    3. History of colon cancer  Z85.038 V10.05    4. Hyperlipidemia, unspecified hyperlipidemia type  V31.2 388.5 METABOLIC PANEL, COMPREHENSIVE      LIPID PANEL      TSH 3RD GENERATION   5. Primary hypertension  I10 401.9    6.  Gastroesophageal reflux disease without esophagitis  K21.9 530.81

## 2022-03-04 ENCOUNTER — COMPREHENSIVE EXAM (OUTPATIENT)
Dept: URBAN - METROPOLITAN AREA CLINIC 1 | Facility: CLINIC | Age: 77
End: 2022-03-04

## 2022-03-04 DIAGNOSIS — H35.362: ICD-10-CM

## 2022-03-04 DIAGNOSIS — H04.123: ICD-10-CM

## 2022-03-04 DIAGNOSIS — H40.023: ICD-10-CM

## 2022-03-04 DIAGNOSIS — H16.143: ICD-10-CM

## 2022-03-04 DIAGNOSIS — Z96.1: ICD-10-CM

## 2022-03-04 PROCEDURE — 92133 CPTRZD OPH DX IMG PST SGM ON: CPT

## 2022-03-04 PROCEDURE — 92014 COMPRE OPH EXAM EST PT 1/>: CPT

## 2022-03-04 PROCEDURE — 92015 DETERMINE REFRACTIVE STATE: CPT

## 2022-03-04 ASSESSMENT — KERATOMETRY
OS_K1POWER_DIOPTERS: 43.25
OS_AXISANGLE2_DEGREES: 080
OD_AXISANGLE_DEGREES: 4
OD_K2POWER_DIOPTERS: 44.50
OS_AXISANGLE_DEGREES: 170
OS_K2POWER_DIOPTERS: 44.00
OD_AXISANGLE2_DEGREES: 094
OD_K1POWER_DIOPTERS: 43.25

## 2022-03-04 ASSESSMENT — VISUAL ACUITY
OS_SC: J3
OD_SC: 20/25

## 2022-03-04 ASSESSMENT — TONOMETRY
OS_IOP_MMHG: 12
OD_IOP_MMHG: 12

## 2022-03-04 NOTE — PATIENT DISCUSSION
Continue Restasis BID OU. Increase ATs Q2h OU x 1 week then reevaluate vision/symptoms. Recommend switch to Preservative Free. Patient should return if symptoms do not improve in a month after increasing ATs.

## 2022-03-04 NOTE — PATIENT DISCUSSION
(CD 0.8) IOP stable OU. OCT WNL OU. Patient is considered high risk. Condition was discussed with patient and patient understands. Will continue to monitor patient for any progression in condition. Patient was advised to call us with any problems, questions, or concerns.

## 2022-03-18 PROBLEM — D64.9 CHRONIC ANEMIA: Status: ACTIVE | Noted: 2020-11-05

## 2022-03-19 PROBLEM — Z85.038 HISTORY OF COLON CANCER: Status: ACTIVE | Noted: 2018-08-24

## 2022-04-02 ASSESSMENT — VISUAL ACUITY
OS_CC: 20/100
OD_CC: 20/20
OD_CC: 20/25
OS_CC: 20/100
OD_CC: 20/20
OS_CC: 20/100
OS_SC: 20/20
OD_CC: 20/20
OS_CC: 20/100
OS_SC: J2
OS_SC: J1
OD_CC: 20/25
OS_SC: J1
OS_SC: J1

## 2022-04-02 ASSESSMENT — TONOMETRY
OS_IOP_MMHG: 16
OD_IOP_MMHG: 14
OS_IOP_MMHG: 14
OD_IOP_MMHG: 13
OS_IOP_MMHG: 10
OS_IOP_MMHG: 13
OD_IOP_MMHG: 13
OD_IOP_MMHG: 15
OD_IOP_MMHG: 15
OS_IOP_MMHG: 17

## 2022-05-06 RX ORDER — HYDROCHLOROTHIAZIDE 12.5 MG/1
TABLET ORAL
Qty: 90 TABLET | Refills: 3 | Status: SHIPPED | OUTPATIENT
Start: 2022-05-06 | End: 2022-07-26 | Stop reason: ALTCHOICE

## 2022-06-29 ENCOUNTER — TRANSCRIBE ORDER (OUTPATIENT)
Dept: SCHEDULING | Age: 77
End: 2022-06-29

## 2022-06-29 DIAGNOSIS — Z12.31 VISIT FOR SCREENING MAMMOGRAM: Primary | ICD-10-CM

## 2022-07-18 ENCOUNTER — HOSPITAL ENCOUNTER (OUTPATIENT)
Dept: MAMMOGRAPHY | Age: 77
Discharge: HOME | End: 2022-07-18
Attending: INTERNAL MEDICINE
Payer: MEDICARE

## 2022-07-18 DIAGNOSIS — Z12.31 VISIT FOR SCREENING MAMMOGRAM: ICD-10-CM

## 2022-07-18 PROCEDURE — 77063 BREAST TOMOSYNTHESIS BI: CPT

## 2022-07-19 ENCOUNTER — HOSPITAL ENCOUNTER (OUTPATIENT)
Dept: LAB | Age: 77
Discharge: HOME | End: 2022-07-19
Payer: MEDICARE

## 2022-07-19 ENCOUNTER — APPOINTMENT (OUTPATIENT)
Dept: INTERNAL MEDICINE CLINIC | Age: 77
End: 2022-07-19

## 2022-07-19 DIAGNOSIS — E78.5 HYPERLIPIDEMIA, UNSPECIFIED HYPERLIPIDEMIA TYPE: ICD-10-CM

## 2022-07-19 DIAGNOSIS — D64.9 CHRONIC ANEMIA: ICD-10-CM

## 2022-07-19 LAB
ALBUMIN SERPL-MCNC: 4.1 G/DL (ref 3.4–5)
ALBUMIN/GLOB SERPL: 1.2 {RATIO} (ref 0.8–1.7)
ALP SERPL-CCNC: 100 U/L (ref 45–117)
ALT SERPL-CCNC: 22 U/L (ref 13–56)
ANION GAP SERPL CALC-SCNC: 7 MMOL/L (ref 3–18)
AST SERPL-CCNC: 25 U/L (ref 10–38)
BILIRUB SERPL-MCNC: 0.9 MG/DL (ref 0.2–1)
BUN SERPL-MCNC: 22 MG/DL (ref 7–18)
BUN/CREAT SERPL: 19 (ref 12–20)
CALCIUM SERPL-MCNC: 9 MG/DL (ref 8.5–10.1)
CHLORIDE SERPL-SCNC: 105 MMOL/L (ref 100–111)
CHOLEST SERPL-MCNC: 188 MG/DL
CO2 SERPL-SCNC: 27 MMOL/L (ref 21–32)
CREAT SERPL-MCNC: 1.17 MG/DL (ref 0.6–1.3)
ERYTHROCYTE [DISTWIDTH] IN BLOOD BY AUTOMATED COUNT: 13.3 % (ref 11.6–14.5)
GLOBULIN SER CALC-MCNC: 3.4 G/DL (ref 2–4)
GLUCOSE SERPL-MCNC: 72 MG/DL (ref 74–99)
HCT VFR BLD AUTO: 33.8 % (ref 35–45)
HDLC SERPL-MCNC: 74 MG/DL (ref 40–60)
HDLC SERPL: 2.5 {RATIO} (ref 0–5)
HGB BLD-MCNC: 10.8 G/DL (ref 12–16)
LDLC SERPL CALC-MCNC: 98.6 MG/DL (ref 0–100)
LIPID PROFILE,FLP: ABNORMAL
MCH RBC QN AUTO: 28.6 PG (ref 24–34)
MCHC RBC AUTO-ENTMCNC: 32 G/DL (ref 31–37)
MCV RBC AUTO: 89.4 FL (ref 78–100)
NRBC # BLD: 0 K/UL (ref 0–0.01)
NRBC BLD-RTO: 0 PER 100 WBC
PLATELET # BLD AUTO: 208 K/UL (ref 135–420)
PMV BLD AUTO: 10.7 FL (ref 9.2–11.8)
POTASSIUM SERPL-SCNC: 3.9 MMOL/L (ref 3.5–5.5)
PROT SERPL-MCNC: 7.5 G/DL (ref 6.4–8.2)
RBC # BLD AUTO: 3.78 M/UL (ref 4.2–5.3)
SODIUM SERPL-SCNC: 139 MMOL/L (ref 136–145)
TRIGL SERPL-MCNC: 77 MG/DL (ref ?–150)
TSH SERPL DL<=0.05 MIU/L-ACNC: 1.67 UIU/ML (ref 0.36–3.74)
VLDLC SERPL CALC-MCNC: 15.4 MG/DL
WBC # BLD AUTO: 5.9 K/UL (ref 4.6–13.2)

## 2022-07-19 PROCEDURE — 85027 COMPLETE CBC AUTOMATED: CPT

## 2022-07-19 PROCEDURE — 80053 COMPREHEN METABOLIC PANEL: CPT

## 2022-07-19 PROCEDURE — 80061 LIPID PANEL: CPT

## 2022-07-19 PROCEDURE — 36415 COLL VENOUS BLD VENIPUNCTURE: CPT

## 2022-07-19 PROCEDURE — 84443 ASSAY THYROID STIM HORMONE: CPT

## 2022-07-22 NOTE — PROGRESS NOTES
68 y.o. WHITE/NON- female who presents for evaluation. No cardiovascular complaints. Her bp has been spiking up top 160 and down to low 100s on a calibrated machine. She's on belkis 20 and hctz which we had added in the last year to try to get better control. No exercise to speak of, just adls.  Admits she has not been as diligent keeping up with her hydration as she tends to forget    No gu or gi complaints    LAST MEDICARE WELLNESS EXAM: 8/13/14, 1/19/16, 3/7/17, 11/6/20, 1/24/22    Past Medical History:   Diagnosis Date    Allergic rhinitis     Anemia     lifelong;  low fe 9/22, saw Dr Marcellus Lam     Carotid artery disease ([facility] [address]     BICA<50% (2/11); BICA<50% (9/14)    Cervical spondylosis     Chronic constipation     Colon cancer ([facility] [address] 1989    s/p partial colon resection    Colon polyp     Dr Mayito Urias 9/6/19    FHx: heart disease     GERD (gastroesophageal reflux disease)     H/O cardiovascular stress test 01/29/2009    neg, EF 78%, no wma     Hyperlipidemia     Hypertension     Lumbar spinal stenosis     s/p decomp Dr Raisa Llanes    Microscopic hematuria     neg cysto Dr Jessi Orona 2013    Osteopenia     DEXA t score -0.6 spine, -1.9 hip (1/12); -0.6 spine, -1.8 hip (5/14); -1.9 spine, -2.2 hpi FRAX 12/2.8 (11/20)    Primary insomnia 11/01/2016    Vascular abnormality     RABI 1.21, LABI 1.22 (2.15)     Past Surgical History:   Procedure Laterality Date    COLONOSCOPY N/A 06/03/2016    neg 11/11; Dr Jazlyn Paulson 6/3/16 neg; Dr Radha Montero 9/6/19 polyp    EGD  12/20/2021         HX BUNIONECTOMY      x 2    HX HEMORRHOIDECTOMY      HX HYSTERECTOMY      HX LUMBAR FUSION  07/2015    Dr Raisa Llanes L4-5 decomp and fusion     HX TONSILLECTOMY      LA CYSTOURETHROSCOPY  06/2013    Dr Jessi Orona neg     Social History     Socioeconomic History    Marital status:      Spouse name: Not on file    Number of children: 2    Years of education: Not on file    Highest education level: Not on file   Occupational History 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   Tobacco Use    Smoking status: Never    Smokeless tobacco: Never   Substance and Sexual Activity    Alcohol use: Yes     Comment: socially    Drug use: No    Sexual activity: Not on file   Other Topics Concern    Not on file   Social History Narrative    Not on file     Social Determinants of Health     Financial Resource Strain: Not on file   Food Insecurity: Not on file   Transportation Needs: Not on file   Physical Activity: Not on file   Stress: Not on file   Social Connections: Not on file   Intimate Partner Violence: Not on file   Housing Stability: Not on file     Current Outpatient Medications   Medication Sig    amLODIPine (NORVASC) 2.5 mg tablet Take 1 Tablet by mouth in the morning. atorvastatin (LIPITOR) 10 mg tablet TAKE 1 TABLET EVERY DAY    ammonium lactate (LAC-HYDRIN) 12 % lotion     ALPRAZolam (Xanax) 0.25 mg tablet Take 1 Tablet by mouth three (3) times daily as needed for Anxiety. Max Daily Amount: 0.75 mg.    lisinopriL (PRINIVIL, ZESTRIL) 40 mg tablet Take 1 Tablet by mouth daily. (Patient taking differently: Take 20 mg by mouth in the morning. Takes half tablet daily (20 mg))    ipratropium (ATROVENT) 21 mcg (0.03 %) nasal spray 2 Sprays by Both Nostrils route every twelve (12) hours. polyethylene glycol (MIRALAX) 17 gram/dose powder Take 17 g by mouth daily. (Patient taking differently: Take 17 g by mouth daily as needed.)    aspirin delayed-release 81 mg tablet Take 81 mg by mouth daily. MULTIVITS,CA,MINERALS/IRON/FA (MULTI FOR HER PO) Take 1 Tab by mouth daily. calcium-cholecalciferol, d3, 600-125 mg-unit tab Take 1 Tab by mouth daily. No current facility-administered medications for this visit. No Known Allergies    REVIEW OF SYSTEMS: colo 9/19 Dr Robina Jones, Jordyn Walsh 7/20.  DEXA 5/14  Ophtho - no vision change or eye pain  Oral - no mouth pain, tongue or tooth problems  Ears - no hearing loss, ear pain, fullness, no swallowing problems  Cardiac - no CP, PND, orthopnea, edema, palpitations or syncope  Chest - no breast masses  Resp - no wheezing, chronic coughing, dyspnea  GI - no heartburn, nausea, vomiting, change in bowel habits, bleeding, hemorrhoids  Urinary - no dysuria, hematuria, flank pain, urgency, frequency    Visit Vitals  /79   Pulse 70   Temp 97.7 °F (36.5 °C) (Temporal)   Resp 16   Ht 5' 1\" (1.549 m)   Wt 116 lb (52.6 kg)   SpO2 98%   BMI 21.92 kg/m²       A&O x3  Affect is appropriate. Mood stable  No apparent distress  Anicteric, no JVD, adenopathy or thyromegaly. No carotid bruits or radiated murmur  Lungs clear to auscultation, no wheezes or rales  Heart showed regular rate and rhythm. No murmur, rubs, gallops  Abdomen soft nontender, no hepatosplenomegaly or masses. Extremities without edema.   Pulses 1-2+ symmetrically    LABS  From 3/15 showed   gluc 81, cr 0.82, gfr>60, alt 8,   chol 175, tg 50, hdl 77, ldl-c 88,   wbc 5.0, hb 11.8, plt 194, tsh 3.54, vit d 40.2  From 9/15 showed                             uric 4.6  From 6/16 showed   gluc 81, cr 0.72, gfr>60, alt 16, chol 190, tg 98, hdl 67, ldl-c 103, wbc 5.8, hb 11.7, plt 236, tsh 2.24  From 6/17 showed   gluc 84, cr 0.94, gfr 59,  alt 25, chol 192, tg 66, hdl 75, ldl-c 104, wbc 5.3, hb 11.3, plt 201, tsh 1.30, vit d 35.1  From 3/18 showed   gluc 72, cr 0.94, gfr 58,  alt 30,           wbc 5.3, hb 11.2, plt 253, tsh 1.20, ft4 1.10  Form 4/19 showed   gluc 90, cr 0.84, gfr>60, alt 26, chol 181, tg 49, hdl 98, ldl-c 73,   wbc 3.9, hb 11.7, plt 217, tsh 0.96, ft4 1.10  From 9/19 showed   gluc 86, cr 0.90, gfr>60,            wbc 4.7, hb 11.9, plt 222  From 4/20 showed   gluc 82, cr 0.95, gfr 57,  alt 23, chol 187, tg 91, hdl 74, ldl-c 95,   wbc 4.4, hb 11.5, plt 221  From 11/20 showed gluc 81, cr 0.88, gfr>60, alt 28, chol 172, tg 86, hdl 71, ldl-c 84,   wbc 4.7, hb 11.3, plt 204  From 3/21 showed   gluc 80, cr 1.04, gfr 52  From 9/21 showed   gluc 85, cr 0.95, gfr>60, alt 25, chol 184, tg 65, hdl 74, ldl-c 97,   wbc 5.7, hb 9.8,   plt 220, tsh 1.02, ft4 0.90, fe 58, %sat 17, ferritin 28, b12 290, fol>20, spep neg, retic 1.7    Results for orders placed or performed during the hospital encounter of 07/19/22   CBC W/O DIFF   Result Value Ref Range    WBC 5.9 4.6 - 13.2 K/uL    RBC 3.78 (L) 4.20 - 5.30 M/uL    HGB 10.8 (L) 12.0 - 16.0 g/dL    HCT 33.8 (L) 35.0 - 45.0 %    MCV 89.4 78.0 - 100.0 FL    MCH 28.6 24.0 - 34.0 PG    MCHC 32.0 31.0 - 37.0 g/dL    RDW 13.3 11.6 - 14.5 %    PLATELET 016 183 - 077 K/uL    MPV 10.7 9.2 - 11.8 FL    NRBC 0.0 0  WBC    ABSOLUTE NRBC 0.00 0.00 - 6.52 K/uL   METABOLIC PANEL, COMPREHENSIVE   Result Value Ref Range    Sodium 139 136 - 145 mmol/L    Potassium 3.9 3.5 - 5.5 mmol/L    Chloride 105 100 - 111 mmol/L    CO2 27 21 - 32 mmol/L    Anion gap 7 3.0 - 18 mmol/L    Glucose 72 (L) 74 - 99 mg/dL    BUN 22 (H) 7.0 - 18 MG/DL    Creatinine 1.17 0.6 - 1.3 MG/DL    BUN/Creatinine ratio 19 12 - 20      GFR est AA 54 (L) >60 ml/min/1.73m2    GFR est non-AA 45 (L) >60 ml/min/1.73m2    Calcium 9.0 8.5 - 10.1 MG/DL    Bilirubin, total 0.9 0.2 - 1.0 MG/DL    ALT (SGPT) 22 13 - 56 U/L    AST (SGOT) 25 10 - 38 U/L    Alk.  phosphatase 100 45 - 117 U/L    Protein, total 7.5 6.4 - 8.2 g/dL    Albumin 4.1 3.4 - 5.0 g/dL    Globulin 3.4 2.0 - 4.0 g/dL    A-G Ratio 1.2 0.8 - 1.7     LIPID PANEL   Result Value Ref Range    LIPID PROFILE          Cholesterol, total 188 <200 MG/DL    Triglyceride 77 <150 MG/DL    HDL Cholesterol 74 (H) 40 - 60 MG/DL    LDL, calculated 98.6 0 - 100 MG/DL    VLDL, calculated 15.4 MG/DL    CHOL/HDL Ratio 2.5 0 - 5.0     TSH 3RD GENERATION   Result Value Ref Range    TSH 1.67 0.36 - 3.74 uIU/mL       We reviewed the patient's labs from the last several visits to point out trends in the numbers        Patient Active Problem List   Diagnosis Code    Osteopenia M85.80    GERD (gastroesophageal reflux disease) K21.9 Lumbar spinal stenosis M48.061    Advance directive on file Z78.9    Primary hypertension I10    Hyperlipidemia E78.5    Allergic rhinitis J30.9    Anxiety F41.9    History of colon cancer Z85.038    Chronic anemia D64.9     Assessment and plan:  1. Osteopenia. Weight bearing exercise, Ca/d  2. GERD. PPI and avoidance measures  3. Renal.  With borderline high bun/cr ratio, will hold diuretic, check ua next visit  4. HTN. Will stop diuretic as above and change to amlo after discussing possible sfx  5. HLP. Continue statin and FO  6. Anxiety. Continue current regimen. 7. H/o colon ca. Surveillance per GI  8. Anemia. fe supp        RTC 11/22    Above conditions discussed at length and patient vocalized understanding. All questions answered to patient satisfaction            ICD-10-CM ICD-9-CM    1. Primary hypertension  I10 401.9 amLODIPine (NORVASC) 2.5 mg tablet      METABOLIC PANEL, BASIC      URINALYSIS W/ RFLX MICROSCOPIC      2. Chronic anemia  D64.9 285.9       3. Hyperlipidemia, unspecified hyperlipidemia type  E78.5 272.4       4.  Elevated serum creatinine  D13.32 900.58 METABOLIC PANEL, BASIC      URINALYSIS W/ RFLX MICROSCOPIC

## 2022-07-26 ENCOUNTER — OFFICE VISIT (OUTPATIENT)
Dept: INTERNAL MEDICINE CLINIC | Age: 77
End: 2022-07-26
Payer: MEDICARE

## 2022-07-26 VITALS
TEMPERATURE: 97.7 F | SYSTOLIC BLOOD PRESSURE: 129 MMHG | OXYGEN SATURATION: 98 % | HEIGHT: 61 IN | DIASTOLIC BLOOD PRESSURE: 79 MMHG | WEIGHT: 116 LBS | RESPIRATION RATE: 16 BRPM | BODY MASS INDEX: 21.9 KG/M2 | HEART RATE: 70 BPM

## 2022-07-26 DIAGNOSIS — I10 PRIMARY HYPERTENSION: Primary | ICD-10-CM

## 2022-07-26 DIAGNOSIS — R79.89 ELEVATED SERUM CREATININE: ICD-10-CM

## 2022-07-26 DIAGNOSIS — E78.5 HYPERLIPIDEMIA, UNSPECIFIED HYPERLIPIDEMIA TYPE: ICD-10-CM

## 2022-07-26 DIAGNOSIS — D64.9 CHRONIC ANEMIA: ICD-10-CM

## 2022-07-26 PROCEDURE — 1101F PT FALLS ASSESS-DOCD LE1/YR: CPT | Performed by: INTERNAL MEDICINE

## 2022-07-26 PROCEDURE — 99214 OFFICE O/P EST MOD 30 MIN: CPT | Performed by: INTERNAL MEDICINE

## 2022-07-26 PROCEDURE — G8427 DOCREV CUR MEDS BY ELIG CLIN: HCPCS | Performed by: INTERNAL MEDICINE

## 2022-07-26 PROCEDURE — 1090F PRES/ABSN URINE INCON ASSESS: CPT | Performed by: INTERNAL MEDICINE

## 2022-07-26 PROCEDURE — G8752 SYS BP LESS 140: HCPCS | Performed by: INTERNAL MEDICINE

## 2022-07-26 PROCEDURE — G8754 DIAS BP LESS 90: HCPCS | Performed by: INTERNAL MEDICINE

## 2022-07-26 PROCEDURE — G8536 NO DOC ELDER MAL SCRN: HCPCS | Performed by: INTERNAL MEDICINE

## 2022-07-26 PROCEDURE — G8510 SCR DEP NEG, NO PLAN REQD: HCPCS | Performed by: INTERNAL MEDICINE

## 2022-07-26 PROCEDURE — G8420 CALC BMI NORM PARAMETERS: HCPCS | Performed by: INTERNAL MEDICINE

## 2022-07-26 PROCEDURE — G0463 HOSPITAL OUTPT CLINIC VISIT: HCPCS | Performed by: INTERNAL MEDICINE

## 2022-07-26 PROCEDURE — 1123F ACP DISCUSS/DSCN MKR DOCD: CPT | Performed by: INTERNAL MEDICINE

## 2022-07-26 PROCEDURE — G8399 PT W/DXA RESULTS DOCUMENT: HCPCS | Performed by: INTERNAL MEDICINE

## 2022-07-26 RX ORDER — AMLODIPINE BESYLATE 2.5 MG/1
2.5 TABLET ORAL DAILY
Qty: 30 TABLET | Refills: 5 | Status: SHIPPED | OUTPATIENT
Start: 2022-07-26

## 2022-07-28 ENCOUNTER — TELEPHONE (OUTPATIENT)
Dept: INTERNAL MEDICINE CLINIC | Age: 77
End: 2022-07-28

## 2022-07-28 NOTE — TELEPHONE ENCOUNTER
I sent it to [facility] as i'm not sure what the final dose will be  When we have that ironed out, then we can send the 3 mos scripts

## 2022-07-28 NOTE — TELEPHONE ENCOUNTER
Patient called, states that she was prescribed amlodipine (norvasc) and it was sent to [address]. She is asking if it can be sent to [address] instead?     Please advise, thank you

## 2022-07-29 NOTE — TELEPHONE ENCOUNTER
Patient reached and given information, verbalized understanding, stated she will pick it up from [facility].

## 2022-08-03 RX ORDER — IPRATROPIUM BROMIDE 21 UG/1
SPRAY, METERED NASAL
Qty: 60 ML | Refills: 3 | Status: SHIPPED | OUTPATIENT
Start: 2022-08-03

## 2022-09-21 ENCOUNTER — OFFICE VISIT (OUTPATIENT)
Dept: ORTHOPEDIC SURGERY | Age: 77
End: 2022-09-21
Payer: MEDICARE

## 2022-09-21 VITALS
WEIGHT: 116 LBS | HEIGHT: 61 IN | BODY MASS INDEX: 21.9 KG/M2 | HEART RATE: 70 BPM | RESPIRATION RATE: 14 BRPM | OXYGEN SATURATION: 99 %

## 2022-09-21 DIAGNOSIS — M99.02 THORACIC REGION SOMATIC DYSFUNCTION: ICD-10-CM

## 2022-09-21 DIAGNOSIS — M50.30 DDD (DEGENERATIVE DISC DISEASE), CERVICAL: Primary | ICD-10-CM

## 2022-09-21 DIAGNOSIS — M99.06 LOWER LIMB REGION SOMATIC DYSFUNCTION: ICD-10-CM

## 2022-09-21 DIAGNOSIS — M99.07 UPPER EXTREMITY SOMATIC DYSFUNCTION: ICD-10-CM

## 2022-09-21 DIAGNOSIS — M99.04 SACRAL REGION SOMATIC DYSFUNCTION: ICD-10-CM

## 2022-09-21 DIAGNOSIS — M99.08 RIB CAGE REGION SOMATIC DYSFUNCTION: ICD-10-CM

## 2022-09-21 DIAGNOSIS — M99.03 LUMBAR REGION SOMATIC DYSFUNCTION: ICD-10-CM

## 2022-09-21 DIAGNOSIS — M99.05 PELVIC SOMATIC DYSFUNCTION: ICD-10-CM

## 2022-09-21 DIAGNOSIS — M99.01 CERVICAL SOMATIC DYSFUNCTION: ICD-10-CM

## 2022-09-21 DIAGNOSIS — M99.09 SOMATIC DYSFUNCTION OF ABDOMINAL REGION: ICD-10-CM

## 2022-09-21 PROCEDURE — 1101F PT FALLS ASSESS-DOCD LE1/YR: CPT | Performed by: FAMILY MEDICINE

## 2022-09-21 PROCEDURE — G8420 CALC BMI NORM PARAMETERS: HCPCS | Performed by: FAMILY MEDICINE

## 2022-09-21 PROCEDURE — 1123F ACP DISCUSS/DSCN MKR DOCD: CPT | Performed by: FAMILY MEDICINE

## 2022-09-21 PROCEDURE — G8536 NO DOC ELDER MAL SCRN: HCPCS | Performed by: FAMILY MEDICINE

## 2022-09-21 PROCEDURE — G8400 PT W/DXA NO RESULTS DOC: HCPCS | Performed by: FAMILY MEDICINE

## 2022-09-21 PROCEDURE — G8432 DEP SCR NOT DOC, RNG: HCPCS | Performed by: FAMILY MEDICINE

## 2022-09-21 PROCEDURE — 1090F PRES/ABSN URINE INCON ASSESS: CPT | Performed by: FAMILY MEDICINE

## 2022-09-21 PROCEDURE — 99204 OFFICE O/P NEW MOD 45 MIN: CPT | Performed by: FAMILY MEDICINE

## 2022-09-21 PROCEDURE — 98929 OSTEOPATH MANJ 9-10 REGIONS: CPT | Performed by: FAMILY MEDICINE

## 2022-09-21 PROCEDURE — G8427 DOCREV CUR MEDS BY ELIG CLIN: HCPCS | Performed by: FAMILY MEDICINE

## 2022-09-21 RX ORDER — LISINOPRIL 20 MG/1
TABLET ORAL DAILY
COMMUNITY

## 2022-09-21 RX ORDER — DICLOFENAC SODIUM 25 MG/1
25 TABLET, DELAYED RELEASE ORAL 2 TIMES DAILY
Qty: 60 TABLET | Refills: 1 | Status: SHIPPED | OUTPATIENT
Start: 2022-09-21

## 2022-09-21 RX ORDER — AMLODIPINE BESYLATE 10 MG/1
TABLET ORAL DAILY
COMMUNITY

## 2022-09-21 RX ORDER — ATORVASTATIN CALCIUM 40 MG/1
TABLET, FILM COATED ORAL DAILY
COMMUNITY

## 2022-09-21 RX ORDER — BISMUTH SUBSALICYLATE 262 MG
1 TABLET,CHEWABLE ORAL DAILY
COMMUNITY

## 2022-09-21 RX ORDER — CALCIUM CARBONATE/VITAMIN D3 600 MG-125
TABLET ORAL
COMMUNITY
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
## 2022-09-29 ENCOUNTER — HOSPITAL ENCOUNTER (OUTPATIENT)
Dept: PHYSICAL THERAPY | Age: 77
Discharge: HOME | End: 2022-09-29
Attending: FAMILY MEDICINE
Payer: MEDICARE

## 2022-09-29 PROCEDURE — 97140 MANUAL THERAPY 1/> REGIONS: CPT | Performed by: PHYSICAL THERAPIST

## 2022-09-29 PROCEDURE — 97161 PT EVAL LOW COMPLEX 20 MIN: CPT | Performed by: PHYSICAL THERAPIST

## 2022-09-29 PROCEDURE — 97110 THERAPEUTIC EXERCISES: CPT | Performed by: PHYSICAL THERAPIST

## 2022-09-29 NOTE — PROGRESS NOTES
In [address] [place] [address] Square  [facility], [address] [place] [address], [address] [place], [place] [address]y [address]  [phone] fax      Plan of Care/ Statement of Necessity for Physical Therapy Services    Patient name: Larissa Goldsmith Start of Care: 2022   Referral source: Wagner De La Fuente DO : 1945    Medical Diagnosis: Other low back pain [M54.59]  Neck pain [M54.2]  DDD (degenerative disc disease), cervical [M50.30]  Payor: [place] MEDICARE / Plan: VA MEDICARE PART A & B / Product Type: Medicare /  Onset Date:2022    Treatment Diagnosis: low back pain, neck pain   Prior Hospitalization: see medical history Provider#: [id number]   Medications: Verified on Patient summary List    Comorbidities: Patient reports: arthritis, back pain, headaches, high blood pressure   Prior Level of Function: I with ADLs, driving, housework, line dancing 1x/week without issues. The Plan of Care and following information is based on the information from the initial evaluation. Assessment/ key information: Patient is a pleasant older adult female with sub-acute on chronic neck pain of insidious onset. Also history of lumbar fusion with no back pain until recently. Special testing today suggests pain is postural dysfunction in nature with limitation into rotation primarily, especially to the Left. Has felt much better the last week since starting a new prescribed medication. Treatment will focus on increased functional ROM with activities such as driving.    Evaluation Complexity History MEDIUM  Complexity : 1-2 comorbidities / personal factors will impact the outcome/ POC ; Examination LOW Complexity : 1-2 Standardized tests and measures addressing body structure, function, activity limitation and / or participation in recreation  ;Presentation LOW Complexity : Stable, uncomplicated  ;Clinical Decision Making MEDIUM Complexity : FOTO score of 26-74  Overall Complexity Rating: MEDIUM  Problem List: pain affecting function, decrease ROM, decrease strength, edema affecting function, impaired gait/ balance, decrease ADL/ functional abilitiies, decrease activity tolerance, decrease flexibility/ joint mobility, and decrease transfer abilities   Treatment Plan may include any combination of the following: Therapeutic exercise, Therapeutic activities, Neuromuscular re-education, Physical agent/modality, Gait/balance training, Manual therapy, Patient education, Self Care training, Functional mobility training, Home safety training, Stair training, and Other: HEP  Patient / Family readiness to learn indicated by: asking questions, trying to perform skills, and interest  Persons(s) to be included in education: patient (P)  Barriers to Learning/Limitations: None  Patient Goal (s): I really am feeling better  Patient Self Reported Health Status: excellent  Rehabilitation Potential: good    Short Term Goals: To be accomplished in 3 weeks:  1. Patient will be I with HEP for carryover to home management               Eval: established  2. Patient will report little to no difficulty with looking up to see  a bird. Eval: moderate difficulty  Long Term Goals: To be accomplished in 6 weeks:  1. Patient will achieve predicted FOTO score of 61 for demonstration of improved function. Eval: 57  2. Patient will report little to no difficulty with turning head quickly as with driving. Eval: moderate difficulty    Frequency / Duration: Patient to be seen 1-2 times per week for 6 weeks. Patient/ Caregiver education and instruction: Diagnosis, prognosis, self care, activity modification, and exercises   [x]  Plan of care has been reviewed with PTA    Certification Period: 9/29/22 - 10/28/22  Gretchen Carroll, PT 9/29/2022 10:01 AM    ________________________________________________________________________    I certify that the above Therapy Services are being furnished while the patient is under my care.  I agree with the treatment plan and certify that this therapy is necessary.     [de-identified] Signature:____________Date:_________TIME:________     Argelia Pappas DO  ** Signature, Date and Time must be completed for valid certification **    Please sign and return to In [address] [place] [address] Drive Square  [address] [place] [address], [address] [place] [address], [address] [place] [address],[facility] 300  [phone] fax

## 2022-09-29 NOTE — PROGRESS NOTES
PT DAILY TREATMENT NOTE     Patient Name: Yamile Rico  Date:2022  : 1945  [x]  Patient  Verified  Payor: Debra Duenas / Plan: VA MEDICARE PART A & B / Product Type: Medicare /    In time:9:52A  Out time:10:35A  Total Treatment Time (min): 43min  Visit #: 1 of 12    Medicare/BCBS Only   Total Timed Codes (min):  30 1:1 Treatment Time:  43       Treatment Area: Other low back pain [M54.59]  Neck pain [M54.2]  DDD (degenerative disc disease), cervical [M50.30]    SUBJECTIVE  Pain Level (0-10 scale): 2/10  Any medication changes, allergies to medications, adverse drug reactions, diagnosis change, or new procedure performed?: [x] No    [] Yes (see summary sheet for update)  Subjective functional status/changes:   [] No changes reported    Lives with  in one story home with 4 steps to enter with bilateral handrails. OBJECTIVE    13 min [x]Eval                  []Re-Eval       13 min Therapeutic Exercise:  [x] See flow sheet :   Rationale: increase ROM and increase strength to improve the patients ability to Tolerate basic ADLs and household-related tasks without pain. 17 min Manual Therapy:  Grade 2-3 mobs to Cervical spine levels C3-C6 into P/A, lateral side glide, lateral sidebend, and rotation   The manual therapy interventions were performed at a separate and distinct time from the therapeutic activities interventions. Rationale: decrease pain, increase ROM, increase tissue extensibility, decrease edema , correct positional vertigo, decrease trigger points, and increase postural awareness to improve overall functional rotation.      With   [x] TE   [x] TA   [x] neuro   [] other: Patient Education: [x] Review HEP    [x] Progressed/Changed HEP based on:   [x] positioning   [] body mechanics   [] transfers   [] heat/ice application    [] other:      Other Objective/Functional Measures:     Dynamometer  Strength Testing:  Right (dominant): 40lbs, 42, 41lbs force  Left: 35, 42, 39lbs force    30 sit to stand Test:  16 reps    Cervical A/ROM:  Right and left sidebend: 20 degrees  Right rotation: 60 degrees  Left rotation: 30 degrees  Extension: 30 degrees  Flexion: 30 degrees    Hypomobile into rotation throughout cervical spine    Compression/distraction test: + for decreased symptoms with traction    Pain Level (0-10 scale) post treatment: 0/10    ASSESSMENT/Changes in Function: Patient is a pleasant older adult female with sub-acute on chronic neck pain of insidious onset. Also history of lumbar fusion with no back pain until recently. Special testing today suggests pain is postural dysfunction in nature with limitation into rotation primarily, especially to the Left. Has felt much better the last week since starting a new prescribed medication. Treatment will focus on increased functional ROM with activities such as driving. Patient will continue to benefit from skilled PT services to modify and progress therapeutic interventions, address functional mobility deficits, address ROM deficits, address strength deficits, analyze and address soft tissue restrictions, analyze and cue movement patterns, analyze and modify body mechanics/ergonomics, assess and modify postural abnormalities, address imbalance/dizziness, and instruct in home and community integration to attain remaining goals. [x]  See Plan of Care  []  See progress note/recertification  []  See Discharge Summary         Progress towards goals / Updated goals:  See POC    PLAN  [x]  Upgrade activities as tolerated     [x]  Continue plan of care  []  Update interventions per flow sheet       []  Discharge due to:_  []  Other:_      Gretchen Carroll, PT 9/29/2022  10:05 AM    No future appointments.

## 2022-10-04 ENCOUNTER — HOSPITAL ENCOUNTER (OUTPATIENT)
Dept: PHYSICAL THERAPY | Age: 77
Discharge: HOME | End: 2022-10-04
Attending: FAMILY MEDICINE
Payer: MEDICARE

## 2022-10-04 PROCEDURE — 97140 MANUAL THERAPY 1/> REGIONS: CPT | Performed by: PHYSICAL THERAPIST

## 2022-10-04 PROCEDURE — 97112 NEUROMUSCULAR REEDUCATION: CPT | Performed by: PHYSICAL THERAPIST

## 2022-10-04 PROCEDURE — 97110 THERAPEUTIC EXERCISES: CPT | Performed by: PHYSICAL THERAPIST

## 2022-10-04 NOTE — PROGRESS NOTES
PT DAILY TREATMENT NOTE     Patient Name: Sweta Hong  Date:10/4/2022  : 1945  [x]  Patient  Verified  Payor: Gisele Marin / Plan: [place] PART A & B / Product Type: Medicare /    In time:10:30A  Out time:1125A  Total Treatment Time (min): 55min  Visit #: 2 of 12    Medicare/BCBS Only   Total Timed Codes (min):  45 1:1 Treatment Time:  45       Treatment Area: Other low back pain [M54.59]  Neck pain [M54.2]    SUBJECTIVE  Pain Level (0-10 scale): 0/10  Any medication changes, allergies to medications, adverse drug reactions, diagnosis change, or new procedure performed?: [x] No    [] Yes (see summary sheet for update)  Subjective functional status/changes:   [] No changes reported  Patient state she still feels pretty good and that the medication she was prescribed is what seems to be doing the \"trick. \"    OBJECTIVE    Modality rationale: decrease edema, decrease pain, and increase tissue extensibility to improve the patients ability to decrease muscle stiffness.     Min Type Additional Details    [] Estim:  []Unatt       []IFC  []Premod                        []Other:  []w/ice   []w/heat  Position:  Location:    [] Estim: []Att    []TENS instruct  []NMES                    []Other:  []w/US   []w/ice   []w/heat  Position:  Location:    []  Traction: [] Cervical       []Lumbar                       [] Prone          []Supine                       []Intermittent   []Continuous Lbs:  [] before manual  [] after manual    []  Ultrasound: []Continuous   [] Pulsed                           []1MHz   []3MHz W/cm2:  Location:    []  Iontophoresis with dexamethasone         Location: [] Take home patch   [] In clinic   10 []  Ice     [x]  heat  []  Ice massage  []  Laser   []  Anodyne Position: seated  Location:     []  Laser with stim  []  Other:  Position:  Location:    []  Vasopneumatic Device    []  Right     []  Left  Pre-treatment girth:  Post-treatment girth:  Measured at (location):  Pressure: [] lo [] med [] hi   Temperature: [] lo [] med [] hi   [] Skin assessment post-treatment:  []intact []redness- no adverse reaction    []redness - adverse reaction:     13 min Therapeutic Exercise:  [x] See flow sheet :   Rationale: increase ROM and increase strength to improve the patients ability to Tolerate basic ADLs and household-related tasks without pain. 17 min Manual Therapy:  Grade 2-3 mobs to Cervical spine levels C3-C6 into P/A, lateral side glide, lateral sidebend, and rotation   The manual therapy interventions were performed at a separate and distinct time from the therapeutic activities interventions. Rationale: decrease pain, increase ROM, increase tissue extensibility, decrease edema , correct positional vertigo, decrease trigger points, and increase postural awareness to improve overall functional rotation. 15 min Neuromuscular Re-education:  [x]  See flow sheet :   Rationale: improve coordination, improve balance, and increase proprioception  to improve the patients ability to perform activities with good form and proprioception with tactile and verbal cuing appropriately. With   [x] TE   [] TA   [x] neuro   [] other: Patient Education: [x] Review HEP    [x] Progressed/Changed HEP based on:   [x] positioning   [] body mechanics   [] transfers   [] heat/ice application    [] other:      Other Objective/Functional Measures: increased mobility with rotation to the Left after manual     Pain Level (0-10 scale) post treatment: 0/10    ASSESSMENT/Changes in Function: Patient is progressing towards goals of increased activity tolerance and decreased stiffness. Continued patient education re: balancing pain meds and movement and long term use of meds conversation to have with physician.      Patient will continue to benefit from skilled PT services to modify and progress therapeutic interventions, address functional mobility deficits, address ROM deficits, address strength deficits, analyze and address soft tissue restrictions, analyze and cue movement patterns, analyze and modify body mechanics/ergonomics, assess and modify postural abnormalities, address imbalance/dizziness, and instruct in home and community integration to attain remaining goals. [x]  See Plan of Care  []  See progress note/recertification  []  See Discharge Summary         Progress towards goals / Updated goals:  Short Term Goals: To be accomplished in 3 weeks:  1. Patient will be I with HEP for carryover to home management               Eval: established   Current: doing some somewhat consistently 10/4/22  2. Patient will report little to no difficulty with looking up to see  a bird. Eval: moderate difficulty   Current: little bit of difficulty 10/4/22 - progressing  Long Term Goals: To be accomplished in 6 weeks:  1. Patient will achieve predicted FOTO score of 61 for demonstration of improved function. Eval: 57  2. Patient will report little to no difficulty with turning head quickly as with driving.                Eval: moderate difficulty    PLAN  [x]  Upgrade activities as tolerated     []  Continue plan of care  []  Update interventions per flow sheet       []  Discharge due to:_  []  Other:_      Waldemar Joshi, PT 10/4/2022  10:55 AM    Future Appointments   Date Time Provider Hedy Hilton   10/7/2022  9:45 AM Woodroe Devoid, PTA [facility]   10/12/2022 12:00 PM Woodroe Devoid, PTA [facility]   10/14/2022  2:15 PM Tha Gee, PTA [facility]   10/18/2022  9:45 AM Woodroe Devoid, PTA [facility]   10/20/2022 12:00 PM Woodroe Devoid, PTA [facility]   10/25/2022 10:30 AM Abner Rosenbaum, PT [facility]   10/27/2022 11:15 AM Gretchen Carroll, PT [facility]

## 2022-10-07 ENCOUNTER — HOSPITAL ENCOUNTER (OUTPATIENT)
Dept: PHYSICAL THERAPY | Age: 77
Discharge: HOME | End: 2022-10-07
Attending: FAMILY MEDICINE
Payer: MEDICARE

## 2022-10-07 PROCEDURE — 97140 MANUAL THERAPY 1/> REGIONS: CPT | Performed by: PHYSICAL THERAPIST

## 2022-10-07 PROCEDURE — 97012 MECHANICAL TRACTION THERAPY: CPT | Performed by: PHYSICAL THERAPIST

## 2022-10-07 PROCEDURE — 97110 THERAPEUTIC EXERCISES: CPT | Performed by: PHYSICAL THERAPIST

## 2022-10-07 PROCEDURE — 97112 NEUROMUSCULAR REEDUCATION: CPT | Performed by: PHYSICAL THERAPIST

## 2022-10-07 NOTE — PROGRESS NOTES
PT DAILY TREATMENT NOTE     Patient Name: Julianne Witt  Date:10/7/2022  : 1945  [x]  Patient  Verified  Payor: Chelsey Marcos / Plan: [place] MEDICARE PART A & B / Product Type: Medicare /    In time:9:55A  Out time:1044A  Total Treatment Time (min): 49min  Visit #: 3 of 12    Medicare/BCBS Only   Total Timed Codes (min):  38 1:1 Treatment Time:  38       Treatment Area: Other low back pain [M54.59]  Neck pain [M54.2]    SUBJECTIVE  Pain Level (0-10 scale): 0/10 just stiffness  Any medication changes, allergies to medications, adverse drug reactions, diagnosis change, or new procedure performed?: [x] No    [] Yes (see summary sheet for update)  Subjective functional status/changes:   [] No changes reported  Patient states she has been doing the exercises at home but doesn't tell a difference. Also states she feels relief from the manual therapy for about 2 days. OBJECTIVE    Modality rationale: decrease inflammation and increase tissue extensibility to improve the patients ability to improve overall ROM and decrease stiffness.    Min Type Additional Details    [] Estim:  []Unatt       []IFC  []Premod                        []Other:  []w/ice   []w/heat  Position:  Location:    [] Estim: []Att    []TENS instruct  []NMES                    []Other:  []w/US   []w/ice   []w/heat  Position:  Location:   11 [x]  Traction: [x] Cervical       []Lumbar                       [] Prone          [x]Supine                       []Intermittent   []Continuous Lbs: 27lbs  [] before manual  [x] after manual    []  Ultrasound: []Continuous   [] Pulsed                           []1MHz   []3MHz W/cm2:  Location:    []  Iontophoresis with dexamethasone         Location: [] Take home patch   [] In clinic    []  Ice     []  heat  []  Ice massage  []  Laser   []  Anodyne Position:  Location:    []  Laser with stim  []  Other:  Position:  Location:    []  Vasopneumatic Device    []  Right     []  Left  Pre-treatment girth:  Post-treatment girth:  Measured at (location):  Pressure:       [] lo [] med [] hi   Temperature: [] lo [] med [] hi   [] Skin assessment post-treatment:  []intact []redness- no adverse reaction    []redness - adverse reaction:      13 min Therapeutic Exercise:  [x] See flow sheet :   Rationale: increase ROM and increase strength to improve the patients ability to Tolerate basic ADLs and household-related tasks without pain. 15 min Manual Therapy:  Grade 2-3 mobs to Cervical spine levels C3-C6 into P/A, lateral side glide, lateral sidebend, and rotation   The manual therapy interventions were performed at a separate and distinct time from the therapeutic activities interventions. Rationale: decrease pain, increase ROM, increase tissue extensibility, decrease edema , correct positional vertigo, decrease trigger points, and increase postural awareness to improve overall functional rotation. 10 min Neuromuscular Re-education:  [x]  See flow sheet :   Rationale: improve coordination, improve balance, and increase proprioception  to improve the patients ability to perform activities with good form and proprioception with tactile and verbal cuing appropriately. With   [x] TE   [] TA   [x] neuro   [] other: Patient Education: [x] Review HEP    [x] Progressed/Changed HEP based on:   [x] positioning   [] body mechanics   [] transfers   [] heat/ice application    [] other:       Other Objective/Functional Measures: Left cervical Rotation post manual and post mechanical traction increased to 60 degrees. Pain Level (0-10 scale) post treatment: 0/10 and looser    ASSESSMENT/Changes in Function: Patient states she is feeling better overall as she continues to take the medication prescribed. ROM continues to improve as well.  Patient education on actually perform HEP as prescribed to facilitate better rotation stretch for improved overall mobility. Patient will continue to benefit from skilled PT services to modify and progress therapeutic interventions, address functional mobility deficits, address ROM deficits, address strength deficits, analyze and address soft tissue restrictions, analyze and cue movement patterns, analyze and modify body mechanics/ergonomics, assess and modify postural abnormalities, address imbalance/dizziness, and instruct in home and community integration to attain remaining goals. [x]  See Plan of Care  []  See progress note/recertification  []  See Discharge Summary         Progress towards goals / Updated goals:  Short Term Goals: To be accomplished in 3 weeks:  1. Patient will be I with HEP for carryover to home management               Eval: established              Current: doing some somewhat consistently 10/7/22  2. Patient will report little to no difficulty with looking up to see  a bird. Eval: moderate difficulty              Current: no difficulty 10/7/22 - MET  Long Term Goals: To be accomplished in 6 weeks:  1. Patient will achieve predicted FOTO score of 61 for demonstration of improved function. Eval: 57  2. Patient will report little to no difficulty with turning head quickly as with driving.                Eval: moderate difficulty    PLAN  [x]  Upgrade activities as tolerated     []  Continue plan of care  []  Update interventions per flow sheet       []  Discharge due to:_  []  Other:_      Savage Marrero, PT 10/7/2022  9:59 AM    Future Appointments   Date Time Provider Hedy Hilton   10/12/2022 12:00 PM Ayo Paul PTA [facility]   10/14/2022  2:15 PM Angelica Schneider, PTA [facility]   10/18/2022  9:45 AM Ayo Paul PTA [facility]   10/20/2022 12:00 PM Ayo Paul PTA [facility]   10/25/2022 10:30 AM Nii Mcnair, PT [facility]   10/27/2022 11:15 AM Etta Carroll, PT [facility]   11/23/2022 10:55 AM [facility] AM[facility] 11/30/2022  9:20 AM Rey Cardoso MD [facility] BS AMB

## 2022-10-12 ENCOUNTER — HOSPITAL ENCOUNTER (OUTPATIENT)
Dept: PHYSICAL THERAPY | Age: 77
Discharge: HOME | End: 2022-10-12
Attending: FAMILY MEDICINE
Payer: MEDICARE

## 2022-10-12 PROCEDURE — 97110 THERAPEUTIC EXERCISES: CPT

## 2022-10-12 PROCEDURE — 97140 MANUAL THERAPY 1/> REGIONS: CPT

## 2022-10-12 PROCEDURE — 97012 MECHANICAL TRACTION THERAPY: CPT

## 2022-10-12 NOTE — PROGRESS NOTES
PT DAILY TREATMENT NOTE     Patient Name: Emily Rosario  Date:10/12/2022  : 1945  [x]  Patient  Verified  Payor: [address] [place] [address] Avenue / Plan: [employer] / Product Type: Commerical /    In time:1158 am   Out time:105 pm   Total Treatment Time (min): 67  Visit #: 4 of 12    Medicare/BCBS Only   Total Timed Codes (min):  57 1:1 Treatment Time:  47       Treatment Area: Other low back pain [M54.59]  Neck pain [M54.2]    SUBJECTIVE  Pain Level (0-10 scale): 0/10   Any medication changes, allergies to medications, adverse drug reactions, diagnosis change, or new procedure performed?: [x] No    [] Yes (see summary sheet for update)  Subjective functional status/changes:   [] No changes reported  Patient reports increased tightness in her left shoulder and neck. OBJECTIVE    Modality rationale: decrease inflammation, decrease pain, and increase tissue extensibility to improve the patients ability to assist with performing ADL's and functional tasks without limitations.     Min Type Additional Details    [] Estim:  []Unatt       []IFC  []Premod                        []Other:  []w/ice   []w/heat  Position:  Location:    [] Estim: []Att    []TENS instruct  []NMES                    []Other:  []w/US   []w/ice   []w/heat  Position:  Location:   10 [x]  Traction: [x] Cervical       []Lumbar                       [] Prone          [x]Supine                       []Intermittent   []Continuous Lbs:27  [] before manual  [x] after manual    []  Ultrasound: []Continuous   [] Pulsed                           []1MHz   []3MHz W/cm2:  Location:    []  Iontophoresis with dexamethasone         Location: [] Take home patch   [] In clinic   10 []  Ice     [x]  heat  []  Ice massage  []  Laser   []  Anodyne Position:supine  Location:C/S    []  Laser with stim  []  Other:  Position:  Location:    []  Vasopneumatic Device    []  Right     []  Left  Pre-treatment girth:  Post-treatment girth:  Measured at (location): Pressure:       [] lo [] med [] hi   Temperature: [] lo [] med [] hi   [] Skin assessment post-treatment:  []intact []redness- no adverse reaction []redness - adverse reaction:     32 min Therapeutic Exercise:  [] See flow sheet :   Rationale: increase ROM and increase strength to improve the patients overall muscle endurance and activity tolerance for ADL's and functional tasks. 15 min Manual Therapy:  STM/DT to cervical musculature, SOR, manual UT and levator scap stretching   The manual therapy interventions were performed at a separate and distinct time from the therapeutic activities interventions. Rationale: decrease pain, increase ROM, increase tissue extensibility, decrease trigger points, and increase postural awareness to assist with performing ADL's with ease. With   [] TE   [] TA   [] neuro   [] other: Patient Education: [x] Review HEP    [] Progressed/Changed HEP based on:   [] positioning   [] body mechanics   [] transfers   [] heat/ice application    [] other:      Other Objective/Functional Measures:      Pain Level (0-10 scale) post treatment: 0/10     ASSESSMENT/Changes in Function:   Patient is progressing as expected towards goals. Patient performed exercises, as per flow sheet, to assist with increasing cervical ROM. Patient tolerated all exercises with no increase in pain. Muscle spas ms were more prominent on the left when compared to the right. Muscle spasms decreased following manual therapy. Will continue to progress as able. Patient will continue to benefit from skilled PT services to modify and progress therapeutic interventions, address functional mobility deficits, address ROM deficits, address strength deficits, analyze and address soft tissue restrictions, analyze and modify body mechanics/ergonomics, assess and modify postural abnormalities, and instruct in home and community integration to attain remaining goals.      []  See Plan of Care  []  See progress note/recertification  []  See Discharge Summary         Progress towards goals / Updated goals:  Short Term Goals: To be accomplished in 3 weeks:  1. Patient will be I with HEP for carryover to home management               Eval: established              Current: doing some somewhat consistently 10/12/22  2. Patient will report little to no difficulty with looking up to see  a bird. Eval: moderate difficulty              Current: no difficulty 10/7/22 - MET  Long Term Goals: To be accomplished in 6 weeks:  1. Patient will achieve predicted FOTO score of 61 for demonstration of improved function. Eval: 62   Current:Ongoing, will assess at next progress note. 10/12/2022  2. Patient will report little to no difficulty with turning head quickly as with driving.                Eval: moderate difficulty     PLAN  [x]  Upgrade activities as tolerated     [x]  Continue plan of care  []  Update interventions per flow sheet       []  Discharge due to:_  []  Other:_      Tatianna Lu PTA 10/12/2022  12:56 PM    Future Appointments   Date Time Provider Hedy Hilton   10/14/2022  2:15 PM Ruam Thomas PTA [facility]   10/18/2022  9:45 AM Sparkle Holder, PTA [facility]   10/20/2022 12:00 PM Sparkle Holder, PTA [facility]   10/25/2022 10:30 AM Connie Huffman, PT [facility]   10/27/2022 11:15 AM Sujatha Carroll, PT [facility]   11/23/2022 10:55 AM [facility] V[facility] AM[facility]   11/30/2022  9:20 AM Jesusita Cardoso MD [facility] AM[facility]

## 2022-10-14 ENCOUNTER — HOSPITAL ENCOUNTER (OUTPATIENT)
Dept: PHYSICAL THERAPY | Age: 77
Discharge: HOME | End: 2022-10-14
Attending: FAMILY MEDICINE
Payer: MEDICARE

## 2022-10-14 PROCEDURE — 97140 MANUAL THERAPY 1/> REGIONS: CPT

## 2022-10-14 PROCEDURE — 97110 THERAPEUTIC EXERCISES: CPT

## 2022-10-14 PROCEDURE — 97012 MECHANICAL TRACTION THERAPY: CPT

## 2022-10-14 NOTE — PROGRESS NOTES
PT DAILY TREATMENT NOTE     Patient Name: Isaías Burch  Date:10/14/2022  : 1945  [x]  Patient  Verified  Payor: [employer] / Plan: [facility] MEDICARE RAILROAD / Product Type: Medicare /    In time:2:20  Out time:3:20  Total Treatment Time (min): 60  Visit #: 5 of 12    Medicare/BCBS Only   Total Timed Codes (min):  44 1:1 Treatment Time:  44       Treatment Area: Other low back pain [M54.59]  Neck pain [M54.2]    SUBJECTIVE  Pain Level (0-10 scale): 0  Any medication changes, allergies to medications, adverse drug reactions, diagnosis change, or new procedure performed?: [x] No    [] Yes (see summary sheet for update)  Subjective functional status/changes:   [] No changes reported  \"My neck is feeling good. \"    OBJECTIVE    Modality rationale: decrease pain and increase tissue extensibility to improve the patients ability to perform ADL    Min Type Additional Details    [] Estim:  []Unatt       []IFC  []Premod                        []Other:  []w/ice   []w/heat  Position:  Location:    [] Estim: []Att    []TENS instruct  []NMES                    []Other:  []w/US   []w/ice   []w/heat  Position:  Location:   16 []  Traction: [x] Cervical       []Lumbar                       [] Prone          []Supine                       [x]Intermittent   []Continuous Lbs:27  [] before manual  [x] after manual    []  Ultrasound: []Continuous   [] Pulsed                           []1MHz   []3MHz W/cm2:  Location:    []  Iontophoresis with dexamethasone         Location: [] Take home patch   [] In clinic    []  Ice     []  heat  []  Ice massage  []  Laser   []  Anodyne Position:  Location:    []  Laser with stim  []  Other:  Position:  Location:    []  Vasopneumatic Device    []  Right     []  Left  Pre-treatment girth:  Post-treatment girth:  Measured at (location):  Pressure:       [] lo [] med [] hi   Temperature: [] lo [] med [] hi   [x] Skin assessment post-treatment:  [x]intact []redness- no adverse reaction []redness - adverse reaction:      min []Eval                  []Re-Eval       36 min Therapeutic Exercise:  [x] See flow sheet :   Rationale: increase ROM and increase strength to improve the patients ability to perform ADL      min Therapeutic Activity:  [x]  See flow sheet :   Rationale: increase ROM, increase strength, and improve coordination  to improve the patients ability to look over shoulder to drive with ease      min Neuromuscular Re-education:  []  See flow sheet :   Rationale: increase ROM, increase strength, improve coordination, improve balance, and increase proprioception  to improve the patients ability to perform ADL     8 min Manual Therapy:  STM/DTM (B) UT supine   The manual therapy interventions were performed at a separate and distinct time from the therapeutic activities interventions. Rationale: decrease pain, increase ROM, increase tissue extensibility, decrease edema , decrease trigger points, and increase postural awareness to perform ADL      min Gait Training:  ___ feet with ___ device on level surfaces with ___ level of assist   Rationale:     min Self Care/Home Management:    Rationale: increase ROM, increase strength, and improve coordination  to improve the patients ability to household chores          With   [] TE   [] TA   [] neuro   [] other: Patient Education: [x] Review HEP    [] Progressed/Changed HEP based on:   [] positioning   [] body mechanics   [] transfers   [] heat/ice application    [] other:      Other Objective/Functional Measures:      Pain Level (0-10 scale) post treatment: 0    ASSESSMENT/Changes in Function: Today's session focused on pain reduction and increased in CSP AROM. Pt presents with knots at (B) traps during manual. Pt completed each CSP AROM and levator stretch with no difficulty or increased pain. Pt benefited with treatment due to no pain and increased in CSP AROM. Pt is progressing towards all goals.     Patient will continue to benefit from skilled PT services to modify and progress therapeutic interventions, address functional mobility deficits, address ROM deficits, address strength deficits, analyze and address soft tissue restrictions, analyze and cue movement patterns, analyze and modify body mechanics/ergonomics, assess and modify postural abnormalities, and instruct in home and community integration to attain remaining goals. [x]  See Plan of Care  []  See progress note/recertification  []  See Discharge Summary         Progress towards goals / Updated goals:  1. Patient will be I with HEP for carryover to home management               Eval: established              Current: doing some somewhat consistently 10/12/22  2. Patient will report little to no difficulty with looking up to see  a bird. Eval: moderate difficulty              Current: no difficulty 10/7/22 - MET  Long Term Goals: To be accomplished in 6 weeks:  1. Patient will achieve predicted FOTO score of 61 for demonstration of improved function. Eval: 62              Current:Ongoing, will assess at next progress note. 10/12/2022  2. Patient will report little to no difficulty with turning head quickly as with driving.                Eval: moderate difficulty    PLAN  []  Upgrade activities as tolerated     [x]  Continue plan of care  []  Update interventions per flow sheet       []  Discharge due to:_  []  Other:_      Mary Paredes, PTA 10/14/2022  2:29 PM    Future Appointments   Date Time Provider Hedy Hilton   10/18/2022  9:45 AM Florencio Miranda PTA [facility]   10/20/2022 12:00 PM Florencio Miranda PTA [facility]   10/25/2022 10:30 AM Denisa Quach, PT [facility]   10/27/2022 11:15 AM Denisa Quach, PT [facility]   11/23/2022 10:55 AM [facility]   11/30/2022  9:20 AM Sravani Cardoso Junior, MD [facility] AM[facility]

## 2022-10-18 ENCOUNTER — HOSPITAL ENCOUNTER (OUTPATIENT)
Dept: PHYSICAL THERAPY | Age: 77
Discharge: HOME | End: 2022-10-18
Attending: FAMILY MEDICINE
Payer: MEDICARE

## 2022-10-18 PROCEDURE — 97110 THERAPEUTIC EXERCISES: CPT

## 2022-10-18 PROCEDURE — 97012 MECHANICAL TRACTION THERAPY: CPT

## 2022-10-18 PROCEDURE — 97140 MANUAL THERAPY 1/> REGIONS: CPT

## 2022-10-18 NOTE — PROGRESS NOTES
PT DAILY TREATMENT NOTE     Patient Name: Simona Galeana  Date:10/18/2022  : 1945  [x]  Patient  Verified  Payor: MEDICARE RAILROAD / Plan: Prachi Narayanan 12 / Product Type: Medicare /    In time: 945 am   Out time:1032 am    Total Treatment Time (min): 47   Visit #: 6 of 12    Medicare/BCBS Only   Total Timed Codes (min):  47 1:1 Treatment Time:  47       Treatment Area: Other low back pain [M54.59]  Neck pain [M54.2]    SUBJECTIVE  Pain Level (0-10 scale): 0/10   Any medication changes, allergies to medications, adverse drug reactions, diagnosis change, or new procedure performed?: [x] No    [] Yes (see summary sheet for update)  Subjective functional status/changes:   [] No changes reported  Patient reports feeling better overall but still has some tightness in her neck. OBJECTIVE    Modality rationale: decrease inflammation, decrease pain, and increase tissue extensibility to improve the patients ability to assist with performing ADL's and functional tasks without limitations.     Min Type Additional Details    [] Estim:  []Unatt       []IFC  []Premod                        []Other:  []w/ice   []w/heat  Position:  Location:    [] Estim: []Att    []TENS instruct  []NMES                    []Other:  []w/US   []w/ice   []w/heat  Position:  Location:   10 [x]  Traction: [x] Cervical       []Lumbar                       [] Prone          [x]Supine                       []Intermittent   []Continuous Lbs:27  [] before manual  [x] after manual    []  Ultrasound: []Continuous   [] Pulsed                           []1MHz   []3MHz W/cm2:  Location:    []  Iontophoresis with dexamethasone         Location: [] Take home patch   [] In clinic   PD []  Ice     [x]  heat  []  Ice massage  []  Laser   []  Anodyne Position:supine  Location:C/S    []  Laser with stim  []  Other:  Position:  Location:    []  Vasopneumatic Device    []  Right     []  Left  Pre-treatment girth:  Post-treatment girth:  Measured at (location):  Pressure:       [] lo [] med [] hi   Temperature: [] lo [] med [] hi   [] Skin assessment post-treatment:  []intact []redness- no adverse reaction []redness - adverse reaction:     22 min Therapeutic Exercise:  [] See flow sheet :   Rationale: increase ROM and increase strength to improve the patients overall muscle endurance and activity tolerance for ADL's and functional tasks. 15 min Manual Therapy:  STM/DT to cervical musculature, SOR, manual UT and levator scap stretching   The manual therapy interventions were performed at a separate and distinct time from the therapeutic activities interventions. Rationale: decrease pain, increase ROM, increase tissue extensibility, decrease trigger points, and increase postural awareness to assist with performing ADL's with ease. With   [] TE   [] TA   [] neuro   [] other: Patient Education: [x] Review HEP    [] Progressed/Changed HEP based on:   [] positioning   [] body mechanics   [] transfers   [] heat/ice application    [] other:      Other Objective/Functional Measures: Added UT lift off with cervical extension     Pain Level (0-10 scale) post treatment: 0/10     ASSESSMENT/Changes in Function:   Patient is progressing as expected towards goals. Progressed exercises, as per flow sheet, to assist with improving cervical mobility. Increased soreness was experienced after today's added exercise. Right cervical rotation improved after today's exercises. Muscle spasms continue to decrease. Will continue to progress exercises as tolerated. Patient will continue to benefit from skilled PT services to modify and progress therapeutic interventions, address functional mobility deficits, address ROM deficits, address strength deficits, analyze and address soft tissue restrictions, analyze and modify body mechanics/ergonomics, assess and modify postural abnormalities, and instruct in home and community integration to attain remaining goals. []  See Plan of Care  []  See progress note/recertification  []  See Discharge Summary         Progress towards goals / Updated goals:  Short Term Goals: To be accomplished in 3 weeks:  1. Patient will be I with HEP for carryover to home management               Eval: established              Current: doing some somewhat consistently 10/18/22  2. Patient will report little to no difficulty with looking up to see  a bird. Eval: moderate difficulty              Current: no difficulty 10/7/22 - MET  Long Term Goals: To be accomplished in 6 weeks:  1. Patient will achieve predicted FOTO score of 61 for demonstration of improved function. Eval: 62   Current:Ongoing, will assess at next progress note. 10/18/2022  2. Patient will report little to no difficulty with turning head quickly as with driving.                Eval: moderate difficulty     PLAN  [x]  Upgrade activities as tolerated     [x]  Continue plan of care  []  Update interventions per flow sheet       []  Discharge due to:_  []  Other:_      Jareth England PTA 10/18/2022  12:56 PM    Future Appointments   Date Time Provider Hedy Hilton   10/20/2022 12:00 PM Romeo Ormond, PTA [facility]   10/25/2022 10:30 AM [facility]ac Welch[facility] PT [facility]   10/27/2022 11:15 AM Aiden Carroll, PT [facility]   11/23/2022 10:55 AM [facility] VISIT [facility] AM[facility]   11/30/2022  9:20 AM Bibiana Cardoso MD [facility]

## 2022-10-20 ENCOUNTER — HOSPITAL ENCOUNTER (OUTPATIENT)
Dept: PHYSICAL THERAPY | Age: 77
Discharge: HOME | End: 2022-10-20
Attending: FAMILY MEDICINE
Payer: MEDICARE

## 2022-10-20 PROCEDURE — 97140 MANUAL THERAPY 1/> REGIONS: CPT

## 2022-10-20 PROCEDURE — 97012 MECHANICAL TRACTION THERAPY: CPT

## 2022-10-20 PROCEDURE — 97110 THERAPEUTIC EXERCISES: CPT

## 2022-10-20 NOTE — PROGRESS NOTES
PT DAILY TREATMENT NOTE     Patient Name: Estelle Rabago  Date:10/20/2022  : 1945  [x]  Patient  Verified  Payor: MEDICARE RAILROAD / Plan: Annie Grovestariqdarleneashley / Product Type: Medicare /    In time: 1203 pm   Out time: 103 pm   Total Treatment Time (min): 60   Visit #: 7 of 12    Medicare/BCBS Only   Total Timed Codes (min):  60 1:1 Treatment Time: 48       Treatment Area: Other low back pain [M54.59]  Neck pain [M54.2]    SUBJECTIVE  Pain Level (0-10 scale): 0/10   Any medication changes, allergies to medications, adverse drug reactions, diagnosis change, or new procedure performed?: [x] No    [] Yes (see summary sheet for update)  Subjective functional status/changes:   [] No changes reported  Patient reports that the motion in her neck is improving. Patient states that she has tightness on the left side today. OBJECTIVE    Modality rationale: decrease inflammation, decrease pain, and increase tissue extensibility to improve the patients ability to assist with performing ADL's and functional tasks without limitations.     Min Type Additional Details    [] Estim:  []Unatt       []IFC  []Premod                        []Other:  []w/ice   []w/heat  Position:  Location:    [] Estim: []Att    []TENS instruct  []NMES                    []Other:  []w/US   []w/ice   []w/heat  Position:  Location:   12 [x]  Traction: [x] Cervical       []Lumbar                       [] Prone          [x]Supine                       []Intermittent   []Continuous Lbs:27  [] before manual  [x] after manual    []  Ultrasound: []Continuous   [] Pulsed                           []1MHz   []3MHz W/cm2:  Location:    []  Iontophoresis with dexamethasone         Location: [] Take home patch   [] In clinic   PD []  Ice     [x]  heat  []  Ice massage  []  Laser   []  Anodyne Position:supine  Location:C/S    []  Laser with stim  []  Other:  Position:  Location:    []  Vasopneumatic Device    []  Right     []  Left  Pre-treatment girth:  Post-treatment girth:  Measured at (location):  Pressure:       [] lo [] med [] hi   Temperature: [] lo [] med [] hi   [] Skin assessment post-treatment:  []intact []redness- no adverse reaction []redness - adverse reaction:     33 min Therapeutic Exercise:  [] See flow sheet :   Rationale: increase ROM and increase strength to improve the patients overall muscle endurance and activity tolerance for ADL's and functional tasks. 15 min Manual Therapy:  STM/DT to cervical musculature, SOR, manual UT and levator scap stretching   The manual therapy interventions were performed at a separate and distinct time from the therapeutic activities interventions. Rationale: decrease pain, increase ROM, increase tissue extensibility, decrease trigger points, and increase postural awareness to assist with performing ADL's with ease. With   [] TE   [] TA   [] neuro   [] other: Patient Education: [x] Review HEP    [] Progressed/Changed HEP based on:   [] positioning   [] body mechanics   [] transfers   [] heat/ice application    [] other:      Other Objective/Functional Measures:      Pain Level (0-10 scale) post treatment: 0/10     ASSESSMENT/Changes in Function:   Patient is progressing as expected towards goals. Patient performed  exercises, as per flow sheet, to assist with improving functional cervical mobility. Patient was able to perform exercises with no reports of increased cervical pain. Muscle spasms continue to decrease following manual therapy. Will continue to progress exercises as tolerated. Patient will continue to benefit from skilled PT services to modify and progress therapeutic interventions, address functional mobility deficits, address ROM deficits, address strength deficits, analyze and address soft tissue restrictions, analyze and modify body mechanics/ergonomics, assess and modify postural abnormalities, and instruct in home and community integration to attain remaining goals. []  See Plan of Care  []  See progress note/recertification  []  See Discharge Summary         Progress towards goals / Updated goals:  Short Term Goals: To be accomplished in 3 weeks:  1. Patient will be I with HEP for carryover to home management               Eval: established              Current: doing some somewhat consistently 10/20/22  2. Patient will report little to no difficulty with looking up to see  a bird. Eval: moderate difficulty              Current: no difficulty 10/7/22 - MET  Long Term Goals: To be accomplished in 6 weeks:  1. Patient will achieve predicted FOTO score of 61 for demonstration of improved function. Eval: 62   Current:Ongoing, will assess at next progress note. 10/20/2022  2. Patient will report little to no difficulty with turning head quickly as with driving. Eval: moderate difficulty   Current: Patient reports that she can see a difference in her motion when driving.  10/20/2022     PLAN  [x]  Upgrade activities as tolerated     [x]  Continue plan of care  []  Update interventions per flow sheet       []  Discharge due to:_  []  Other:_      Chinedu Silveira, CLEMENT 10/20/2022  12:56 PM    Future Appointments   Date Time Provider Hedy Hilton   10/25/2022 10:30 AM Denise Bianchi, PT [facility]   10/27/2022 11:15 AM Baron iBb Carroll, PT [facility]   11/23/2022 10:55 AM [facility] V[facility]IT [facility] AM[facility]   11/30/2022  9:20 AM Cecilio Cardoso MD [facility]

## 2022-10-25 ENCOUNTER — HOSPITAL ENCOUNTER (OUTPATIENT)
Dept: PHYSICAL THERAPY | Age: 77
Discharge: HOME | End: 2022-10-25
Attending: FAMILY MEDICINE
Payer: MEDICARE

## 2022-10-25 PROCEDURE — 97140 MANUAL THERAPY 1/> REGIONS: CPT

## 2022-10-25 PROCEDURE — 97012 MECHANICAL TRACTION THERAPY: CPT

## 2022-10-25 PROCEDURE — 97110 THERAPEUTIC EXERCISES: CPT

## 2022-10-25 NOTE — PROGRESS NOTES
PT DAILY TREATMENT NOTE     Patient Name: Michelle Wilson  Date:10/25/2022  : 1945  [x]  Patient  Verified  Payor: MEDICARE RAILROAD / Plan: Feng Dougherty / Product Type: Medicare /    In AMKC:0624  Out time:1121  Total Treatment Time (min): 49  Visit #: 8 of 12    Medicare/BCBS Only   Total Timed Codes (min):  35 1:1 Treatment Time:  35       Treatment Area: Other low back pain [M54.59]  Neck pain [M54.2]    SUBJECTIVE  Pain Level (0-10 scale): 0  Any medication changes, allergies to medications, adverse drug reactions, diagnosis change, or new procedure performed?: [x] No    [] Yes (see summary sheet for update)  Subjective functional status/changes:   [] No changes reported  \"The traction does help.  \"    OBJECTIVE    Modality rationale: decrease pain and increase tissue extensibility to improve the patients ability to tolerate ADL and activities   Min Type Additional Details    [] Estim:  []Unatt       []IFC  []Premod                        []Other:  []w/ice   []w/heat  Position:  Location:    [] Estim: []Att    []TENS instruct  []NMES                    []Other:  []w/US   []w/ice   []w/heat  Position:  Location:   14 [x]  Traction: [x] Cervical       []Lumbar                       [] Prone          [x]Supine                       [x]Intermittent   []Continuous Lbs:27 max, 10 min  [] before manual  [x] after manual    []  Ultrasound: []Continuous   [] Pulsed                           []1MHz   []3MHz W/cm2:  Location:    []  Iontophoresis with dexamethasone         Location: [] Take home patch   [] In clinic    []  Ice     []  heat  []  Ice massage  []  Laser   []  Anodyne Position:  Location:    []  Laser with stim  []  Other:  Position:  Location:    []  Vasopneumatic Device    []  Right     []  Left  Pre-treatment girth:  Post-treatment girth:  Measured at (location):  Pressure:       [] lo [] med [] hi   Temperature: [] lo [] med [] hi   [] Skin assessment post-treatment:  []intact []redness- no adverse reaction    []redness - adverse reaction       22 min Therapeutic Exercise:  [x] See flow sheet :   Rationale: increase ROM, increase strength, and improve coordination to improve the patients ability to tolerate ADLS and activities            13 min Manual Therapy:    in supine STM/DTM to cervical musculature, SOR,  TPR B UT     The manual therapy interventions were performed at a separate and distinct time from the therapeutic activities interventions. Rationale: decrease pain, increase ROM, increase tissue extensibility, and decrease trigger points to tolerate ADLS and activities   to improve the patients ability to tolerate ADLS and activities          With   [] TE   [] TA   [] neuro   [] other: Patient Education: [x] Review HEP    [] Progressed/Changed HEP based on:   [] positioning   [] body mechanics   [] transfers   [] heat/ice application    [] other:      Other Objective/Functional Measures: VC exercises and technique      Pain Level (0-10 scale) post treatment: 0    ASSESSMENT/Changes in Function: patient reports benefit of PT and mechanical traction. B UT trigger points palpated. VC for technique with Levator scap stretch in standing. Patient will continue to benefit from skilled PT services to modify and progress therapeutic interventions, address ROM deficits, address strength deficits, analyze and address soft tissue restrictions, analyze and cue movement patterns, analyze and modify body mechanics/ergonomics, assess and modify postural abnormalities, and instruct in home and community integration to attain remaining goals. [x]  See Plan of Care  []  See progress note/recertification  []  See Discharge Summary         Progress towards goals / Updated goals:  Short Term Goals: To be accomplished in 3 weeks:  1. Patient will be I with HEP for carryover to home management               Eval: established              Current: doing some somewhat consistently 10/25/22  2. Patient will report little to no difficulty with looking up to see  a bird. Eval: moderate difficulty              Current: no difficulty 10/7/22 - MET  Long Term Goals: To be accomplished in 6 weeks:  1. Patient will achieve predicted FOTO score of 61 for demonstration of improved function. Eval: 62              Current:Ongoing, will assess at next progress note. 10/25/2022  2. Patient will report little to no difficulty with turning head quickly as with driving. Eval: moderate difficulty              Current: Patient reports that she can see a difference in her motion when driving.  10/20/2022  ongoing NA 10/25/22       PLAN  [x]  Upgrade activities as tolerated     [x]  Continue plan of care  []  Update interventions per flow sheet       []  Discharge due to:_  [x]  Other:_  MD note due next scheduled session    Torin Martinez PT 10/25/2022  10:20 AM    Future Appointments   Date Time Provider Hedy Hilton   10/25/2022 10:30 AM Saundra Mcmahan PT [facility]   10/27/2022 11:15 AM Paul Butt PTA [facility]   11/23/2022 10:55 AM [facility] V[facility]IT [facility]   11/30/2022  9:20 AM Earnstine Severs, MD [facility]

## 2022-10-27 ENCOUNTER — HOSPITAL ENCOUNTER (OUTPATIENT)
Dept: PHYSICAL THERAPY | Age: 77
Discharge: HOME | End: 2022-10-27
Attending: FAMILY MEDICINE
Payer: MEDICARE

## 2022-10-27 PROCEDURE — 97140 MANUAL THERAPY 1/> REGIONS: CPT

## 2022-10-27 PROCEDURE — 97012 MECHANICAL TRACTION THERAPY: CPT

## 2022-10-27 PROCEDURE — 97110 THERAPEUTIC EXERCISES: CPT

## 2022-10-27 PROCEDURE — 97530 THERAPEUTIC ACTIVITIES: CPT

## 2022-10-27 NOTE — PROGRESS NOTES
PT DISCHARGE DAILY NOTE AND [id number]    Date:10/27/2022     Patient name: Fabrice Lu Start of Care: 2022   Referral source: Gloria Blackmon DO : 1945               Medical Diagnosis: Other low back pain [M54.59]  Neck pain [M54.2]  DDD (degenerative disc disease), cervical [M50.30]  Payor: [place] [employer] / Plan: [place] MED[place]ARE PART A & B / Product Type: Medicare /  Onset Date:2022               Treatment Diagnosis: low back pain, neck pain   Prior Hospitalization: see medical history Provider#: [id number]   Medications: Verified on Patient summary List    Comorbidities: Patient reports: arthritis, back pain, headaches, high blood pressure   Prior Level of Function: I with ADLs, driving, housework, line dancing 1x/week without issues. Visits from Start of Care: 9 Missed Visits: 0    Reporting Period : 2022 to 10/27/2022    [x]  Patient  Verified  Payor: MEDICARE RAILROAD / Plan: [place] / Product Type: Medicare /    In time:1114 am   Out time:1210 pm   Total Treatment Time (min): 56  Visit #: 9 of 12    Medicare/BCBS Only   Total Timed Codes (min):  56 1:1 Treatment Time:  46       SUBJECTIVE  Pain Level (0-10 scale): 0/10   Any medication changes, allergies to medications, adverse drug reactions, diagnosis change, or new procedure performed?: [x] No    [] Yes (see summary sheet for update)  Subjective functional status/changes:   [] No changes reported  Patient reports feeling a lot better since starting PT. Patient stated that she can now lean her head back and take a pill with no problem. OBJECTIVE    Modality rationale: decrease pain, increase tissue extensibility, and increase muscle contraction/control to improve the patients ability to assist with performing ADL's and functional tasks without limitations.     Min Type Additional Details    [] Estim:  []Unatt       []IFC  []Premod                        []Other:  []w/ice []w/heat  Position:  Location:    [] Estim: []Att    []TENS instruct  []NMES                    []Other:  []w/US   []w/ice   []w/heat  Position:  Location:   10 []  Traction: [x] Cervical       [x]Lumbar                       [] Prone          []Supine                       []Intermittent   []Continuous Lbs:27  [] before manual  [x] after manual    []  Ultrasound: []Continuous   [] Pulsed                           []1MHz   []3MHz W/cm2:  Location:    []  Iontophoresis with dexamethasone         Location: [] Take home patch   [] In clinic    []  Ice     []  heat  []  Ice massage  []  Laser   []  Anodyne Position:  Location:    []  Laser with stim  []  Other:  Position:  Location:    []  Vasopneumatic Device    []  Right     []  Left  Pre-treatment girth:  Post-treatment girth:  Measured at (location):  Pressure:       [] lo [] med [] hi   Temperature: [] lo [] med [] hi   [] Skin assessment post-treatment:  []intact []redness- no adverse reaction  []redness - adverse reaction:     10 min Therapeutic Exercise:  [] See flow sheet :   Rationale: increase ROM and increase strength to improve the patients overall muscle endurance and activity tolerance for ADL's and functional tasks. 21 min Therapeutic Activity:  []  See flow sheet :   Rationale: increase strength and improve coordination  to improve the patients ability to safely perform dynamic activities and to improve functional performance of  ADL's. 15 min Manual Therapy:   STM/DT to cervical musculature, SOR, manual UT and levator scap stretching   Rationale: decrease pain, increase ROM, increase tissue extensibility, decrease trigger points, and increase postural awareness to assist with performing ADL's with ease.        With   [] TE   [] TA   [] neuro   [] other: Patient Education: [x] Review HEP    [] Progressed/Changed HEP based on:   [] positioning   [] body mechanics   [] transfers   [] heat/ice application    [] other:      Other Objective/Functional Measures:      Pain Level (0-10 scale) post treatment: 0/10     Summary of Care:  Goal:Patient will be I with HEP for carryover to home management  Status at last note/certification:established  Status at discharge: Patient reports compliance 1-2 times a day. MET     Goal:Patient will report little to no difficulty with looking up to see  a bird. Status at last note/certification:   Eval: moderate difficulty  Status at discharge: Patient reports no difficulty looking up to see a bird. MET     Goal:Patient will achieve predicted FOTO score of 61 for demonstration of improved function  Status at last note/certification:   Eval: 57  Status at discharge: 82 points. MET     Goal:Patient will report little to no difficulty with turning head quickly as with driving. Status at last note/certification:Eval: moderate difficulty  Status at discharge: Patient reports very slight difficulty turning head quickly with driving. MET     ASSESSMENT/Changes in Function:   Patient has attended 9 PT sessions. Patient has progressed well towards long goals. Patient is able to perform all functional cervical motions with no increase in pain. Patient is complaint with HEP and to be discharged today.      Thank you for this referral!     PLAN  [x]Discontinue therapy: [x]Patient has reached or is progressing toward set goals      []Patient is non-compliant or has abdicated      []Due to lack of appreciable progress towards set goals    Tatianna Lu, CLEMENT 10/27/2022  8:38 AM

## 2022-11-14 RX ORDER — ATORVASTATIN CALCIUM 10 MG/1
10 TABLET, FILM COATED ORAL DAILY
Qty: 90 TABLET | Refills: 3 | Status: SHIPPED | OUTPATIENT
Start: 2022-11-14

## 2022-11-14 NOTE — TELEPHONE ENCOUNTER
Requested Prescriptions     Pending Prescriptions Disp Refills    atorvastatin (LIPITOR) 10 mg tablet 90 Tablet 3     Si Tablet daily.

## 2022-11-22 ENCOUNTER — FOLLOW UP (OUTPATIENT)
Dept: URBAN - METROPOLITAN AREA CLINIC 1 | Facility: CLINIC | Age: 77
End: 2022-11-22

## 2022-11-22 DIAGNOSIS — H16.143: ICD-10-CM

## 2022-11-22 DIAGNOSIS — H04.123: ICD-10-CM

## 2022-11-22 PROCEDURE — 99213 OFFICE O/P EST LOW 20 MIN: CPT

## 2022-11-22 ASSESSMENT — KERATOMETRY
OD_K2POWER_DIOPTERS: 44.50
OS_K2POWER_DIOPTERS: 44.00
OD_AXISANGLE2_DEGREES: 094
OS_AXISANGLE_DEGREES: 170
OD_AXISANGLE_DEGREES: 4
OS_K1POWER_DIOPTERS: 43.25
OS_AXISANGLE2_DEGREES: 080
OD_K1POWER_DIOPTERS: 43.25

## 2022-11-22 ASSESSMENT — TONOMETRY
OD_IOP_MMHG: 11
OS_IOP_MMHG: 11

## 2022-11-22 ASSESSMENT — VISUAL ACUITY
OD_SC: J10
OS_SC: 20/150-1
OS_SC: J1
OD_SC: 20/20-2

## 2022-11-22 NOTE — PATIENT DISCUSSION
(CD 0.80 OU) IOP stable OU. Patient is considered high risk. Condition was discussed with patient and patient understands. Will continue to monitor patient for any progression in condition. Patient was advised to call us with any problems, questions, or concerns.

## 2022-11-23 ENCOUNTER — APPOINTMENT (OUTPATIENT)
Dept: INTERNAL MEDICINE CLINIC | Age: 77
End: 2022-11-23

## 2022-11-23 ENCOUNTER — HOSPITAL ENCOUNTER (OUTPATIENT)
Dept: LAB | Age: 77
Discharge: HOME | End: 2022-11-23
Payer: COMMERCIAL

## 2022-11-23 DIAGNOSIS — R79.89 ELEVATED SERUM CREATININE: ICD-10-CM

## 2022-11-23 DIAGNOSIS — I10 PRIMARY HYPERTENSION: ICD-10-CM

## 2022-11-23 LAB
ANION GAP SERPL CALC-SCNC: 6 MMOL/L (ref 3–18)
APPEARANCE UR: CLEAR
BILIRUB UR QL: NEGATIVE
BUN SERPL-MCNC: 24 MG/DL (ref 7–18)
BUN/CREAT SERPL: 23 (ref 12–20)
CALCIUM SERPL-MCNC: 8.8 MG/DL (ref 8.5–10.1)
CHLORIDE SERPL-SCNC: 108 MMOL/L (ref 100–111)
CO2 SERPL-SCNC: 27 MMOL/L (ref 21–32)
COLOR UR: YELLOW
CREAT SERPL-MCNC: 1.05 MG/DL (ref 0.6–1.3)
GLUCOSE SERPL-MCNC: 84 MG/DL (ref 74–99)
GLUCOSE UR STRIP.AUTO-MCNC: NEGATIVE MG/DL
HGB UR QL STRIP: NEGATIVE
KETONES UR QL STRIP.AUTO: ABNORMAL MG/DL
LEUKOCYTE ESTERASE UR QL STRIP.AUTO: NEGATIVE
NITRITE UR QL STRIP.AUTO: NEGATIVE
PH UR STRIP: 5.5 [PH] (ref 5–8)
POTASSIUM SERPL-SCNC: 4.1 MMOL/L (ref 3.5–5.5)
PROT UR STRIP-MCNC: NEGATIVE MG/DL
SODIUM SERPL-SCNC: 141 MMOL/L (ref 136–145)
SP GR UR REFRACTOMETRY: 1.02 (ref 1–1.03)
UROBILINOGEN UR QL STRIP.AUTO: 0.2 EU/DL (ref 0.2–1)

## 2022-11-23 PROCEDURE — 36415 COLL VENOUS BLD VENIPUNCTURE: CPT

## 2022-11-23 PROCEDURE — 80048 BASIC METABOLIC PNL TOTAL CA: CPT

## 2022-11-23 PROCEDURE — 81003 URINALYSIS AUTO W/O SCOPE: CPT

## 2022-11-27 NOTE — PROGRESS NOTES
68 y.o. WHITE/NON- female who presents for evaluation. At the last visit, we changed her from diuretic to amlodipine. BP much better controlled, she's had improvement in renal parameters. She has been having a chest discomfort that radiates to the back the last few months. Not exertional, no associated n/v/sweats, can last for hours. She tried xanax, tums, gas-x and no help; has not tried acid blocker. Reports occ palpitations but no syncope, presyncope. She does not exercise much mainly due to motivational issues    No gu complaints    LAST MEDICARE WELLNESS EXAM: 8/13/14, 1/19/16, 3/7/17, 11/6/20, 1/24/22    Past Medical History:   Diagnosis Date    Allergic rhinitis     Anemia     lifelong;  low fe 9/22, saw Dr Lauren Duenas     CAD (coronary artery disease)     BICA<50% (2/11); BICA<50% (9/14)    Cervical spondylosis     Chronic constipation     Colon cancer ([facility] [address] 1989    s/p partial colon resection    Colon polyp 09/06/2019    Dr Rutledge Poag    FHx: heart disease     GERD (gastroesophageal reflux disease)     H/O cardiovascular stress test 01/29/2009    neg, EF 78%, no wma     Hyperlipidemia     Hypertension     Insomnia 11/01/2016    Lumbar spinal stenosis     s/p decomp Dr Alena Sullivan    Microscopic hematuria     neg cysto Dr Margarita Hauser 2013    Osteopenia     DEXA t score -0.6 spine, -1.9 hip (1/12); -0.6 spine, -1.8 hip (5/14); -1.9 spine, -2.2 hpi FRAX 12/2.8 (11/20)    Vascular abnormality     RABI 1.21, LABI 1.22 (2.15)     Past Surgical History:   Procedure Laterality Date    COLONOSCOPY N/A 06/03/2016    neg (11/11);  Dr Kenna Porter (6/3/16) neg; Dr Sonia Guajardo (9/6/19) polyp    EGD  12/20/2021         HX BUNIONECTOMY Bilateral     HX HEMORRHOIDECTOMY      HX HYSTERECTOMY      HX LUMBAR FUSION  07/2015    Dr Alena Sullivan L4-5 decomp and fusion     HX PARTIAL COLECTOMY      HX TONSILLECTOMY      MA CYSTOURETHROSCOPY  06/2013    Dr Velinda Barges neg    Delmon Annas LEVEL      L4 and L5     Social History     Socioeconomic History    Marital status:      Spouse name: Not on file    Number of children: 2    Years of education: Not on file    Highest education level: Not on file   Occupational History    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   Tobacco Use    Smoking status: Never    Smokeless tobacco: Never   Vaping Use    Vaping Use: Never used   Substance and Sexual Activity    Alcohol use: Yes     Comment: socially    Drug use: Never    Sexual activity: Not Currently   Other Topics Concern    Not on file   Social History Narrative    ** Merged History Encounter **          Social Determinants of Health     Financial Resource Strain: Not on file   Food Insecurity: Not on file   Transportation Needs: Not on file   Physical Activity: Not on file   Stress: Not on file   Social Connections: Not on file   Intimate Partner Violence: Not on file   Housing Stability: Not on file     Current Outpatient Medications   Medication Sig    omeprazole (PRILOSEC) 40 mg capsule Take 1 Capsule by mouth daily. atorvastatin (LIPITOR) 10 mg tablet Take 1 Tablet by mouth daily. lisinopriL (PRINIVIL, ZESTRIL) 20 mg tablet Take  by mouth daily. calcium-cholecalciferol, d3, (CALCIUM 600 + D) 600-125 mg-unit tab Take  by mouth.    multivitamin (ONE A DAY) tablet Take 1 Tablet by mouth daily. ipratropium (ATROVENT) 21 mcg (0.03 %) nasal spray USE 2 SPRAYS IN EACH NOSTRIL EVERY 12 HOURS.    amLODIPine (NORVASC) 2.5 mg tablet Take 1 Tablet by mouth in the morning. ammonium lactate (LAC-HYDRIN) 12 % lotion     ALPRAZolam (Xanax) 0.25 mg tablet Take 1 Tablet by mouth three (3) times daily as needed for Anxiety. Max Daily Amount: 0.75 mg.    polyethylene glycol (MIRALAX) 17 gram/dose powder Take 17 g by mouth daily. (Patient taking differently: Take 17 g by mouth daily as needed.)    aspirin delayed-release 81 mg tablet Take 81 mg by mouth daily.     MULTIVITS,CA,MINERALS/IRON/FA (MULTI FOR HER PO) Take 1 Tab by mouth daily. calcium-cholecalciferol, d3, 600-125 mg-unit tab Take 1 Tab by mouth daily. No current facility-administered medications for this visit. No Known Allergies    REVIEW OF SYSTEMS: colo 9/19 Dr Nicolas Worrell, [address] [facility] 7/20. DEXA 5/14  Ophtho - no vision change or eye pain  Oral - no mouth pain, tongue or tooth problems  Ears - no hearing loss, ear pain, fullness, no swallowing problems  Cardiac - no CP, PND, orthopnea, edema, palpitations or syncope  Chest - no breast masses  Resp - no wheezing, chronic coughing, dyspnea  GI - no heartburn, nausea, vomiting, change in bowel habits, bleeding, hemorrhoids  Urinary - no dysuria, hematuria, flank pain, urgency, frequency    Visit Vitals  /60   Pulse 70   Temp 97 °F (36.1 °C) (Temporal)   Resp 18   Ht 5' 1\" (1.549 m)   Wt 116 lb (52.6 kg)   SpO2 100%   BMI 21.92 kg/m²     A&O x3  Affect is appropriate. Mood stable  No apparent distress  Anicteric, no JVD, adenopathy or thyromegaly. No carotid bruits or radiated murmur  Lungs clear to auscultation, no wheezes or rales  Heart showed regular rate and rhythm. No murmur, rubs, gallops  Abdomen soft nontender, no hepatosplenomegaly or masses. Extremities without edema.   Pulses 1-2+ symmetrically    LABS  From 3/15 showed   gluc 81, cr 0.82, gfr>60, alt 8,   chol 175, tg 50, hdl 77, ldl-c 88,   wbc 5.0, hb 11.8, plt 194, tsh 3.54, vit d 40.2  From 9/15 showed                            uric 4.6  From 6/16 showed   gluc 81, cr 0.72, gfr>60, alt 16, chol 190, tg 98, hdl 67, ldl-c 103, wbc 5.8, hb 11.7, plt 236, tsh 2.24  From 6/17 showed   gluc 84, cr 0.94, gfr 59,  alt 25, chol 192, tg 66, hdl 75, ldl-c 104, wbc 5.3, hb 11.3, plt 201, tsh 1.30, vit d 35.1  From 3/18 showed   gluc 72, cr 0.94, gfr 58,  alt 30,           wbc 5.3, hb 11.2, plt 253, tsh 1.20, ft4 1.10  Form 4/19 showed   gluc 90, cr 0.84, gfr>60, alt 26, chol 181, tg 49, hdl 98, ldl-c 73,   wbc 3.9, hb 11.7, plt 217, tsh 0.96, ft4 1.10  From 9/19 showed   gluc 86, cr 0.90, gfr>60,            wbc 4.7, hb 11.9, plt 222  From 4/20 showed   gluc 82, cr 0.95, gfr 57,  alt 23, chol 187, tg 91, hdl 74, ldl-c 95,   wbc 4.4, hb 11.5, plt 221  From 11/20 showed gluc 81, cr 0.88, gfr>60, alt 28, chol 172, tg 86, hdl 71, ldl-c 84,   wbc 4.7, hb 11.3, plt 204  From 3/21 showed   gluc 80, cr 1.04, gfr 52  From 9/21 showed   gluc 85, cr 0.95, gfr>60, alt 25, chol 184, tg 65, hdl 74, ldl-c 97,   wbc 5.7, hb 9.8,   plt 220, tsh 1.02, ft4 0.90, fe 58, %sat 17, ferritin 28, b12 290, fol>20, spep neg, retic 1.7  From 7/22 showed   gluc 72, cr 1.17, gfr 45,  alt 22, chol 188, tg 77, hd 74, ldl-c 99,    wbc 5.9, hb 10.8, plt 208, tsh 1.67    Results for orders placed or performed during the hospital encounter of 06/60/72   METABOLIC PANEL, BASIC   Result Value Ref Range    Sodium 141 136 - 145 mmol/L    Potassium 4.1 3.5 - 5.5 mmol/L    Chloride 108 100 - 111 mmol/L    CO2 27 21 - 32 mmol/L    Anion gap 6 3.0 - 18 mmol/L    Glucose 84 74 - 99 mg/dL    BUN 24 (H) 7.0 - 18 MG/DL    Creatinine 1.05 0.6 - 1.3 MG/DL    BUN/Creatinine ratio 23 (H) 12 - 20      eGFR 55 (L) >60 ml/min/1.73m2    Calcium 8.8 8.5 - 10.1 MG/DL   URINALYSIS W/ RFLX MICROSCOPIC   Result Value Ref Range    Color YELLOW      Appearance CLEAR      Specific gravity 1.023 1.005 - 1.030      pH (UA) 5.5 5.0 - 8.0      Protein Negative NEG mg/dL    Glucose Negative NEG mg/dL    Ketone TRACE (A) NEG mg/dL    Bilirubin Negative NEG      Blood Negative NEG      Urobilinogen 0.2 0.2 - 1.0 EU/dL    Nitrites Negative NEG      Leukocyte Esterase Negative NEG         We reviewed the patient's labs from the last several visits to point out trends in the numbers    EKG as read by me showed nsr rate 65, no acute changes, nl int      Patient Active Problem List   Diagnosis Code    Osteopenia M85.80    GERD (gastroesophageal reflux disease) K21.9    Lumbar spinal stenosis M48.061    Advance directive on file Z78.9    Primary hypertension I10    Hyperlipidemia E78.5    Allergic rhinitis J30.9    Anxiety F41.9    History of colon cancer Z85.038    Chronic anemia D64.9     Assessment and plan:  1. Osteopenia. Weight bearing exercise, Ca/d  2. GERD. PPI and avoidance measures  3. Renal.  Continue to follow   4. HTN. Controlled on current  5. HLP. Continue statin and FO  6. Anxiety. Continue current regimen. 7.  H/o colon ca. Surveillance per GI  8. Anemia. fe supp  9. Atypical cp. Sched NST and go from there        RTC 5/23    Above conditions discussed at length and patient vocalized understanding. All questions answered to patient satisfaction            ICD-10-CM ICD-9-CM    1. Atypical chest pain  R07.89 786.59 AMB POC EKG ROUTINE W/ 12 LEADS, INTER & REP      NUCLEAR CARDIAC STRESS TEST      2. Anxiety  F41.9 300.00       3. Primary hypertension  I10 401.9       4. Gastroesophageal reflux disease without esophagitis  K21.9 530.81 omeprazole (PRILOSEC) 40 mg capsule      5. Hyperlipidemia, unspecified hyperlipidemia type  Y50.8 268.0 METABOLIC PANEL, COMPREHENSIVE      LIPID PANEL      6.  Chronic anemia  D64.9 285.9 CBC W/O DIFF

## 2022-11-28 NOTE — PROGRESS NOTES
Leela Sahu presents today for   Chief Complaint   Patient presents with    Follow-up     Primary HTN   Chronic anemia   Hyperlipidemia   Elevated serum creatine    Labs     11-23-22       1. \"Have you been to the ER, urgent care clinic since your last visit? Hospitalized since your last visit? \" no    2. \"Have you seen or consulted any other health care providers outside of the [address] [place] [address] since your last visit? \" no     3. For patients aged 39-70: Has the patient had a colonoscopy / FIT/ Cologuard? NA - based on age      If the patient is female:    4. For patients aged 41-77: Has the patient had a mammogram within the past 2 years? NA - based on age or sex  See top three    5. For patients aged 21-65: Has the patient had a pap smear?  NA - based on age or sex

## 2022-11-30 ENCOUNTER — OFFICE VISIT (OUTPATIENT)
Dept: INTERNAL MEDICINE CLINIC | Age: 77
End: 2022-11-30
Payer: MEDICARE

## 2022-11-30 VITALS
DIASTOLIC BLOOD PRESSURE: 60 MMHG | BODY MASS INDEX: 21.9 KG/M2 | WEIGHT: 116 LBS | HEART RATE: 70 BPM | HEIGHT: 61 IN | OXYGEN SATURATION: 100 % | SYSTOLIC BLOOD PRESSURE: 117 MMHG | RESPIRATION RATE: 18 BRPM | TEMPERATURE: 97 F

## 2022-11-30 DIAGNOSIS — K21.9 GASTROESOPHAGEAL REFLUX DISEASE WITHOUT ESOPHAGITIS: ICD-10-CM

## 2022-11-30 DIAGNOSIS — E78.5 HYPERLIPIDEMIA, UNSPECIFIED HYPERLIPIDEMIA TYPE: ICD-10-CM

## 2022-11-30 DIAGNOSIS — F41.9 ANXIETY: ICD-10-CM

## 2022-11-30 DIAGNOSIS — I10 PRIMARY HYPERTENSION: ICD-10-CM

## 2022-11-30 DIAGNOSIS — D64.9 CHRONIC ANEMIA: ICD-10-CM

## 2022-11-30 DIAGNOSIS — R07.89 ATYPICAL CHEST PAIN: Primary | ICD-10-CM

## 2022-11-30 RX ORDER — OMEPRAZOLE 40 MG/1
40 CAPSULE, DELAYED RELEASE ORAL DAILY
Qty: 90 CAPSULE | Refills: 3 | Status: SHIPPED | OUTPATIENT
Start: 2022-11-30

## 2022-12-14 ENCOUNTER — HOSPITAL ENCOUNTER (OUTPATIENT)
Dept: NON INVASIVE DIAGNOSTICS | Age: 77
Discharge: HOME | End: 2022-12-14
Attending: INTERNAL MEDICINE
Payer: MEDICARE

## 2022-12-14 ENCOUNTER — HOSPITAL ENCOUNTER (OUTPATIENT)
Dept: NUCLEAR MEDICINE | Age: 77
Discharge: HOME | End: 2022-12-14
Attending: INTERNAL MEDICINE
Payer: MEDICARE

## 2022-12-14 VITALS
DIASTOLIC BLOOD PRESSURE: 84 MMHG | WEIGHT: 114 LBS | SYSTOLIC BLOOD PRESSURE: 161 MMHG | HEIGHT: 61 IN | BODY MASS INDEX: 21.52 KG/M2

## 2022-12-14 VITALS
SYSTOLIC BLOOD PRESSURE: 161 MMHG | DIASTOLIC BLOOD PRESSURE: 84 MMHG | WEIGHT: 114 LBS | BODY MASS INDEX: 21.52 KG/M2 | HEIGHT: 61 IN

## 2022-12-14 DIAGNOSIS — R07.89 ATYPICAL CHEST PAIN: ICD-10-CM

## 2022-12-14 PROCEDURE — 93017 CV STRESS TEST TRACING ONLY: CPT

## 2022-12-14 PROCEDURE — 74011250636 HC RX REV CODE- 250/636: Performed by: INTERNAL MEDICINE

## 2022-12-14 PROCEDURE — A9502 TC99M TETROFOSMIN: HCPCS

## 2022-12-14 RX ORDER — SODIUM CHLORIDE 9 MG/ML
250 INJECTION, SOLUTION INTRAVENOUS ONCE
Status: COMPLETED | OUTPATIENT
Start: 2022-12-14 | End: 2022-12-14

## 2022-12-14 RX ADMIN — SODIUM CHLORIDE 250 ML: 900 INJECTION, SOLUTION INTRAVENOUS at 09:15

## 2022-12-14 RX ADMIN — REGADENOSON 0.4 MG: 0.08 INJECTION, SOLUTION INTRAVENOUS at 09:15

## 2022-12-18 ENCOUNTER — TELEPHONE (OUTPATIENT)
Dept: INTERNAL MEDICINE CLINIC | Age: 77
End: 2022-12-18

## 2022-12-19 NOTE — TELEPHONE ENCOUNTER
Patient contacted, and advised of results. Patient denies any more cp episodes, but is having some dizziness/light headedness.

## 2023-01-16 ENCOUNTER — PATIENT MESSAGE (OUTPATIENT)
Dept: INTERNAL MEDICINE CLINIC | Age: 78
End: 2023-01-16

## 2023-01-17 ENCOUNTER — VIRTUAL VISIT (OUTPATIENT)
Dept: INTERNAL MEDICINE CLINIC | Age: 78
End: 2023-01-17
Payer: MEDICARE

## 2023-01-17 DIAGNOSIS — I10 PRIMARY HYPERTENSION: ICD-10-CM

## 2023-01-17 DIAGNOSIS — K21.9 GASTROESOPHAGEAL REFLUX DISEASE WITHOUT ESOPHAGITIS: ICD-10-CM

## 2023-01-17 DIAGNOSIS — R05.9 COUGH, UNSPECIFIED TYPE: Primary | ICD-10-CM

## 2023-01-17 PROCEDURE — 99214 OFFICE O/P EST MOD 30 MIN: CPT | Performed by: INTERNAL MEDICINE

## 2023-01-17 PROCEDURE — 1090F PRES/ABSN URINE INCON ASSESS: CPT | Performed by: INTERNAL MEDICINE

## 2023-01-17 PROCEDURE — G8427 DOCREV CUR MEDS BY ELIG CLIN: HCPCS | Performed by: INTERNAL MEDICINE

## 2023-01-17 PROCEDURE — G8399 PT W/DXA RESULTS DOCUMENT: HCPCS | Performed by: INTERNAL MEDICINE

## 2023-01-17 PROCEDURE — 1123F ACP DISCUSS/DSCN MKR DOCD: CPT | Performed by: INTERNAL MEDICINE

## 2023-01-17 PROCEDURE — G8432 DEP SCR NOT DOC, RNG: HCPCS | Performed by: INTERNAL MEDICINE

## 2023-01-17 PROCEDURE — 1101F PT FALLS ASSESS-DOCD LE1/YR: CPT | Performed by: INTERNAL MEDICINE

## 2023-01-17 RX ORDER — LOSARTAN POTASSIUM 50 MG/1
50 TABLET ORAL DAILY
Qty: 30 TABLET | Refills: 5 | Status: SHIPPED | OUTPATIENT
Start: 2023-01-17

## 2023-01-17 NOTE — TELEPHONE ENCOUNTER
----- Message from Minh Blanton sent at 1/16/2023  8:21 PM EST -----  Regarding: Blood pressure   BP Readings you asked for

## 2023-01-17 NOTE — PROGRESS NOTES
Elizabeth Manrique is a 66 y.o. female who was seen by synchronous (real-time) audio-video technology on 1/17/2023 for Cough        Assessment & Plan:   Diagnoses and all orders for this visit:    1. Cough, unspecified type    2. Primary hypertension  -     losartan (COZAAR) 50 mg tablet; Take 1 Tablet by mouth daily. 3. Gastroesophageal reflux disease without esophagitis      712  Subjective:         Objective:   No flowsheet data found. General: alert, cooperative, no distress   Mental  status: normal mood, behavior, speech, dress, motor activity, and thought processes, able to follow commands   HENT: NCAT   Neck: no visualized mass   Resp: no respiratory distress   Neuro: no gross deficits   Skin: no discoloration or lesions of concern on visible areas   Psychiatric: normal affect, consistent with stated mood, no evidence of hallucinations     Additional exam findings: We discussed the expected course, resolution and complications of the diagnosis(es) in detail. Medication risks, benefits, costs, interactions, and alternatives were discussed as indicated. I advised her to contact the office if her condition worsens, changes or fails to improve as anticipated. She expressed understanding with the diagnosis(es) and plan. Elizabeth Manrique, was evaluated through a synchronous (real-time) audio-video encounter. The patient (or guardian if applicable) is aware that this is a billable service, which includes applicable co-pays. This Virtual Visit was conducted with patient's (and/or legal guardian's) consent. The visit was conducted pursuant to the emergency declaration under the [address] [place] [address], [address] [place] [address] authority and the [employer] and [place] General Act. Patient identification was verified, and a caregiver was present when appropriate.   The patient was located at: [place] [facility] [place] [phone]  The provider was located at: Facility ([facility], Memphist Department): [facility]        Omar Rivas MD    For the last 2 months or so, she has been having a dry cough. She has been on lisinopril for some years now. No wheezing, dyspnea, denied any sputum production, fevers, sinus or chest congestion. Allergies remain relatively controlled. She actually underwent GERD eval by GI, remains on PPI therapy. Does not really feel unwell per se    On a separate note, her blood pressures have been running high. However, she has been using a very old machine, unclear if reliable. Gets readings sometimes as low as 987 systolic, sometimes as high as 190s?! No chest pain, shortness of breath, she does have occasional headaches without focal neurologic complaints. Remains on amlodipine as well. No change in diet, salt intake, exercise level, etc.    Past Medical History:   Diagnosis Date    Allergic rhinitis     Anemia     lifelong;  low fe 9/22, saw Dr Ijeoma Hardin     CAD (coronary artery disease)     BICA<50% (2/11); BICA<50% (9/14)    Cervical spondylosis     Chronic constipation     Colon cancer ([facility] [address] 1989    s/p partial colon resection    Colon polyp 09/06/2019    Dr Abdon Dueñas    FHx: heart disease     GERD (gastroesophageal reflux disease)     H/O cardiovascular stress test     negative, EF 78%, no wma  (1/09); NST low risk, tid 0.87 (12/22)    Hyperlipidemia     Hypertension     Insomnia 11/01/2016    Lumbar spinal stenosis     s/p decomp Dr Gracie Solo    Microscopic hematuria     neg cysto Dr Muriel Maddox 2013    Osteopenia     DEXA t score -0.6 spine, -1.9 hip (1/12); -0.6 spine, -1.8 hip (5/14); -1.9 spine, -2.2 hpi FRAX 12/2.8 (11/20)    Vascular abnormality     RABI 1.21, LABI 1.22 (2.15)     Current Outpatient Medications   Medication Sig    losartan (COZAAR) 50 mg tablet Take 1 Tablet by mouth daily. omeprazole (PRILOSEC) 40 mg capsule Take 1 Capsule by mouth daily. atorvastatin (LIPITOR) 10 mg tablet Take 1 Tablet by mouth daily. calcium-cholecalciferol, d3, (CALCIUM 600 + D) 600-125 mg-unit tab Take  by mouth.    multivitamin (ONE A DAY) tablet Take 1 Tablet by mouth daily. ipratropium (ATROVENT) 21 mcg (0.03 %) nasal spray USE 2 SPRAYS IN EACH NOSTRIL EVERY 12 HOURS.    amLODIPine (NORVASC) 2.5 mg tablet Take 1 Tablet by mouth in the morning. ammonium lactate (LAC-HYDRIN) 12 % lotion     ALPRAZolam (Xanax) 0.25 mg tablet Take 1 Tablet by mouth three (3) times daily as needed for Anxiety. Max Daily Amount: 0.75 mg.    polyethylene glycol (MIRALAX) 17 gram/dose powder Take 17 g by mouth daily. (Patient taking differently: Take 17 g by mouth daily as needed.)    aspirin delayed-release 81 mg tablet Take 81 mg by mouth daily. MULTIVITS,CA,MINERALS/IRON/FA (MULTI FOR HER PO) Take 1 Tab by mouth daily. calcium-cholecalciferol, d3, 600-125 mg-unit tab Take 1 Tab by mouth daily. No current facility-administered medications for this visit. No Known Allergies    Assessment and plan:  1. Chronic cough. Possibly related to ACE inhibitor? We will hold the lisinopril, switch to losartan. If no improvement in a week or so, we will proceed with chest x-ray, etc.  2.  Hypertension. Change to losartan. She will get a new machine, get some more readings for us to review, may need to make adjustments to her regimen. Further recommendations pending results        Instructions above were handwritten on the lab results sheet that I gave the patient today    Above conditions discussed at length and patient vocalized understanding.   All questions answered to patient satisfaction

## 2023-01-31 ENCOUNTER — VIRTUAL VISIT (OUTPATIENT)
Dept: INTERNAL MEDICINE CLINIC | Age: 78
End: 2023-01-31
Payer: MEDICARE

## 2023-01-31 DIAGNOSIS — J06.9 UPPER RESPIRATORY TRACT INFECTION, UNSPECIFIED TYPE: Primary | ICD-10-CM

## 2023-01-31 DIAGNOSIS — R05.1 ACUTE COUGH: ICD-10-CM

## 2023-01-31 PROCEDURE — G8427 DOCREV CUR MEDS BY ELIG CLIN: HCPCS | Performed by: INTERNAL MEDICINE

## 2023-01-31 PROCEDURE — 1123F ACP DISCUSS/DSCN MKR DOCD: CPT | Performed by: INTERNAL MEDICINE

## 2023-01-31 PROCEDURE — G8399 PT W/DXA RESULTS DOCUMENT: HCPCS | Performed by: INTERNAL MEDICINE

## 2023-01-31 PROCEDURE — 99213 OFFICE O/P EST LOW 20 MIN: CPT | Performed by: INTERNAL MEDICINE

## 2023-01-31 PROCEDURE — 1101F PT FALLS ASSESS-DOCD LE1/YR: CPT | Performed by: INTERNAL MEDICINE

## 2023-01-31 PROCEDURE — G8432 DEP SCR NOT DOC, RNG: HCPCS | Performed by: INTERNAL MEDICINE

## 2023-01-31 PROCEDURE — 1090F PRES/ABSN URINE INCON ASSESS: CPT | Performed by: INTERNAL MEDICINE

## 2023-01-31 RX ORDER — HYDROCODONE POLISTIREX AND CHLORPHENIRAMINE POLISTIREX 10; 8 MG/5ML; MG/5ML
5 SUSPENSION, EXTENDED RELEASE ORAL
Qty: 70 ML | Refills: 0 | Status: SHIPPED | OUTPATIENT
Start: 2023-01-31 | End: 2023-02-07

## 2023-01-31 RX ORDER — AZITHROMYCIN 250 MG/1
TABLET, FILM COATED ORAL
Qty: 6 TABLET | Refills: 0 | Status: SHIPPED | OUTPATIENT
Start: 2023-01-31

## 2023-01-31 NOTE — PROGRESS NOTES
Diana Wadsworth is a 66 y.o. female who was seen by synchronous (real-time) audio-video technology on 1/31/2023 for URI        Assessment & Plan:   Diagnoses and all orders for this visit:    1. Upper respiratory tract infection, unspecified type  -     azithromycin (ZITHROMAX) 250 mg tablet; Take 2 tablets today, then take 1 tablet daily  -     HYDROcodone-chlorpheniramine (TUSSIONEX) 10-8 mg/5 mL suspension; Take 5 mL by mouth every twelve (12) hours as needed for Cough for up to 7 days. Max Daily Amount: 10 mL. 2. Acute cough  -     HYDROcodone-chlorpheniramine (TUSSIONEX) 10-8 mg/5 mL suspension; Take 5 mL by mouth every twelve (12) hours as needed for Cough for up to 7 days. Max Daily Amount: 10 mL. 712  Subjective:       Objective:   No flowsheet data found. General: alert, cooperative, no distress   Mental  status: normal mood, behavior, speech, dress, motor activity, and thought processes, able to follow commands   HENT: NCAT   Neck: no visualized mass   Resp: no respiratory distress   Neuro: no gross deficits   Skin: no discoloration or lesions of concern on visible areas   Psychiatric: normal affect, consistent with stated mood, no evidence of hallucinations     Additional exam findings: We discussed the expected course, resolution and complications of the diagnosis(es) in detail. Medication risks, benefits, costs, interactions, and alternatives were discussed as indicated. I advised her to contact the office if her condition worsens, changes or fails to improve as anticipated. She expressed understanding with the diagnosis(es) and plan. Diana Wadsworth, was evaluated through a synchronous (real-time) audio-video encounter. The patient (or guardian if applicable) is aware that this is a billable service, which includes applicable co-pays. This Virtual Visit was conducted with patient's (and/or legal guardian's) consent.  The visit was conducted pursuant to the emergency declaration under the [facility], [facility] authority and the [employer] and [place] General Act. Patient identification was verified, and a caregiver was present when appropriate. The patient was located at: Home: [place] [address] [phone]  The provider was located at: Facility (Appt Department): [facility]        Zainab Holland MD    She has had uri sx about 5 days. Reports sinus congestion, facial pressure, mostly clear drainage, lgf to 99+, postnasal drip and persistent cough,. No wheezing, dyspnea, known exposures, has had covid test neg x2    Past Medical History:   Diagnosis Date    Allergic rhinitis     Anemia     lifelong;  low fe 9/22, saw Dr Nacho Dong     CAD (coronary artery disease)     BICA<50% (2/11); BICA<50% (9/14)    Cervical spondylosis     Chronic constipation     Colon cancer ([facility] [address] 1989    s/p partial colon resection    Colon polyp 09/06/2019    Dr Andrzej Solis    FHx: heart disease     GERD (gastroesophageal reflux disease)     H/O cardiovascular stress test     negative, EF 78%, no wma  (1/09); NST low risk, tid 0.87 (12/22)    Hyperlipidemia     Hypertension     Insomnia 11/01/2016    Lumbar spinal stenosis     s/p decomp Dr Watson Pill    Microscopic hematuria     neg cysto Dr Jonas Estrada 2013    Osteopenia     DEXA t score -0.6 spine, -1.9 hip (1/12); -0.6 spine, -1.8 hip (5/14); -1.9 spine, -2.2 hpi FRAX 12/2.8 (11/20)    Vascular abnormality     RABI 1.21, LABI 1.22 (2.15)     Current Outpatient Medications   Medication Sig    azithromycin (ZITHROMAX) 250 mg tablet Take 2 tablets today, then take 1 tablet daily    HYDROcodone-chlorpheniramine (TUSSIONEX) 10-8 mg/5 mL suspension Take 5 mL by mouth every twelve (12) hours as needed for Cough for up to 7 days. Max Daily Amount: 10 mL.     amLODIPine (NORVASC) 2.5 mg tablet TAKE 1 TABLET BY MOUTH IN THE MORNING losartan (COZAAR) 50 mg tablet Take 1 Tablet by mouth daily. omeprazole (PRILOSEC) 40 mg capsule Take 1 Capsule by mouth daily. atorvastatin (LIPITOR) 10 mg tablet Take 1 Tablet by mouth daily. calcium-cholecalciferol, d3, (CALCIUM 600 + D) 600-125 mg-unit tab Take  by mouth.    multivitamin (ONE A DAY) tablet Take 1 Tablet by mouth daily. ipratropium (ATROVENT) 21 mcg (0.03 %) nasal spray USE 2 SPRAYS IN EACH NOSTRIL EVERY 12 HOURS.    ammonium lactate (LAC-HYDRIN) 12 % lotion     ALPRAZolam (Xanax) 0.25 mg tablet Take 1 Tablet by mouth three (3) times daily as needed for Anxiety. Max Daily Amount: 0.75 mg.    polyethylene glycol (MIRALAX) 17 gram/dose powder Take 17 g by mouth daily. (Patient taking differently: Take 17 g by mouth daily as needed.)    aspirin delayed-release 81 mg tablet Take 81 mg by mouth daily. MULTIVITS,CA,MINERALS/IRON/FA (MULTI FOR HER PO) Take 1 Tab by mouth daily. calcium-cholecalciferol, d3, 600-125 mg-unit tab Take 1 Tab by mouth daily. No current facility-administered medications for this visit. No Known Allergies    Assessment and plan:  1. RAZA Shaw, otc meds for sx relief and tussionex. Call if no better        Above conditions discussed at length and patient vocalized understanding.   All questions answered to patient satisfaction

## 2023-02-04 DIAGNOSIS — D64.9 CHRONIC ANEMIA: Primary | ICD-10-CM

## 2023-02-05 DIAGNOSIS — E78.5 HYPERLIPIDEMIA, UNSPECIFIED HYPERLIPIDEMIA TYPE: Primary | ICD-10-CM

## 2023-02-06 DIAGNOSIS — E78.5 HYPERLIPIDEMIA, UNSPECIFIED HYPERLIPIDEMIA TYPE: Primary | ICD-10-CM

## 2023-02-15 ENCOUNTER — TELEPHONE (OUTPATIENT)
Age: 78
End: 2023-02-15

## 2023-02-15 DIAGNOSIS — E78.5 HYPERLIPIDEMIA, UNSPECIFIED HYPERLIPIDEMIA TYPE: Primary | ICD-10-CM

## 2023-02-15 RX ORDER — ATORVASTATIN CALCIUM 10 MG/1
10 TABLET, FILM COATED ORAL DAILY
COMMUNITY
Start: 2022-11-14 | End: 2023-02-15 | Stop reason: SDUPTHER

## 2023-02-15 RX ORDER — ATORVASTATIN CALCIUM 10 MG/1
10 TABLET, FILM COATED ORAL DAILY
Qty: 90 TABLET | Refills: 3 | Status: SHIPPED | OUTPATIENT
Start: 2023-02-15

## 2023-02-15 NOTE — TELEPHONE ENCOUNTER
Patient wants to starting get this from Saroj at [facility]. Please send in new rx.    atorvastatin (LIPITOR) 10 mg tablet 90 Tablet 3 11/14/2022     Sig - Route:  Take 1 Tablet by mouth daily. - Oral    Sent to pharmacy as: atorvastatin 10 mg tablet (LIPITOR)    E-Prescribing Status: Receipt confirmed by pharmacy (11/14/2022 10:46 PM EST)

## 2023-05-02 ENCOUNTER — TELEPHONE (OUTPATIENT)
Age: 78
End: 2023-05-02

## 2023-05-02 NOTE — TELEPHONE ENCOUNTER
----- Message from Liseth Stacy sent at 5/1/2023 12:14 PM EDT -----  Subject: Referral Request    Reason for referral request? Pt requesting orders for a complete lab check   prior to their next scheduled visit 05/24/2023. Please advise. Provider patient wants to be referred to(if known):     Provider Phone Number(if known):     Additional Information for Provider?   ---------------------------------------------------------------------------  --------------  [id number]    [phone]; OK to leave message on voicemail  ---------------------------------------------------------------------------  --------------

## 2023-05-11 ENCOUNTER — HOSPITAL ENCOUNTER (OUTPATIENT)
Facility: HOSPITAL | Age: 78
Setting detail: RECURRING SERIES
Discharge: HOME | End: 2023-05-14
Payer: MEDICARE

## 2023-05-11 PROCEDURE — 97161 PT EVAL LOW COMPLEX 20 MIN: CPT

## 2023-05-18 LAB
A/G RATIO: 1.6 RATIO (ref 1.1–2.6)
ALBUMIN SERPL-MCNC: 4.4 G/DL (ref 3.5–5)
ALP BLD-CCNC: 94 U/L (ref 40–120)
ALT SERPL-CCNC: 11 U/L (ref 5–40)
ANION GAP SERPL CALCULATED.3IONS-SCNC: 14 MMOL/L (ref 3–15)
AST SERPL-CCNC: 21 U/L (ref 10–37)
BILIRUB SERPL-MCNC: 0.5 MG/DL (ref 0.2–1.2)
BUN BLDV-MCNC: 19 MG/DL (ref 6–22)
CALCIUM SERPL-MCNC: 9.2 MG/DL (ref 8.4–10.5)
CHLORIDE BLD-SCNC: 101 MMOL/L (ref 98–110)
CHOLESTEROL/HDL RATIO: 2.6 (ref 0–5)
CHOLESTEROL: 184 MG/DL (ref 110–200)
CO2: 27 MMOL/L (ref 20–32)
CREAT SERPL-MCNC: 1.1 MG/DL (ref 0.8–1.4)
GLOBULIN: 2.7 G/DL (ref 2–4)
GLOMERULAR FILTRATION RATE: 49.3 ML/MIN/1.73 SQ.M.
GLUCOSE: 63 MG/DL (ref 70–99)
HCT VFR BLD CALC: 33.4 % (ref 35.1–48.3)
HDLC SERPL-MCNC: 72 MG/DL
HEMOGLOBIN: 10.7 G/DL (ref 11.7–16.1)
LDL CHOLESTEROL CALCULATED: 100 MG/DL (ref 50–99)
LDL/HDL RATIO: 1.4
MCH RBC QN AUTO: 29 PG (ref 26–34)
MCHC RBC AUTO-ENTMCNC: 32 G/DL (ref 31–36)
MCV RBC AUTO: 90 FL (ref 80–99)
NON-HDL CHOLESTEROL: 112 MG/DL
PDW BLD-RTO: 14.4 % (ref 10–15.5)
PLATELET # BLD: 230 K/UL (ref 140–440)
PMV BLD AUTO: 10.9 FL (ref 9–13)
POTASSIUM SERPL-SCNC: 4.1 MMOL/L (ref 3.5–5.5)
RBC: 3.73 M/UL (ref 3.8–5.2)
SODIUM BLD-SCNC: 142 MMOL/L (ref 133–145)
TOTAL PROTEIN: 7.1 G/DL (ref 6.2–8.1)
TRIGL SERPL-MCNC: 58 MG/DL (ref 40–149)
VLDLC SERPL CALC-MCNC: 12 MG/DL (ref 8–30)
WBC: 5 K/UL (ref 4–11)

## 2023-05-21 SDOH — ECONOMIC STABILITY: FOOD INSECURITY: WITHIN THE PAST 12 MONTHS, THE FOOD YOU BOUGHT JUST DIDN'T LAST AND YOU DIDN'T HAVE MONEY TO GET MORE.: NEVER TRUE

## 2023-05-21 SDOH — ECONOMIC STABILITY: TRANSPORTATION INSECURITY
IN THE PAST 12 MONTHS, HAS LACK OF TRANSPORTATION KEPT YOU FROM MEETINGS, WORK, OR FROM GETTING THINGS NEEDED FOR DAILY LIVING?: NO

## 2023-05-21 SDOH — ECONOMIC STABILITY: HOUSING INSECURITY
IN THE LAST 12 MONTHS, WAS THERE A TIME WHEN YOU DID NOT HAVE A STEADY PLACE TO SLEEP OR SLEPT IN A SHELTER (INCLUDING NOW)?: NO

## 2023-05-21 SDOH — ECONOMIC STABILITY: FOOD INSECURITY: WITHIN THE PAST 12 MONTHS, YOU WORRIED THAT YOUR FOOD WOULD RUN OUT BEFORE YOU GOT MONEY TO BUY MORE.: NEVER TRUE

## 2023-05-21 SDOH — ECONOMIC STABILITY: INCOME INSECURITY: HOW HARD IS IT FOR YOU TO PAY FOR THE VERY BASICS LIKE FOOD, HOUSING, MEDICAL CARE, AND HEATING?: NOT HARD AT ALL

## 2023-05-23 NOTE — PROGRESS NOTES
Neil Arias presents today for   Chief Complaint   Patient presents with    Medicare AWV    Discuss Labs     5-17-23    Hyperlipidemia    Gastroesophageal Reflux    Hypertension     1. \"Have you been to the ER, urgent care clinic since your last visit? Hospitalized since your last visit? \" no    2. \"Have you seen or consulted any other health care providers outside of the [address] [place] [address] since your last visit? \" no     3. For patients aged 39-70: Has the patient had a colonoscopy / FIT/ Cologuard? NA - based on age      If the patient is female:    4. For patients aged 41-77: Has the patient had a mammogram within the past 2 years? NA - based on age or sex      11. For patients aged 21-65: Has the patient had a pap smear?  NA - based on age or sex

## 2023-05-24 ENCOUNTER — OFFICE VISIT (OUTPATIENT)
Age: 78
End: 2023-05-24
Payer: MEDICARE

## 2023-05-24 VITALS
TEMPERATURE: 97.1 F | RESPIRATION RATE: 18 BRPM | SYSTOLIC BLOOD PRESSURE: 142 MMHG | DIASTOLIC BLOOD PRESSURE: 66 MMHG | WEIGHT: 110 LBS | HEIGHT: 61 IN | HEART RATE: 66 BPM | OXYGEN SATURATION: 99 % | BODY MASS INDEX: 20.77 KG/M2

## 2023-05-24 DIAGNOSIS — Z12.31 SCREENING MAMMOGRAM FOR BREAST CANCER: ICD-10-CM

## 2023-05-24 DIAGNOSIS — Z91.81 AT HIGH RISK FOR FALLS: ICD-10-CM

## 2023-05-24 DIAGNOSIS — D64.9 CHRONIC ANEMIA: ICD-10-CM

## 2023-05-24 DIAGNOSIS — E78.5 HYPERLIPIDEMIA, UNSPECIFIED HYPERLIPIDEMIA TYPE: ICD-10-CM

## 2023-05-24 DIAGNOSIS — N18.31 STAGE 3A CHRONIC KIDNEY DISEASE (HCC): ICD-10-CM

## 2023-05-24 DIAGNOSIS — Z71.89 ADVANCED CARE PLANNING/COUNSELING DISCUSSION: ICD-10-CM

## 2023-05-24 DIAGNOSIS — I10 PRIMARY HYPERTENSION: ICD-10-CM

## 2023-05-24 DIAGNOSIS — M80.00XA AGE-RELATED OSTEOPOROSIS WITH CURRENT PATHOLOGICAL FRACTURE, INITIAL ENCOUNTER: ICD-10-CM

## 2023-05-24 DIAGNOSIS — Z00.00 MEDICARE ANNUAL WELLNESS VISIT, SUBSEQUENT: Primary | ICD-10-CM

## 2023-05-24 DIAGNOSIS — K21.9 GASTROESOPHAGEAL REFLUX DISEASE WITHOUT ESOPHAGITIS: ICD-10-CM

## 2023-05-24 PROBLEM — N18.9 CKD (CHRONIC KIDNEY DISEASE): Status: ACTIVE | Noted: 2022-01-01

## 2023-05-24 PROCEDURE — 99497 ADVNCD CARE PLAN 30 MIN: CPT | Performed by: INTERNAL MEDICINE

## 2023-05-24 PROCEDURE — 3077F SYST BP >= 140 MM HG: CPT | Performed by: INTERNAL MEDICINE

## 2023-05-24 PROCEDURE — 3078F DIAST BP <80 MM HG: CPT | Performed by: INTERNAL MEDICINE

## 2023-05-24 PROCEDURE — 1123F ACP DISCUSS/DSCN MKR DOCD: CPT | Performed by: INTERNAL MEDICINE

## 2023-05-24 PROCEDURE — 99211 OFF/OP EST MAY X REQ PHY/QHP: CPT

## 2023-05-24 PROCEDURE — 99215 OFFICE O/P EST HI 40 MIN: CPT | Performed by: INTERNAL MEDICINE

## 2023-05-24 PROCEDURE — G0439 PPPS, SUBSEQ VISIT: HCPCS | Performed by: INTERNAL MEDICINE

## 2023-05-24 RX ORDER — LOSARTAN POTASSIUM 50 MG/1
50 TABLET ORAL DAILY
COMMUNITY
Start: 2023-05-13

## 2023-05-24 RX ORDER — VIT C/B6/B5/MAGNESIUM/HERB 173 50-5-6-5MG
CAPSULE ORAL DAILY
COMMUNITY

## 2023-05-24 ASSESSMENT — PATIENT HEALTH QUESTIONNAIRE - PHQ9
1. LITTLE INTEREST OR PLEASURE IN DOING THINGS: 0
SUM OF ALL RESPONSES TO PHQ QUESTIONS 1-9: 0
SUM OF ALL RESPONSES TO PHQ9 QUESTIONS 1 & 2: 0
SUM OF ALL RESPONSES TO PHQ QUESTIONS 1-9: 0
SUM OF ALL RESPONSES TO PHQ QUESTIONS 1-9: 0
2. FEELING DOWN, DEPRESSED OR HOPELESS: 0
SUM OF ALL RESPONSES TO PHQ QUESTIONS 1-9: 0

## 2023-05-24 ASSESSMENT — LIFESTYLE VARIABLES
HOW MANY STANDARD DRINKS CONTAINING ALCOHOL DO YOU HAVE ON A TYPICAL DAY: 1 OR 2
HOW OFTEN DO YOU HAVE A DRINK CONTAINING ALCOHOL: 2-4 TIMES A MONTH

## 2023-05-24 NOTE — ACP (ADVANCE CARE PLANNING)
Advance Care Planning     Advance Care Planning (ACP) Physician/NP/PA Conversation    Date of Conversation: 5/24/2023  Conducted with: Patient with Decision Making Capacity    Healthcare Decision Maker:      Primary Decision Maker: Joshua Mcnair - Spouse - [phone]    Click here to complete [address] including selection of the Healthcare Decision Maker Relationship (ie \"Primary\")  Today we documented Decision Maker(s) consistent with ACP documents on file. Care Preferences:    Hospitalization: \"If your health worsens and it becomes clear that your chance of recovery is unlikely, what would be your preference regarding hospitalization? \"  The patient would prefer hospitalization. Ventilation: \"If you were unable to breath on your own and your chance of recovery was unlikely, what would be your preference about the use of a ventilator (breathing machine) if it was available to you? \"  The patient would desire the use of a ventilator. Resuscitation: \"In the event your heart stopped as a result of an underlying serious health condition, would you want attempts made to restart your heart, or would you prefer a natural death? \"  Yes, attempt to resuscitate.     ventilation preferences, hospitalization preferences, and resuscitation preferences    Conversation Outcomes / Follow-Up Plan:  ACP in process - information provided, considering goals and options  Reviewed DNR/DNI and patient elects Full Code (Attempt Resuscitation)    Length of Voluntary ACP Conversation in minutes:  16 minutes    Benjaman Sandifer, MD

## 2023-06-05 ENCOUNTER — HOSPITAL ENCOUNTER (OUTPATIENT)
Facility: HOSPITAL | Age: 78
Setting detail: RECURRING SERIES
Discharge: HOME | End: 2023-06-08
Payer: MEDICARE

## 2023-06-05 PROCEDURE — 97140 MANUAL THERAPY 1/> REGIONS: CPT

## 2023-06-05 PROCEDURE — 97110 THERAPEUTIC EXERCISES: CPT

## 2023-06-05 NOTE — PROGRESS NOTES
06/05/23  2 patient will have pain 3/10 to aid with increase tolerance to ADLS and regular daily activities at home and in the community  EVAL 5     Long Term Goals:  To be accomplished in 16treatments  1 patient will have established and be I with HEP to aid with independence and self management at discharge  EVAL HEP issued at evaluation  2 patient will have pain 1-2/10 to aid with increase tolerance to ADLS and regular daily activities at home and in the community  EVAL 5  3 patient will have DF left ankle to +5 to aid with gait mechanics  EVAL long sitting -30N - -10N  4 patient will tolerate stairs 6\" 2x reciprocal 1 rail for carryover to home  EVAL step to pattern  5 patient will have FOTO improved to 70 to show significant and projected gains in activities at home and in the community  EVAL 52    PLAN  Yes  Continue plan of care  [x]  Upgrade activities as tolerated  []  Discharge due to :  []  Other:    Uma Arriaza, PT    6/5/2023    12:28 PM    Future Appointments   Date Time Provider Eliazar Quintero   6/7/2023 10:10 AM Treasure Morales, PTA [facility]   6/12/2023 10:10 AM Jack Li, PTA [facility]   6/14/2023 10:10 AM Jack Li, PTA [facility]   6/19/2023 11:30 AM Rei Carroll, PT [facility]   6/21/2023 10:10 AM Rei Glen, PT [facility]   6/26/2023 10:10 AM Jack Li, PTA [facility]   6/28/2023 10:10 AM Reibakari Carroll, PT [facility]   11/20/2023  9:25 AM [facility] LAB V[facility]IT [facility] AM[facility]   11/28/2023 10:40 AM Maria Luisa Rudolph MD [facility] AM[facility]

## 2023-06-06 ENCOUNTER — COMPREHENSIVE EXAM (OUTPATIENT)
Dept: URBAN - METROPOLITAN AREA CLINIC 1 | Facility: CLINIC | Age: 78
End: 2023-06-06

## 2023-06-06 DIAGNOSIS — Z96.1: ICD-10-CM

## 2023-06-06 DIAGNOSIS — H16.143: ICD-10-CM

## 2023-06-06 DIAGNOSIS — H04.123: ICD-10-CM

## 2023-06-06 DIAGNOSIS — H40.023: ICD-10-CM

## 2023-06-06 DIAGNOSIS — H35.362: ICD-10-CM

## 2023-06-06 PROCEDURE — 99214 OFFICE O/P EST MOD 30 MIN: CPT

## 2023-06-06 PROCEDURE — 92133 CPTRZD OPH DX IMG PST SGM ON: CPT

## 2023-06-06 ASSESSMENT — VISUAL ACUITY
OD_SC: 20/20
OU_SC: 20/20
OU_SC: J1+
OS_SC: J1+

## 2023-06-06 ASSESSMENT — KERATOMETRY
OD_AXISANGLE2_DEGREES: 094
OD_K2POWER_DIOPTERS: 44.50
OD_K1POWER_DIOPTERS: 43.25
OS_K1POWER_DIOPTERS: 43.25
OS_AXISANGLE_DEGREES: 170
OS_K2POWER_DIOPTERS: 44.00
OS_AXISANGLE2_DEGREES: 080
OD_AXISANGLE_DEGREES: 4

## 2023-06-06 ASSESSMENT — TONOMETRY
OS_IOP_MMHG: 12
OD_IOP_MMHG: 12

## 2023-06-07 ENCOUNTER — HOSPITAL ENCOUNTER (OUTPATIENT)
Facility: HOSPITAL | Age: 78
Setting detail: RECURRING SERIES
Discharge: HOME | End: 2023-06-10
Payer: MEDICARE

## 2023-06-07 PROCEDURE — 97110 THERAPEUTIC EXERCISES: CPT

## 2023-06-07 PROCEDURE — 97530 THERAPEUTIC ACTIVITIES: CPT

## 2023-06-07 PROCEDURE — 97140 MANUAL THERAPY 1/> REGIONS: CPT

## 2023-06-07 NOTE — PROGRESS NOTES
to modify and progress therapeutic interventions, analyze and address functional mobility deficits, analyze and address ROM deficits, analyze and address strength deficits, analyze and address soft tissue restrictions, analyze and cue for proper movement patterns, analyze and modify for postural abnormalities, analyze and address imbalance/dizziness, and instruct in home and community integration to address functional deficits and attain remaining goals. Progress toward goals / Updated goals:  []  See Progress Note/Recertification  Short Term Goals: To be accomplished in 6 treatments  1 patient will have established and be I with HEP to aid with independence and self management at discharge  EVAL HEP issued at evaluation  Current: reports initial compliance, 06/05/23  2 patient will have pain 3/10 to aid with increase tolerance to ADLS and regular daily activities at home and in the community  EVAL 5     Long Term Goals:  To be accomplished in 16treatments  1 patient will have established and be I with HEP to aid with independence and self management at discharge  EVAL HEP issued at evaluation  2 patient will have pain 1-2/10 to aid with increase tolerance to ADLS and regular daily activities at home and in the community  EVAL 5  3 patient will have DF left ankle to +5 to aid with gait mechanics  EVAL long sitting -30N - -10N  4 patient will tolerate stairs 6\" 2x reciprocal 1 rail for carryover to home  EVAL step to pattern  5 patient will have FOTO improved to 70 to show significant and projected gains in activities at home and in the community  EVAL 52         PLAN  Yes  Continue plan of care  []  Upgrade activities as tolerated  []  Discharge due to :  []  Other:    Lucy Moran, PTA    6/7/2023    10:24 AM    Future Appointments   Date Time Provider Eliazar Quintero   6/12/2023 10:10 AM Moises Counts, PTA [facility]   6/14/2023 10:10 AM Moises Counts, PTA [facility]   6/19/2023 11:30 AM Leo Matta

## 2023-06-19 ENCOUNTER — HOSPITAL ENCOUNTER (OUTPATIENT)
Facility: HOSPITAL | Age: 78
Setting detail: RECURRING SERIES
Discharge: HOME | End: 2023-06-22
Payer: MEDICARE

## 2023-06-19 PROCEDURE — 97140 MANUAL THERAPY 1/> REGIONS: CPT

## 2023-06-19 PROCEDURE — 97110 THERAPEUTIC EXERCISES: CPT

## 2023-06-19 PROCEDURE — 97530 THERAPEUTIC ACTIVITIES: CPT

## 2023-06-19 NOTE — PROGRESS NOTES
PHYSICAL / OCCUPATIONAL THERAPY - DAILY TREATMENT NOTE (updated )    Patient Name: Alicia Adkins    Date: 2023    : 1945  Insurance: Payor: Louise Sam / Plan: Louise Sam / Product Type: *No Product type* /      Patient  verified Yes     Visit #   Current / Total 6 16   Time   In / Out 1128 1222   Pain   In / Out 3-4 2   Subjective Functional Status/Changes: Feeling better than I did last week. TREATMENT AREA =  Pain in left ankle and joints of left foot [M25.572]    OBJECTIVE    Modalities Rationale:     decrease pain and increase tissue extensibility to improve patient's ability to progress to PLOF and address remaining functional goals. min [] Estim Unattended, type/location:                                      []  w/ice    []  w/heat    min [] Estim Attended, type/location:                                     []  w/US     []  w/ice    []  w/heat    []  TENS insruct      min []  Mechanical Traction: type/lbs                   []  pro   []  sup   []  int   []  cont    []  before manual    []  after manual    min []  Ultrasound, settings/location:      min []  Iontophoresis w/ dexamethasone, location:                                               []  take home patch       []  in clinic   10 min  unbill []  Ice     [x]  Heat   post  location/position:reclined, left ankle     min []  Paraffin,  details:     min []  Vasopneumatic Device, press/temp:     min []  [place] Flax / Atlee Lade: If using vaso (only need to measure limb vaso being performed on)      pre-treatment girth :       post-treatment girth :       measured at (landmark location) :      min []  Other:    Skin assessment post-treatment (if applicable):    []  intact    []  redness- no adverse reaction                 []redness - adverse reaction:         Therapeutic Procedures:     Tx Min Billable or 1:1 Min (if diff from Tx Min) Procedure, Rationale, Specifics   [phone] Therapeutic Exercise (timed):  increase ROM,

## 2023-06-21 ENCOUNTER — HOSPITAL ENCOUNTER (OUTPATIENT)
Facility: HOSPITAL | Age: 78
Setting detail: RECURRING SERIES
Discharge: HOME | End: 2023-06-24
Payer: MEDICARE

## 2023-06-21 PROCEDURE — 97530 THERAPEUTIC ACTIVITIES: CPT

## 2023-06-21 PROCEDURE — 97140 MANUAL THERAPY 1/> REGIONS: CPT

## 2023-06-21 PROCEDURE — 97110 THERAPEUTIC EXERCISES: CPT

## 2023-06-21 NOTE — PROGRESS NOTES
mechanics  PN: Left ankle DF: +5. MET    Current: NA 6/21/23     4 patient will tolerate stairs 6\" 2x reciprocal 1 rail for carryover to home  PN: Patient is able to ascend stairs with reciprocal pattern and 1 handrail and descends stairs with step to pattern. Current: 6\" no rail ascending  reciprocal, 1 rail descending  reciprocal with decrease ease 2x   6/21/23     5 patient will have FOTO improved to 70 to show significant and projected gains in activities at home and in the community  PN: 54 points.    Current: NA 6/21/23       Next PN/ RC due  recert due 8/40/70    PLAN  Yes  Continue plan of care  [x]  Upgrade activities as tolerated  []  Discharge due to :  []  Other:    Dimas Carroll, JANY    6/21/2023    10:08 AM    Future Appointments   Date Time Provider Eliazar Quintero   6/21/2023 10:10 AM Dimas Carroll PT [facility]   6/26/2023 10:10 AM Cass Monk PTA [facility]   6/28/2023 10:10 AM Dimas Carroll PT [facility]   7/26/2023 10:00 AM [facility]EL RM 4 3D [facility] [place]   11/20/2023  9:25 AM [facility] LAB VISIT [facility] BS AMB   11/28/2023 10:40 AM Jessie Thomas MD [facility] AMB

## 2023-06-26 ENCOUNTER — HOSPITAL ENCOUNTER (OUTPATIENT)
Facility: HOSPITAL | Age: 78
Setting detail: RECURRING SERIES
Discharge: HOME | End: 2023-06-29
Payer: MEDICARE

## 2023-06-26 PROCEDURE — 97140 MANUAL THERAPY 1/> REGIONS: CPT

## 2023-06-26 PROCEDURE — 97112 NEUROMUSCULAR REEDUCATION: CPT

## 2023-06-26 PROCEDURE — 97110 THERAPEUTIC EXERCISES: CPT

## 2023-06-28 ENCOUNTER — HOSPITAL ENCOUNTER (OUTPATIENT)
Facility: HOSPITAL | Age: 78
Setting detail: RECURRING SERIES
Discharge: HOME | End: 2023-07-01
Payer: MEDICARE

## 2023-06-28 PROCEDURE — 97110 THERAPEUTIC EXERCISES: CPT

## 2023-06-28 PROCEDURE — 97530 THERAPEUTIC ACTIVITIES: CPT

## 2023-06-28 PROCEDURE — 97140 MANUAL THERAPY 1/> REGIONS: CPT

## 2023-07-06 NOTE — TELEPHONE ENCOUNTER
PCP:  Shahla Ambriz MD    Last appt: [unfilled]  Future Appointments   Date Time Provider [address]   7/7/2023  9:30 AM Saira Houston PTA [facility]   7/11/2023  2:50 PM Saira Houston PTA [facility]   7/26/2023 10:00 AM [facility]D [facility]   11/20/2023  9:25 AM [facility] LAB VISIT IO BS AMB   11/28/2023 10:40 AM Shahla Ambriz MD [facility] BS AMB       Requested Prescriptions     Pending Prescriptions Disp Refills    losartan (COZAAR) 50 MG tablet [Pharmacy Med Name: Losartan Potassium 50 MG Oral Tablet] 90 tablet 3     Sig: Take 1 tablet by mouth once daily     Patient requesting 90 day supply

## 2023-07-07 ENCOUNTER — HOSPITAL ENCOUNTER (OUTPATIENT)
Facility: HOSPITAL | Age: 78
Setting detail: RECURRING SERIES
Discharge: HOME | End: 2023-07-10
Payer: MEDICARE

## 2023-07-07 PROCEDURE — 97112 NEUROMUSCULAR REEDUCATION: CPT

## 2023-07-07 PROCEDURE — 97110 THERAPEUTIC EXERCISES: CPT

## 2023-07-07 PROCEDURE — 97530 THERAPEUTIC ACTIVITIES: CPT

## 2023-07-07 RX ORDER — LOSARTAN POTASSIUM 50 MG/1
TABLET ORAL
Qty: 90 TABLET | Refills: 3 | Status: SHIPPED | OUTPATIENT
Start: 2023-07-07

## 2023-07-11 ENCOUNTER — HOSPITAL ENCOUNTER (OUTPATIENT)
Facility: HOSPITAL | Age: 78
Setting detail: RECURRING SERIES
Discharge: HOME | End: 2023-07-14
Payer: MEDICARE

## 2023-07-11 PROCEDURE — 97110 THERAPEUTIC EXERCISES: CPT

## 2023-07-11 PROCEDURE — 97140 MANUAL THERAPY 1/> REGIONS: CPT

## 2023-07-11 PROCEDURE — 97530 THERAPEUTIC ACTIVITIES: CPT

## 2023-07-11 NOTE — TELEPHONE ENCOUNTER
Patient called and needs a medication refill.  Please advise    ipratropium (ATROVENT) 0.03 % nasal spray     omeprazole (PRILOSEC) 40 MG delayed release capsule

## 2023-07-11 NOTE — PROGRESS NOTES
DF: +6. MET      4 patient will tolerate stairs 6\" 2x reciprocal 1 rail for carryover to home  PN: Patient is able to ascend stairs with reciprocal pattern and 1 handrail and descends stairs with step to pattern. Current: 6\" no rail ascending  reciprocal, 1 rail descending  reciprocal with decrease ease 2x. MET      5 patient will have FOTO improved to 70 to show significant and projected gains in activities at home and in the community  PN: 54 points. Current: 63 points. Progressing        RECOMMENDATIONS  Discontinue therapy. Progressing towards or have reached established goals.     Brendon More, BERNADINE       7/11/2023       3:13 PM    Payor: Alexandra Harding / Plan: Alexandra Harding / Product Type: *No Product type* /      Not Medicaid Ins, no signature required for DC

## 2023-07-12 NOTE — TELEPHONE ENCOUNTER
PCP: Ella Das MD    Last appt: [unfilled]  Future Appointments   Date Time Provider [address]   7/26/2023 10:00 AM [facility] RM 4 3D [facility] [place]   11/20/2023  9:25 AM [facility] LAB VISIT IO BS AMB   11/28/2023 10:40 AM Ella Das MD [facility] BS AMB       Requested Prescriptions     Pending Prescriptions Disp Refills    omeprazole (PRILOSEC) 40 MG delayed release capsule 90 capsule 3     Sig: Take 1 capsule by mouth daily    ipratropium (ATROVENT) 0.03 % nasal spray 30 mL 3     Sig: USE 2 SPRAYS IN EACH NOSTRIL EVERY 12 HOURS.

## 2023-07-14 RX ORDER — OMEPRAZOLE 40 MG/1
40 CAPSULE, DELAYED RELEASE ORAL DAILY
Qty: 90 CAPSULE | Refills: 3 | Status: SHIPPED | OUTPATIENT
Start: 2023-07-14

## 2023-07-14 RX ORDER — IPRATROPIUM BROMIDE 21 UG/1
SPRAY, METERED NASAL
Qty: 30 ML | Refills: 3 | Status: SHIPPED | OUTPATIENT
Start: 2023-07-14

## 2023-07-26 ENCOUNTER — HOSPITAL ENCOUNTER (OUTPATIENT)
Facility: HOSPITAL | Age: 78
Discharge: HOME | End: 2023-07-29
Attending: INTERNAL MEDICINE
Payer: MEDICARE

## 2023-07-26 DIAGNOSIS — Z12.31 SCREENING MAMMOGRAM FOR BREAST CANCER: ICD-10-CM

## 2023-07-26 PROCEDURE — 77063 BREAST TOMOSYNTHESIS BI: CPT

## 2023-09-26 ENCOUNTER — OFFICE VISIT (OUTPATIENT)
Age: 78
End: 2023-09-26
Payer: MEDICARE

## 2023-09-26 VITALS
OXYGEN SATURATION: 100 % | DIASTOLIC BLOOD PRESSURE: 58 MMHG | WEIGHT: 115 LBS | BODY MASS INDEX: 21.71 KG/M2 | TEMPERATURE: 97 F | HEIGHT: 61 IN | HEART RATE: 71 BPM | SYSTOLIC BLOOD PRESSURE: 159 MMHG | RESPIRATION RATE: 18 BRPM

## 2023-09-26 DIAGNOSIS — R30.0 DYSURIA: Primary | ICD-10-CM

## 2023-09-26 LAB
BILIRUBIN, URINE, POC: NEGATIVE
BLOOD URINE, POC: ABNORMAL
GLUCOSE URINE, POC: NEGATIVE
KETONES, URINE, POC: NEGATIVE
LEUKOCYTE ESTERASE, URINE, POC: NEGATIVE
NITRITE, URINE, POC: NEGATIVE
PH, URINE, POC: 5.5 (ref 4.6–8)
PROTEIN,URINE, POC: NEGATIVE
SPECIFIC GRAVITY, URINE, POC: 5.5 (ref 1–1.03)
URINALYSIS CLARITY, POC: CLEAR
URINALYSIS COLOR, POC: YELLOW
UROBILINOGEN, POC: ABNORMAL

## 2023-09-26 PROCEDURE — 1123F ACP DISCUSS/DSCN MKR DOCD: CPT | Performed by: INTERNAL MEDICINE

## 2023-09-26 PROCEDURE — 99214 OFFICE O/P EST MOD 30 MIN: CPT | Performed by: INTERNAL MEDICINE

## 2023-09-26 PROCEDURE — 3078F DIAST BP <80 MM HG: CPT | Performed by: INTERNAL MEDICINE

## 2023-09-26 PROCEDURE — 81003 URINALYSIS AUTO W/O SCOPE: CPT | Performed by: INTERNAL MEDICINE

## 2023-09-26 PROCEDURE — 3077F SYST BP >= 140 MM HG: CPT | Performed by: INTERNAL MEDICINE

## 2023-09-26 PROCEDURE — PBSHW AMB POC URINALYSIS DIP STICK AUTO W/O MICRO: Performed by: INTERNAL MEDICINE

## 2023-09-26 RX ORDER — NITROFURANTOIN MACROCRYSTALS 100 MG/1
100 CAPSULE ORAL 2 TIMES DAILY
Qty: 10 CAPSULE | Refills: 0 | Status: SHIPPED | OUTPATIENT
Start: 2023-09-26 | End: 2023-10-01

## 2023-09-26 ASSESSMENT — PATIENT HEALTH QUESTIONNAIRE - PHQ9
SUM OF ALL RESPONSES TO PHQ9 QUESTIONS 1 & 2: 0
SUM OF ALL RESPONSES TO PHQ QUESTIONS 1-9: 0
SUM OF ALL RESPONSES TO PHQ QUESTIONS 1-9: 0
2. FEELING DOWN, DEPRESSED OR HOPELESS: 0
SUM OF ALL RESPONSES TO PHQ QUESTIONS 1-9: 0
1. LITTLE INTEREST OR PLEASURE IN DOING THINGS: 0
SUM OF ALL RESPONSES TO PHQ QUESTIONS 1-9: 0

## 2023-09-26 ASSESSMENT — ENCOUNTER SYMPTOMS
BLOOD IN STOOL: 0
ABDOMINAL PAIN: 0

## 2023-09-26 NOTE — PROGRESS NOTES
Dennie Croissant presents today for   Chief Complaint   Patient presents with    Urinary Frequency                 1. \"Have you been to the ER, urgent care clinic since your last visit? Hospitalized since your last visit? \" no    2. \"Have you seen or consulted any other health care providers outside of the [address] [place] [address] since your last visit? \" no     3. For patients aged 43-73: Has the patient had a colonoscopy / FIT/ Cologuard? NA - based on age      If the patient is female:    4. For patients aged 43-66: Has the patient had a mammogram within the past 2 years? NA - based on age or sex      11. For patients aged 21-65: Has the patient had a pap smear?  NA - based on age or sex

## 2023-09-27 NOTE — PROGRESS NOTES
Urinary Frequency   Associated symptoms include frequency. Pertinent negatives include no chills. Kulwant Baker is here for a few weeks of pelvic pressure and urinary frequency. Also a feeling of urinary hesitancy, as she has the urge to urinate shortly after going again. Her daughters and granddaughter have kidney stones, but she has no known history of stones. She has never had a UTI in the past.    Past Medical History:   Diagnosis Date    Allergic rhinitis     Anemia     lifelong;  low fe 9/22, saw Dr Nithin Flores     CAD (coronary artery disease)     BICA<50% (2/11); BICA<50% (9/14)    Cervical spondylosis     Chronic constipation     CKD (chronic kidney disease) 2022    Colon cancer ([address] [facility]) 1989    s/p partial colon resection    Colon polyp 09/06/2019    Dr Raphael Tsai    FHx: heart disease     GERD (gastroesophageal reflux disease)     H/O cardiovascular stress test     negative, EF 78%, no wma  (1/09); NST low risk, tid 0.87 (12/22)    Hyperlipidemia     Hypertension     Insomnia 11/01/2016    Lumbar spinal stenosis     s/p decomp Dr Weyman Seip    Microscopic hematuria     neg cysto Dr Jessica Isbell 2013    Osteopenia     DEXA t score -0.6 spine, -1.9 hip (1/12); -0.6 spine, -1.8 hip (5/14); -1.9 spine, -2.2 hpi FRAX 12/2.8 (11/20)    Vascular abnormality     RABI 1.21, LABI 1.22 (2.15)        Past Surgical History:   Procedure Laterality Date    ANKLE FRACTURE SURGERY Left 02/2023    Dr Elenita Bhatia    BUNIONECTOMY Bilateral     COLECTOMY      for colon cancer    COLONOSCOPY N/A 06/03/2016    neg (11/11);  Dr Navi Mireles (6/3/16) neg; Dr Martin Jin (9/6/19) polyp    CYSTOURETHROSCOPY  06/2013    Dr Jessica Isbell neg    Marlea Negus (CERVIX STATUS UNKNOWN)      LUMBAR FUSION  07/2015    Dr Weyman Seip L4-5 decomp and fusion     TONSILLECTOMY      UPPER GASTROINTESTINAL ENDOSCOPY  12/20/2021             Current Outpatient Medications   Medication Sig Dispense Refill    nitrofurantoin (MACRODANTIN) 100 MG

## 2023-09-28 ENCOUNTER — TELEPHONE (OUTPATIENT)
Age: 78
End: 2023-09-28

## 2023-09-28 LAB — RESULT: NORMAL

## 2023-09-28 NOTE — TELEPHONE ENCOUNTER
Bacteria grew from her urine.   If she still having symptoms, you can set her up with me to do a pelvic exam

## 2023-10-03 RX ORDER — IPRATROPIUM BROMIDE 21 UG/1
SPRAY, METERED NASAL
Qty: 30 ML | Refills: 3 | Status: SHIPPED | OUTPATIENT
Start: 2023-10-03

## 2023-10-04 DIAGNOSIS — R30.0 DYSURIA: ICD-10-CM

## 2023-10-06 LAB — RESULT: NORMAL

## 2023-10-09 ENCOUNTER — TELEPHONE (OUTPATIENT)
Age: 78
End: 2023-10-09

## 2023-10-09 NOTE — TELEPHONE ENCOUNTER
Patient called and states she had done a urine sample last week and saw Dr. Aide Lewis regarding this issue. Patient states she just got the results back and would like to speak to the nurse due to some questions she had.     Please call patient back at   [phone]

## 2023-10-09 NOTE — TELEPHONE ENCOUNTER
Pls call  Urine cx neg (contaminated sample)  Dr Jeri Park had suggested that if she has persistent sx, she could do gyn exam or we can send to gynecologist

## 2023-10-10 NOTE — TELEPHONE ENCOUNTER
Patient reached and given message, she has some further questions and would like to speak with a nurse and can be reached at [phone]. Please advise, thank you.

## 2023-10-10 NOTE — TELEPHONE ENCOUNTER
Patient states she does not need a referral for GYN right now but will call back if she needs a referral.

## 2023-10-31 ENCOUNTER — NURSE ONLY (OUTPATIENT)
Age: 78
End: 2023-10-31

## 2023-10-31 DIAGNOSIS — I10 PRIMARY HYPERTENSION: ICD-10-CM

## 2023-10-31 DIAGNOSIS — M80.00XA AGE-RELATED OSTEOPOROSIS WITH CURRENT PATHOLOGICAL FRACTURE, INITIAL ENCOUNTER: ICD-10-CM

## 2023-11-01 LAB
ANION GAP SERPL CALCULATED.3IONS-SCNC: 13 MMOL/L (ref 3–15)
BASOPHILS # BLD: 1 % (ref 0–2)
BASOPHILS ABSOLUTE: 0.1 K/UL (ref 0–0.2)
BUN BLDV-MCNC: 28 MG/DL (ref 6–22)
CALCIUM SERPL-MCNC: 9.3 MG/DL (ref 8.4–10.5)
CHLORIDE BLD-SCNC: 104 MMOL/L (ref 98–110)
CO2: 24 MMOL/L (ref 20–32)
CREAT SERPL-MCNC: 1.2 MG/DL (ref 0.8–1.4)
EOSINOPHIL # BLD: 3 % (ref 0–6)
EOSINOPHILS ABSOLUTE: 0.2 K/UL (ref 0–0.5)
GLOMERULAR FILTRATION RATE: 44.6 ML/MIN/1.73 SQ.M.
GLUCOSE: 92 MG/DL (ref 70–99)
HCT VFR BLD CALC: 33.6 % (ref 35.1–48.3)
HEMOGLOBIN: 10.7 G/DL (ref 11.7–16.1)
LYMPHOCYTES # BLD: 27 % (ref 20–45)
LYMPHOCYTES ABSOLUTE: 1.9 K/UL (ref 1–4.8)
MCH RBC QN AUTO: 29 PG (ref 26–34)
MCHC RBC AUTO-ENTMCNC: 32 G/DL (ref 31–36)
MCV RBC AUTO: 92 FL (ref 80–99)
MONOCYTES ABSOLUTE: 0.7 K/UL (ref 0.1–1)
MONOCYTES: 10 % (ref 3–12)
NEUTROPHILS ABSOLUTE: 4.1 K/UL (ref 1.8–7.7)
NEUTROPHILS: 59 % (ref 40–75)
PDW BLD-RTO: 14.2 % (ref 10–15.5)
PLATELET # BLD: 208 K/UL (ref 140–440)
PMV BLD AUTO: 11 FL (ref 9–13)
POTASSIUM SERPL-SCNC: 5.7 MMOL/L (ref 3.5–5.5)
RBC: 3.65 M/UL (ref 3.8–5.2)
SODIUM BLD-SCNC: 141 MMOL/L (ref 133–145)
VITAMIN D 25-HYDROXY: 43.7 NG/ML (ref 32–100)
WBC: 7 K/UL (ref 4–11)

## 2023-11-03 NOTE — PROGRESS NOTES
Panel   Result Value Ref Range    Potassium 5.7 (H) 3.5 - 5.5 mmol/L    Sodium 141 133 - 145 mmol/L    Chloride 104 98 - 110 mmol/L    Glucose 92 70 - 99 mg/dL    Calcium 9.3 8.4 - 10.5 mg/dL    BUN 28 (H) 6 - 22 mg/dL    Creatinine 1.2 0.8 - 1.4 mg/dL    CO2 24 20 - 32 mmol/L    Glomerular Filtration Rate 44.6 (L) >60.0 mL/min/1.73 sq.m. Anion Gap 13.0 3.0 - 15.0 mmol/L   Results for orders placed or performed in visit on 10/04/23 (from the past 2160 hour(s))   Culture, Urine    Specimen: Urine, clean catch   Result Value Ref Range    Result Normal    Results for orders placed or performed in visit on 09/26/23 (from the past 2160 hour(s))   Culture, Urine   Result Value Ref Range    Result Normal    AMB POC URINALYSIS DIP STICK AUTO W/O MICRO   Result Value Ref Range    Color, Urine, POC Yellow     Clarity, Urine, POC Clear     Glucose, Urine, POC Negative Negative    Bilirubin, Urine, POC Negative Negative    Ketones, Urine, POC Negative Negative    Specific Gravity, Urine, POC 5.5 (A) 1.001 - 1.035    Blood, Urine, POC Trace-intact Negative    pH, Urine, POC 5.5 4.6 - 8.0    Protein, Urine, POC Negative Negative    Urobilinogen, POC 0.2 mg/dL     Nitrite, Urine, POC Negative Negative    Leukocyte Esterase, Urine, POC Negative Negative     We reviewed the patient's labs from the last several visits to point out trends in the numbers        Patient Active Problem List   Diagnosis    Chronic anemia    Hyperlipidemia    Anxiety    Advance directive on file    Primary hypertension    History of colon cancer    Osteopenia    GERD (gastroesophageal reflux disease)    Lumbar spinal stenosis    Allergic rhinitis    CKD (chronic kidney disease)         Assessment and plan:  1. Osteopenia and now with ankle fx. Weight bearing exercise, Ca/d, get DEXA, most interested in infusions with h/o gerd  2. GERD. PPI and avoidance measures  3. CKD. Consider changing out the cozaar. Check renal US and Upr/cr  4. HTN.

## 2023-11-07 ENCOUNTER — OFFICE VISIT (OUTPATIENT)
Age: 78
End: 2023-11-07
Payer: MEDICARE

## 2023-11-07 VITALS
TEMPERATURE: 97.4 F | BODY MASS INDEX: 21.14 KG/M2 | DIASTOLIC BLOOD PRESSURE: 60 MMHG | OXYGEN SATURATION: 99 % | HEART RATE: 76 BPM | RESPIRATION RATE: 14 BRPM | SYSTOLIC BLOOD PRESSURE: 114 MMHG | HEIGHT: 61 IN | WEIGHT: 112 LBS

## 2023-11-07 DIAGNOSIS — N95.9 MENOPAUSAL AND PERIMENOPAUSAL DISORDER: ICD-10-CM

## 2023-11-07 DIAGNOSIS — N18.32 STAGE 3B CHRONIC KIDNEY DISEASE (HCC): Primary | ICD-10-CM

## 2023-11-07 DIAGNOSIS — D64.9 ANEMIA, UNSPECIFIED TYPE: ICD-10-CM

## 2023-11-07 PROCEDURE — 3074F SYST BP LT 130 MM HG: CPT | Performed by: INTERNAL MEDICINE

## 2023-11-07 PROCEDURE — 99214 OFFICE O/P EST MOD 30 MIN: CPT | Performed by: INTERNAL MEDICINE

## 2023-11-07 PROCEDURE — 3078F DIAST BP <80 MM HG: CPT | Performed by: INTERNAL MEDICINE

## 2023-11-07 PROCEDURE — 1123F ACP DISCUSS/DSCN MKR DOCD: CPT | Performed by: INTERNAL MEDICINE

## 2023-11-16 ENCOUNTER — HOSPITAL ENCOUNTER (OUTPATIENT)
Facility: HOSPITAL | Age: 78
Discharge: HOME | End: 2023-11-16
Attending: INTERNAL MEDICINE
Payer: MEDICARE

## 2023-11-16 ENCOUNTER — HOSPITAL ENCOUNTER (OUTPATIENT)
Facility: HOSPITAL | Age: 78
End: 2023-11-16
Attending: INTERNAL MEDICINE
Payer: MEDICARE

## 2023-11-16 ENCOUNTER — HOSPITAL ENCOUNTER (OUTPATIENT)
Facility: HOSPITAL | Age: 78
Discharge: HOME | End: 2023-11-19

## 2023-11-16 DIAGNOSIS — N18.32 STAGE 3B CHRONIC KIDNEY DISEASE (HCC): ICD-10-CM

## 2023-11-16 DIAGNOSIS — N95.9 MENOPAUSAL AND PERIMENOPAUSAL DISORDER: ICD-10-CM

## 2023-11-16 LAB — SENTARA SPECIMEN COLLECTION: NORMAL

## 2023-11-16 PROCEDURE — 77080 DXA BONE DENSITY AXIAL: CPT

## 2023-11-16 PROCEDURE — 76770 US EXAM ABDO BACK WALL COMP: CPT

## 2023-11-17 LAB
ANION GAP SERPL CALCULATED.3IONS-SCNC: 9 MMOL/L (ref 3–15)
BUN BLDV-MCNC: 18 MG/DL (ref 6–22)
CALCIUM SERPL-MCNC: 9.5 MG/DL (ref 8.4–10.5)
CHLORIDE BLD-SCNC: 101 MMOL/L (ref 98–110)
CO2: 29 MMOL/L (ref 20–32)
CREAT SERPL-MCNC: 1 MG/DL (ref 0.8–1.4)
CREATININE URINE: 151 MG/DL
GLOMERULAR FILTRATION RATE: 54.7 ML/MIN/1.73 SQ.M.
GLUCOSE: 79 MG/DL (ref 70–99)
POTASSIUM SERPL-SCNC: 4.4 MMOL/L (ref 3.5–5.5)
PROTEIN, TOTAL URINE: 13 MG/DL
PROTEIN/CREAT RATIO: 0.09
SODIUM BLD-SCNC: 139 MMOL/L (ref 133–145)

## 2023-12-03 ENCOUNTER — TELEPHONE (OUTPATIENT)
Age: 78
End: 2023-12-03

## 2023-12-03 DIAGNOSIS — M81.0 OSTEOPOROSIS, UNSPECIFIED OSTEOPOROSIS TYPE, UNSPECIFIED PATHOLOGICAL FRACTURE PRESENCE: Primary | ICD-10-CM

## 2023-12-04 NOTE — TELEPHONE ENCOUNTER
Pls call    US neg    DEXA shows osteoporosis  [facility] rheumatology Dr Kevne Grady

## 2023-12-07 ENCOUNTER — FOLLOW UP (OUTPATIENT)
Dept: URBAN - METROPOLITAN AREA CLINIC 1 | Facility: CLINIC | Age: 78
End: 2023-12-07

## 2023-12-07 DIAGNOSIS — H04.123: ICD-10-CM

## 2023-12-07 PROCEDURE — 99213 OFFICE O/P EST LOW 20 MIN: CPT

## 2023-12-07 ASSESSMENT — KERATOMETRY
OD_AXISANGLE_DEGREES: 4
OD_AXISANGLE2_DEGREES: 094
OD_K2POWER_DIOPTERS: 44.50
OD_K1POWER_DIOPTERS: 43.25
OS_K2POWER_DIOPTERS: 44.00
OS_AXISANGLE_DEGREES: 170
OS_AXISANGLE2_DEGREES: 080
OS_K1POWER_DIOPTERS: 43.25

## 2023-12-07 ASSESSMENT — VISUAL ACUITY
OD_SC: 20/20
OS_SC: J1+

## 2023-12-07 ASSESSMENT — TONOMETRY
OS_IOP_MMHG: 11
OD_IOP_MMHG: 12

## 2023-12-27 ENCOUNTER — TELEPHONE (OUTPATIENT)
Facility: HOSPITAL | Age: 78
End: 2023-12-27

## 2023-12-27 NOTE — TELEPHONE ENCOUNTER
Patient cancelled appt. on 12/27/23 at 11:04am due to the insurance under [facility] E-Rejecting and the patient wanted to wait until the new year when her plan changes to [facility], r/s to 1/15/24.

## 2024-01-15 ENCOUNTER — HOSPITAL ENCOUNTER (OUTPATIENT)
Facility: HOSPITAL | Age: 79
Setting detail: RECURRING SERIES
Discharge: HOME | End: 2024-01-18
Payer: MEDICARE

## 2024-01-15 PROCEDURE — 97161 PT EVAL LOW COMPLEX 20 MIN: CPT

## 2024-01-15 NOTE — PROGRESS NOTES
[facility]  [address], [address], [place] [address]  Ph:[phone] Fx:[phone]  Plan of Care / Statement of Necessity for Physical Therapy Services     Patient Name: Jeniffer Mcnair : 1945   Medical   Diagnosis: Left ankle pain [M25.572] Treatment Diagnosis: M25.572  LEFT ANKLE PAIN     Onset Date: 2023 after fx, ORIF 2023     Referral Source: Dasha Rhodes PA Start of Care (SOC): 1/15/2024   Prior Hospitalization: See medical history Provider #: [id number]   Prior Level of Function: Indep, drive, retired, only one fall   Comorbidities: PMH of a anemia, anxiety, CAD, cervical spondylosis, CKD, colon cancer, GERD, HLD, HTN, lumbar stenosis and osteopenia      Assessment / key information:  Pt is a pleasant 78 year old female with hx of ORIF L ankle now presents with ongoing pain and stiffness, wants to improve her functional mobility, pain, ROM and balance.  Initially fell from fainting, no other falls, indep comm ambulator.     Not post operative    Evaluation Complexity:  History:  LOW Complexity : Zero comorbidities / personal factors that will impact the outcome / POC; Examination:  LOW Complexity : 1-2 Standardized tests and measures addressing body structure, function, activity limitation and / or participation in recreation  ;Presentation:  LOW Complexity : Stable, uncomplicated  ;Clinical Decision Making:  LOW Complexity : FOTO score of  FOTO score = an established functional score where 100 = no disability  Overall Complexity Rating: LOW   Problem List    Short Term Goals:     Short Term Goals: (To be accomplished in 4 weeks)  - Goal 1: Pt will be independent/compliant with HEP to improve ability to independently address symptoms and functional deficits.  Status at last note/certification: Established and reviewed with Pt, HEP issued at time of eval.      - Goal 2: Pt will be able to demo B ankle DF and PF to progress 
Severe    (R) Tightness [] WNL   [x] Min   [] Mod   [] Severe  Palpation:   Location: ALONG l BUNION   Patient's Pain Response: [x] Min   [] Mod   [] Severe    Optional Tests:  Balance/Stork Test: touches/60sec (L): (R):    Sub-talor alignment: [x] Neutral     [] Pronation      [] Supination    Forefoot alignment:  [x] Neutral     [] Varus            [] Valgus    Swelling: NONE    Anterior Drawer: [x] Neg    [] Pos  Posterior Drawer:  [x] Neg    [] Pos  Archibald Test: [x] Neg    [] Pos      Patient will continue to benefit from skilled PT / OT services to modify and progress therapeutic interventions, analyze and address functional mobility deficits, analyze and address ROM deficits, analyze and address strength deficits, analyze and address soft tissue restrictions, analyze and cue for proper movement patterns, analyze and modify for postural abnormalities, and instruct in home and community integration to address functional deficits and attain remaining goals.    Progress toward goals / Updated goals:  [x]  See EVAL/POC Progress Note/Re-certification  Short Term Goals: (To be accomplished in 4 weeks)  - Goal 1: Pt will be independent/compliant with HEP to improve ability to independently address symptoms and functional deficits.  Status at last note/certification: Established and reviewed with Pt, HEP issued at time of eval.     - Goal 2: Pt will be able to demo B ankle DF and PF to progress dynamic activities.   Status at last note/certification: EVAl: see POC,above    Long Term Goals: (To be accomplished in 8-12 weeks)  - Goal1: Pt will be able to improve FOTO score to > 71 to demo functional Improvement from HEP and rehab  Status at last note/certification: scored 69 at eval 1/15/24    - Goal 2: Pt will  report no pain with reciprocal stair negotiation at home with single HR  Status at last note/certification: EVAL: pain and decreased eccentric control, needed SUP and B HR on 8\" step to descend     - Goal 3: Pt

## 2024-01-16 ENCOUNTER — TELEPHONE (OUTPATIENT)
Age: 79
End: 2024-01-16

## 2024-01-16 DIAGNOSIS — M81.0 OSTEOPOROSIS WITHOUT CURRENT PATHOLOGICAL FRACTURE, UNSPECIFIED OSTEOPOROSIS TYPE: Primary | ICD-10-CM

## 2024-01-16 NOTE — TELEPHONE ENCOUNTER
Pt called with a name of a rheumatologist she would like to be referred to   [facility]  [facility]    [place]   Dr Remberto Villarreal or Dr Karthikeyan Hewitt  Phone # [phone]  Fax# [phone]

## 2024-02-01 ENCOUNTER — HOSPITAL ENCOUNTER (OUTPATIENT)
Facility: HOSPITAL | Age: 79
Setting detail: RECURRING SERIES
Discharge: HOME | End: 2024-02-04
Payer: MEDICARE

## 2024-02-01 PROCEDURE — 97110 THERAPEUTIC EXERCISES: CPT

## 2024-02-01 PROCEDURE — 97112 NEUROMUSCULAR REEDUCATION: CPT

## 2024-02-01 NOTE — PROGRESS NOTES
PHYSICAL / OCCUPATIONAL THERAPY - DAILY TREATMENT NOTE    Patient Name: Jeniffer Mcnair    Date: 2024    : 1945  Ins[facility]: [facility] / Plan: [facility] PRIME (HMO) / Product Type: *No Product type* /      Patient  verified Yes     Visit #   Current / Total 2 16   Time   In / Out 1130 1211   Pain   In / Out 1/10 0   Subjective Functional Status/Changes: I have been doing my exercises this weekend     TREATMENT AREA =  Left ankle pain [M25.572]    OBJECTIVE  Therapeutic Procedures:    Tx Min Billable or 1:1 Min (if diff from Tx Min) Procedure, Rationale, Specifics   25  [id number] Therapeutic Exercise (timed):  increase ROM, strength, coordination, balance, and proprioception to improve patient's ability to progress to PLOF and address remaining functional goals. (see flow sheet as applicable)     Details if applicable:       16  48888 Neuromuscular Re-Education (timed):  improve balance, coordination, kinesthetic sense, posture, core stability and proprioception to improve patient's ability to develop conscious control of individual muscles and awareness of position of extremities in order to progress to PLOF and address remaining functional goals. (see flow sheet as applicable)     Details if applicable:  SLS on foam LLE , quite stable no overt LOB.   High [place] walking 2 laps with cues for slowing down and trying to maintain longer time in stance phase.      41 41 [facility] Totals Reminder: bill using total billable min of TIMED therapeutic procedures (example: do not include dry needle or estim unattended, both untimed codes, in totals to left)  8-22 min = 1 unit; 23-37 min = 2 units; 38-52 min = 3 units; 53-67 min = 4 units; 68-82 min = 5 units   Total Total     [x]  Patient Education billed concurrently with other procedures   [x] Review HEP    [] Progressed/Changed HEP, detail: added toe walking at home PRN.   [] Other detail:       Objective Information/Functional

## 2024-02-04 RX ORDER — IPRATROPIUM BROMIDE 21 UG/1
SPRAY, METERED NASAL
Qty: 30 ML | Refills: 3 | Status: SHIPPED | OUTPATIENT
Start: 2024-02-04

## 2024-02-08 ENCOUNTER — TELEPHONE (OUTPATIENT)
Age: 79
End: 2024-02-08

## 2024-02-08 ENCOUNTER — TELEPHONE (OUTPATIENT)
Facility: HOSPITAL | Age: 79
End: 2024-02-08

## 2024-02-08 DIAGNOSIS — U07.1 COVID-19 VIRUS INFECTION: Primary | ICD-10-CM

## 2024-02-08 NOTE — TELEPHONE ENCOUNTER
Pt tested positive for covid today ,she said her symptoms started 02/06 she is asking for paxlovid to be sent to her pharmacy   [facility] [address] [address]

## 2024-02-08 NOTE — TELEPHONE ENCOUNTER
Patient cancelled appt. on 2/8/24 at 8:05am due to testing positive for Covid on this morning & she is not able to make her appt. tomorrow or Monday, 2/12/24.

## 2024-02-09 ENCOUNTER — APPOINTMENT (OUTPATIENT)
Facility: HOSPITAL | Age: 79
End: 2024-02-09
Payer: MEDICARE

## 2024-02-12 ENCOUNTER — APPOINTMENT (OUTPATIENT)
Facility: HOSPITAL | Age: 79
End: 2024-02-12
Payer: MEDICARE

## 2024-02-12 ENCOUNTER — TELEPHONE (OUTPATIENT)
Age: 79
End: 2024-02-12

## 2024-02-12 RX ORDER — AMLODIPINE BESYLATE 2.5 MG/1
TABLET ORAL
Qty: 90 TABLET | Refills: 3 | Status: SHIPPED | OUTPATIENT
Start: 2024-02-12

## 2024-02-12 NOTE — TELEPHONE ENCOUNTER
Patient called stating that she is on her last dose of paxlovid but it make her sick to her stomach and would like to know of she can skip the last dose.Please Advise

## 2024-02-15 ENCOUNTER — HOSPITAL ENCOUNTER (OUTPATIENT)
Facility: HOSPITAL | Age: 79
Setting detail: RECURRING SERIES
Discharge: HOME | End: 2024-02-18
Payer: MEDICARE

## 2024-02-15 PROCEDURE — 97530 THERAPEUTIC ACTIVITIES: CPT

## 2024-02-15 PROCEDURE — 97110 THERAPEUTIC EXERCISES: CPT

## 2024-02-15 PROCEDURE — 97140 MANUAL THERAPY 1/> REGIONS: CPT

## 2024-02-15 NOTE — PROGRESS NOTES
[facility]  [address], [address], [place] [address]  Ph:[phone] Fx:[phone]  PHYSICAL THERAPY PROGRESS NOTE  Patient Name: Jeniffer Mcnair : 1945   Treatment/Medical Diagnosis: Left ankle pain [M25.572]   Referral Source: Dasha Rhodes PA     Date of Initial Visit: 1/15/24 Attended Visits: 3 Missed Visits: 0     SUMMARY OF TREATMENT   PT intervention has consisted of therapeutic, exercises functional activities, manual therapy, neuromuscular re-education,HEP to improve pain, mobility,flexibility, and global strength to improve pt's ability to squat,bend, and lift increasing pt's ability to perform ADL's and occupational demands.  utilized for pain management.     CURRENT STATUS    Mrs Mcnair has been seen for 3 treatments and has shown good progress with PT. Pt reports 75% improvement since initial eval. FOTO has improved from 69 to 76 which shows improved in functional mobility.  Pain level on average is 1/10. Pt is able to perform household chores, walk and standing long period, ascend stairs. Pt's functional deficits are pain when stepping back with left foot, squatting, descending stairs.    Progress Towards Goals  - Goal 1: Pt will be independent/compliant with HEP to improve ability to independently address symptoms and functional deficits.  Status at last note/certification: Established and reviewed with Pt, HEP issued at time of eval.   24 MET, does exercises daily     - Goal 2: Pt will be able to demo B ankle DF and PF to progress dynamic activities.   Status at last note/certification: EVAL: see POC,above  Current:demos good AROM at left foot     Long Term Goals: (To be accomplished in 8-12 weeks)  - Goal1: Pt will be able to improve FOTO score to > 71 to demo functional Improvement from HEP and rehab  Status at last note/certification: scored 69 at eval 1/15/24  Current:  76 met      - Goal 2: Pt will  report no pain with

## 2024-02-15 NOTE — PROGRESS NOTES
PHYSICAL / OCCUPATIONAL THERAPY - DAILY TREATMENT NOTE    Patient Name: Jeniffer Mcnair    Date: 2/15/2024    : 1945  [facility]: [facility] / Plan: [facility] PRIME (HMO) / Product Type: *No Product type* /      Patient  verified Yes     Visit #   Current / Total 3 16   Time   In / Out 10:11 11:02   Pain   In / Out 0 0   Subjective Functional Status/Changes: \"Im feeling pretty good.\"     TREATMENT AREA =  Left ankle pain [M25.572]    OBJECTIVE    Modalities Rationale:     increase tissue extensibility to improve patient's ability to progress to PLOF and address remaining functional goals.     min [] Estim Unattended, type/location:                                      []  w/ice    []  w/heat    min [] Estim Attended, type/location:                                     []  w/US     []  w/ice    []  w/heat    []  TENS insruct      min []  Mechanical Traction: type/lbs                   []  pro   []  sup   []  int   []  cont    []  before manual    []  after manual    min []  Ultrasound, settings/location:     10 min  unbill []  Ice     [x]  Heat    location/position: Long sit  left ankle    min []  Paraffin,  details:     min []  Vasopneumatic Device, press/temp:     min []  Whirlpool / Fluido:    If using vaso (only need to measure limb vaso being performed on)      pre-treatment girth :       post-treatment girth :       measured at (landmark location) :      min []  Other:    Skin assessment post-treatment:   Intact      Therapeutic Procedures:    Tx Min Billable or 1:1 Min (if diff from Tx Min) Procedure, Rationale, Specifics   10  09665 Therapeutic Exercise (timed):  increase ROM, strength, coordination, balance, and proprioception to improve patient's ability to progress to PLOF and address remaining functional goals. (see flow sheet as applicable)     Details if applicable:       72 40175 Therapeutic Activity (timed):  use of dynamic activities replicating functional movements to

## 2024-02-20 ENCOUNTER — APPOINTMENT (OUTPATIENT)
Facility: HOSPITAL | Age: 79
End: 2024-02-20
Payer: MEDICARE

## 2024-02-23 ENCOUNTER — HOSPITAL ENCOUNTER (OUTPATIENT)
Facility: HOSPITAL | Age: 79
Setting detail: RECURRING SERIES
Discharge: HOME | End: 2024-02-26
Payer: MEDICARE

## 2024-02-23 PROCEDURE — 97530 THERAPEUTIC ACTIVITIES: CPT

## 2024-02-23 PROCEDURE — 97140 MANUAL THERAPY 1/> REGIONS: CPT

## 2024-02-23 PROCEDURE — 97110 THERAPEUTIC EXERCISES: CPT

## 2024-02-23 NOTE — PROGRESS NOTES
goals.    Progress toward goals / Updated goals:  []  See Progress Note/Recertification   Goal 1: Pt will be independent/compliant with HEP to improve ability to independently address symptoms and functional deficits.  Status at last note/certification: Established and reviewed with Pt, HEP issued at time of eval.   2/1/24 MET, does exercises daily     - Goal 2: Pt will be able to demo B ankle DF and PF to progress dynamic activities.   Status at last note/certification: EVAL: see POC,above  Current:demos good AROM at left foot     Long Term Goals: (To be accomplished in 8-12 weeks)  - Goal1: Pt will be able to improve FOTO score to > 71 to demo functional Improvement from HEP and rehab  Status at last note/certification: scored 69 at eval 1/15/24  Current:  76 met      - Goal 2: Pt will report no pain with reciprocal stair negotiation at home with single HR  Status at last note/certification: EVAL: pain and decreased eccentric control, needed SUP and B HR on 8\" step to descend   Current: pt required cues on sequencing stairs with reciprocal pattern with use of (B) HR     - Goal 3: Pt will demonstrate 5/5 L hip and ankle strength to normalize gait pattern   Status at last note/certification: 5/5 RLE, grossly 4+/5 LLE   Current:  4+  progressing         Next PN/ RC due 3/15/24 RC  Auth due (visit number/ date) 15 visits   expires 3/14/24    PLAN  - Continue Plan of Care    Treasure Morales, PTA    2/23/2024    10:27 AM    Future Appointments   Date Time Provider Department Center   2/27/2024  9:45 AM IO LAB VISIT [facility]   3/5/2024 10:40 AM Alban Phan MD [facility]

## 2024-02-27 ENCOUNTER — NURSE ONLY (OUTPATIENT)
Age: 79
End: 2024-02-27

## 2024-02-27 ENCOUNTER — TELEPHONE (OUTPATIENT)
Age: 79
End: 2024-02-27

## 2024-02-27 ENCOUNTER — HOSPITAL ENCOUNTER (OUTPATIENT)
Facility: HOSPITAL | Age: 79
Setting detail: RECURRING SERIES
Discharge: HOME | End: 2024-03-01
Payer: MEDICARE

## 2024-02-27 DIAGNOSIS — N18.32 STAGE 3B CHRONIC KIDNEY DISEASE (HCC): ICD-10-CM

## 2024-02-27 DIAGNOSIS — E78.5 HYPERLIPIDEMIA, UNSPECIFIED HYPERLIPIDEMIA TYPE: ICD-10-CM

## 2024-02-27 DIAGNOSIS — D64.9 CHRONIC ANEMIA: ICD-10-CM

## 2024-02-27 DIAGNOSIS — N18.31 STAGE 3A CHRONIC KIDNEY DISEASE (HCC): Primary | ICD-10-CM

## 2024-02-27 DIAGNOSIS — I10 PRIMARY HYPERTENSION: ICD-10-CM

## 2024-02-27 PROCEDURE — 97110 THERAPEUTIC EXERCISES: CPT

## 2024-02-27 PROCEDURE — 97140 MANUAL THERAPY 1/> REGIONS: CPT

## 2024-02-27 PROCEDURE — 97530 THERAPEUTIC ACTIVITIES: CPT

## 2024-02-27 NOTE — PROGRESS NOTES
PHYSICAL / OCCUPATIONAL THERAPY - DAILY TREATMENT NOTE    Patient Name: Jeniffer Mcnair    Date: 2024    : 1945  Insurance: Payor: [employer] MEDICARE / Plan: [facility] (HMO) / Product Type: *No Product type* /      Patient  verified Yes     Visit #   Current / Total 5 16   Time   In / Out 1249 pm  144 pm    Pain   In / Out 0/10  0/10    Subjective Functional Status/Changes: Patient reports that her foot continues to get stronger. Patient denies any pain today.      TREATMENT AREA =  Left ankle pain [M25.572]    OBJECTIVE    Modalities Rationale:     decrease pain and increase tissue extensibility to improve patient's ability to progress to PLOF and address remaining functional goals.     min [] Estim Unattended, type/location:                                      []  w/ice    []  w/heat    min [] Estim Attended, type/location:                                     []  w/US     []  w/ice    []  w/heat    []  TENS insruct      min []  Mechanical Traction: type/lbs                   []  pro   []  sup   []  int   []  cont    []  before manual    []  after manual    min []  Ultrasound, settings/location:     10 min  unbill []  Ice     [x]  Heat    location/position: Reclined to left foot     min []  Paraffin,  details:     min []  Vasopneumatic Device, press/temp:     min []  Whirlpool / Fluido:    If using vaso (only need to measure limb vaso being performed on)      pre-treatment girth :       post-treatment girth :       measured at (landmark location) :      min []  Other:    Skin assessment post-treatment:   Intact      Therapeutic Procedures:    Tx Min Billable or 1:1 Min (if diff from Tx Min) Procedure, Rationale, Specifics     53692 Therapeutic Exercise (timed):  increase ROM, strength, coordination, balance, and proprioception to improve patient's ability to progress to PLOF and address remaining functional goals. (see flow sheet as applicable)     Details if applicable:       72 [id number]

## 2024-02-28 LAB
A/G RATIO: 1.7 RATIO (ref 1.1–2.6)
ALBUMIN SERPL-MCNC: 4.4 G/DL (ref 3.5–5)
ALP BLD-CCNC: 104 U/L (ref 40–120)
ALT SERPL-CCNC: 19 U/L (ref 5–40)
ANION GAP SERPL CALCULATED.3IONS-SCNC: 10 MMOL/L (ref 3–15)
AST SERPL-CCNC: 19 U/L (ref 10–37)
BILIRUB SERPL-MCNC: 0.5 MG/DL (ref 0.2–1.2)
BUN BLDV-MCNC: 16 MG/DL (ref 6–22)
CALCIUM SERPL-MCNC: 9.2 MG/DL (ref 8.4–10.5)
CHLORIDE BLD-SCNC: 101 MMOL/L (ref 98–110)
CHOLESTEROL/HDL RATIO: 3.2 (ref 0–5)
CHOLESTEROL: 183 MG/DL (ref 110–200)
CO2: 28 MMOL/L (ref 20–32)
CREAT SERPL-MCNC: 0.9 MG/DL (ref 0.8–1.4)
GLOBULIN: 2.6 G/DL (ref 2–4)
GLOMERULAR FILTRATION RATE: >60 ML/MIN/1.73 SQ.M.
GLUCOSE: 84 MG/DL (ref 70–99)
HCT VFR BLD CALC: 35.1 % (ref 35.1–48.3)
HDLC SERPL-MCNC: 58 MG/DL
HEMOGLOBIN: 10.8 G/DL (ref 11.7–16.1)
LDL CHOLESTEROL CALCULATED: 112 MG/DL (ref 50–99)
LDL/HDL RATIO: 1.9
MCH RBC QN AUTO: 29 PG (ref 26–34)
MCHC RBC AUTO-ENTMCNC: 31 G/DL (ref 31–36)
MCV RBC AUTO: 93 FL (ref 80–99)
NON-HDL CHOLESTEROL: 125 MG/DL
PDW BLD-RTO: 13.7 % (ref 10–15.5)
PLATELET # BLD: 249 K/UL (ref 140–440)
PMV BLD AUTO: 10.7 FL (ref 9–13)
POTASSIUM SERPL-SCNC: 4.3 MMOL/L (ref 3.5–5.5)
RBC: 3.79 M/UL (ref 3.8–5.2)
SODIUM BLD-SCNC: 139 MMOL/L (ref 133–145)
TOTAL PROTEIN: 7 G/DL (ref 6.2–8.1)
TRIGL SERPL-MCNC: 65 MG/DL (ref 40–149)
VLDLC SERPL CALC-MCNC: 13 MG/DL (ref 8–30)
WBC: 4.5 K/UL (ref 4–11)

## 2024-02-28 RX ORDER — ATORVASTATIN CALCIUM 10 MG/1
10 TABLET, FILM COATED ORAL DAILY
Qty: 90 TABLET | Refills: 3 | Status: SHIPPED | OUTPATIENT
Start: 2024-02-28

## 2024-03-04 ENCOUNTER — HOSPITAL ENCOUNTER (OUTPATIENT)
Facility: HOSPITAL | Age: 79
Setting detail: SPECIMEN
Discharge: HOME | End: 2024-03-07

## 2024-03-04 ENCOUNTER — HOSPITAL ENCOUNTER (OUTPATIENT)
Facility: HOSPITAL | Age: 79
Setting detail: RECURRING SERIES
Discharge: HOME | End: 2024-03-07
Payer: MEDICARE

## 2024-03-04 LAB — SENTARA SPECIMEN COLLECTION: NORMAL

## 2024-03-04 PROCEDURE — 97530 THERAPEUTIC ACTIVITIES: CPT

## 2024-03-04 PROCEDURE — 97110 THERAPEUTIC EXERCISES: CPT

## 2024-03-04 PROCEDURE — 97140 MANUAL THERAPY 1/> REGIONS: CPT

## 2024-03-04 NOTE — PROGRESS NOTES
PHYSICAL / OCCUPATIONAL THERAPY - DAILY TREATMENT NOTE    Patient Name: Jeniffer Mcnair    Date: 3/4/2024    : 1945  Ins[facility]: [facility] / Plan: [facility] MED[facility] PRIME (HMO) / Product Type: *No Product type* /      Patient  verified Yes     Visit #   Current / Total 6 16   Time   In / Out 1210 pm  101 pm    Pain   In / Out 0/10  010    Subjective Functional Status/Changes: \"My ankle is feeling pretty good.\"      TREATMENT AREA =  Left ankle pain [M25.572]    OBJECTIVE    Modalities Rationale:     decrease pain and increase tissue extensibility to improve patient's ability to progress to PLOF and address remaining functional goals.     min [] Estim Unattended, type/location:                                      []  w/ice    []  w/heat    min [] Estim Attended, type/location:                                     []  w/US     []  w/ice    []  w/heat    []  TENS insruct      min []  Mechanical Traction: type/lbs                   []  pro   []  sup   []  int   []  cont    []  before manual    []  after manual    min []  Ultrasound, settings/location:     10 min  unbill []  Ice     [x]  Heat    location/position: Reclined to left foot/ankle    min []  Paraffin,  details:     min []  Vasopneumatic Device, press/temp:     min []  Whirlpool / Fluido:    If using vaso (only need to measure limb vaso being performed on)      pre-treatment girth :       post-treatment girth :       measured at (landmark location) :      min []  Other:    Skin assessment post-treatment:   Intact      Therapeutic Procedures:    Tx Min Billable or 1:1 Min (if diff from Tx Min) Procedure, Rationale, Specifics     25805 Therapeutic Exercise (timed):  increase ROM, strength, coordination, balance, and proprioception to improve patient's ability to progress to PLOF and address remaining functional goals. (see flow sheet as applicable)     Details if applicable:         86350 Therapeutic Activity (timed):  use of dynamic

## 2024-03-05 ENCOUNTER — OFFICE VISIT (OUTPATIENT)
Age: 79
End: 2024-03-05
Payer: MEDICARE

## 2024-03-05 VITALS
HEART RATE: 72 BPM | TEMPERATURE: 97.8 F | DIASTOLIC BLOOD PRESSURE: 62 MMHG | HEIGHT: 61 IN | OXYGEN SATURATION: 100 % | BODY MASS INDEX: 21.34 KG/M2 | WEIGHT: 113 LBS | SYSTOLIC BLOOD PRESSURE: 124 MMHG

## 2024-03-05 DIAGNOSIS — Z23 ENCOUNTER FOR IMMUNIZATION: ICD-10-CM

## 2024-03-05 DIAGNOSIS — N18.31 STAGE 3A CHRONIC KIDNEY DISEASE (HCC): ICD-10-CM

## 2024-03-05 DIAGNOSIS — E78.5 HYPERLIPIDEMIA, UNSPECIFIED HYPERLIPIDEMIA TYPE: Primary | ICD-10-CM

## 2024-03-05 DIAGNOSIS — D64.9 CHRONIC ANEMIA: ICD-10-CM

## 2024-03-05 DIAGNOSIS — I10 PRIMARY HYPERTENSION: ICD-10-CM

## 2024-03-05 LAB
CREATININE URINE: 80 MG/DL
PROTEIN, TOTAL URINE: <12 MG/DL
PROTEIN/CREAT RATIO: NORMAL

## 2024-03-05 PROCEDURE — 1123F ACP DISCUSS/DSCN MKR DOCD: CPT | Performed by: INTERNAL MEDICINE

## 2024-03-05 PROCEDURE — 3074F SYST BP LT 130 MM HG: CPT | Performed by: INTERNAL MEDICINE

## 2024-03-05 PROCEDURE — 3078F DIAST BP <80 MM HG: CPT | Performed by: INTERNAL MEDICINE

## 2024-03-05 PROCEDURE — G2211 COMPLEX E/M VISIT ADD ON: HCPCS | Performed by: INTERNAL MEDICINE

## 2024-03-05 PROCEDURE — PBSHW PNEUMOCOCCAL, PCV20, PREVNAR 20, (AGE 6W+), IM, PF: Performed by: INTERNAL MEDICINE

## 2024-03-05 PROCEDURE — 99214 OFFICE O/P EST MOD 30 MIN: CPT | Performed by: INTERNAL MEDICINE

## 2024-03-05 PROCEDURE — 90677 PCV20 VACCINE IM: CPT | Performed by: INTERNAL MEDICINE

## 2024-03-05 RX ORDER — ATORVASTATIN CALCIUM 20 MG/1
20 TABLET, FILM COATED ORAL DAILY
Qty: 90 TABLET | Refills: 3 | Status: SHIPPED | OUTPATIENT
Start: 2024-03-05

## 2024-03-05 ASSESSMENT — PATIENT HEALTH QUESTIONNAIRE - PHQ9
SUM OF ALL RESPONSES TO PHQ9 QUESTIONS 1 & 2: 0
1. LITTLE INTEREST OR PLEASURE IN DOING THINGS: 0
SUM OF ALL RESPONSES TO PHQ QUESTIONS 1-9: 0
2. FEELING DOWN, DEPRESSED OR HOPELESS: 0
SUM OF ALL RESPONSES TO PHQ QUESTIONS 1-9: 0

## 2024-03-05 NOTE — PROGRESS NOTES
Jeniffer Mcnair presents today for   Chief Complaint   Patient presents with    Follow-up                 1. \"Have you been to the ER, urgent care clinic since your last visit?  Hospitalized since your last visit?\" no    2. \"Have you seen or consulted any other health care providers outside of the [facility] since your last visit?\" no     3. For patients aged 45-75: Has the patient had a colonoscopy / FIT/ Cologuard? No      If the patient is female:    4. For patients aged 40-74: Has the patient had a mammogram within the past 2 years? Yes - no Care Gap present      5. For patients aged 21-65: Has the patient had a pap smear? No    
Verbal order read back per Dr. Phan.  Patient received Prevnar 20 vaccine in Left deltoid.  Patient tolerated well and left without complaints.  Patient received VIS.     
daily    losartan (COZAAR) 50 MG tablet Take 1 tablet by mouth once daily    Multiple Vitamins-Minerals (ONE-A-DAY 50 PLUS PO) Take by mouth    turmeric 500 MG CAPS Take by mouth daily    ALPRAZolam (XANAX) 0.25 MG tablet Take 1 tablet by mouth 3 times daily as needed.    ammonium lactate (LAC-HYDRIN) 12 % lotion ceived the following from [facility]: Outside name: ammonium lactate (LAC-HYDRIN) 12 % lotion    aspirin 81 MG EC tablet Take 1 tablet by mouth daily    Calcium Carbonate-Vitamin D 600-3.125 MG-MCG TABS Take by mouth    polyethylene glycol (GLYCOLAX) 17 GM/SCOOP powder Take 17 g by mouth daily     No current facility-administered medications for this visit.     No Known Allergies    REVIEW OF SYSTEMS: colo 9/19 Dr Ochoa, mammo 7/22. DEXA 11/20    Vitals:    03/05/24 1031   BP: 124/62   Site: Left Upper Arm   Position: Sitting   Cuff Size: Small Adult   Pulse: 72   Temp: 97.8 °F (36.6 °C)   TempSrc: Temporal   SpO2: 100%   Weight: 51.3 kg (113 lb)   Height: 1.549 m (5' 1\")   A&O x3  Affect is appropriate.  Mood stable  No apparent distress  Anicteric, no JVD, adenopathy or thyromegaly.   No carotid bruits or radiated murmur  Lungs clear to auscultation, no wheezes or rales  Heart showed regular rate and rhythm. No murmur, rubs, gallops  Abdomen soft nontender, no hepatosplenomegaly or masses.   Extremities without edema.  Pulses 1-2+ symmetrically    LABS  From 3/15 showed   gluc 81, cr 0.82, gfr>60, alt 8,   chol 175, tg 50, hdl 77, ldl-c 88,   wbc 5.0, hb 11.8, plt 194, tsh 3.54, vit d 40.2  From 9/15 showed                            uric 4.6  From 6/16 showed   gluc 81, cr 0.72, gfr>60, alt 16, chol 190, tg 98, hdl 67, ldl-c 103, wbc 5.8, hb 11.7, plt 236, tsh 2.24  From 6/17 showed   gluc 84, cr 0.94, gfr 59,  alt 25, chol 192, tg 66, hdl 75, ldl-c 104, wbc 5.3, hb 11.3, plt 201, tsh 1.30, vit d 35.1  From 3/18 showed   gluc 72, cr 0.94, gfr 58,  alt 30,           wbc 5.3, hb 11.2,

## 2024-03-12 ENCOUNTER — HOSPITAL ENCOUNTER (OUTPATIENT)
Facility: HOSPITAL | Age: 79
Setting detail: RECURRING SERIES
Discharge: HOME | End: 2024-03-15
Payer: MEDICARE

## 2024-03-12 PROCEDURE — 97535 SELF CARE MNGMENT TRAINING: CPT

## 2024-03-12 PROCEDURE — 97530 THERAPEUTIC ACTIVITIES: CPT

## 2024-03-12 PROCEDURE — 97110 THERAPEUTIC EXERCISES: CPT

## 2024-03-12 NOTE — PROGRESS NOTES
PT DISCHARGE DAILY NOTE AND SUMMARY     Date:3/12/2024  Patient name: Jeniffer Mcnair Start of Care: 1/15/24   Referral source: Dasha Rhodes PA : 1945   Medical/Treatment Diagnosis: Left ankle pain [M25.572] Onset Date:2023 after fx, ORIF 2023     Prior Hospitalization: see medical history Provider#: [id number]   Comorbidities: PMH of a anemia, anxiety, CAD, cervical spondylosis, CKD, colon cancer, GERD, HLD, HTN, lumbar stenosis and osteopenia    Prior Level of Function:See medical history   Insurance: Payor: [employer] / Plan: [employer] PRIME (HMO) / Product Type: *No Product type* /              Visits from Start of Care: 6 Missed Visits: 0    Medicare: Reporting Period (date from last assessment to current assessment): 2/15/24 - 3/12/24    Patient  verified yes     Visit #   Current / Total 6 16   Time   In / Out 1010 1050   Pain   In / Out 0 0   Subjective Functional Status/Changes: The only thing that is bothering me is the protruding screw on the outside of my ankle, tennis shoes hurt me and boots also bc it hits on that spot.      TREATMENT AREA =  Left ankle pain [M25.572]    OBJECTIVE         Therapeutic Procedures:    Tx Min Billable or 1:1 Min (if diff from Tx Min) Procedure, Rationale, Specifics   17 17 37063 Therapeutic Exercise (timed):  increase ROM, strength, coordination, balance, and proprioception to improve patient's ability to progress to PLOF and address remaining functional goals. (see flow sheet as applicable)     Details if applicable:       15 15 37625 Therapeutic Activity (timed):  use of dynamic activities replicating functional movements to increase ROM, strength, coordination, balance, and proprioception in order to improve patient's ability to progress to PLOF and address remaining functional goals.  (see flow sheet as applicable)     Details if applicable:  GOALS, FOTO , HEP   8 8 17103 Self Care/Home Management (timed):  improve patient knowledge

## 2024-03-12 NOTE — PROGRESS NOTES
Physical Therapy Discharge Instructions      [facility] - [address], [address]  [place] [address] [phone] fax    Patient: Jeniffer Mcnair  : 1945      Continue Home Exercise Program 1-2 times per day for 2-3 weeks, then decrease to 3-5 times per week      Continue with    [x] Ice  as needed PRN times per day     [x] Heat           Follow up with MD:     [] Upon completion of therapy     [x] As needed      Recommendations:     [x]   Return to activity with home program    []   Return to activity with the following modifications:       []Post Rehab Program    []Join Independent aquatic program     []Return to/join local gym      Additional Comments:        Neela Chaves, PT 3/12/2024 10:45 AM

## 2024-07-01 NOTE — TELEPHONE ENCOUNTER
Last Office Visit: 03/05/2024  Next Office Visit: 07/17/2024  Last Refill: losartan 07/07/2023; omeprazole 07/14/2023

## 2024-07-02 RX ORDER — OMEPRAZOLE 40 MG/1
40 CAPSULE, DELAYED RELEASE ORAL DAILY
Qty: 90 CAPSULE | Refills: 3 | Status: SHIPPED | OUTPATIENT
Start: 2024-07-02

## 2024-07-02 RX ORDER — LOSARTAN POTASSIUM 50 MG/1
TABLET ORAL
Qty: 90 TABLET | Refills: 3 | Status: SHIPPED | OUTPATIENT
Start: 2024-07-02

## 2024-07-10 ENCOUNTER — TRANSCRIBE ORDERS (OUTPATIENT)
Facility: HOSPITAL | Age: 79
End: 2024-07-10

## 2024-07-10 ENCOUNTER — LAB (OUTPATIENT)
Facility: CLINIC | Age: 79
End: 2024-07-10

## 2024-07-10 DIAGNOSIS — Z12.31 VISIT FOR SCREENING MAMMOGRAM: Primary | ICD-10-CM

## 2024-07-10 DIAGNOSIS — E78.5 HYPERLIPIDEMIA, UNSPECIFIED HYPERLIPIDEMIA TYPE: ICD-10-CM

## 2024-07-11 LAB
A/G RATIO: 2 RATIO (ref 1.1–2.6)
ALBUMIN: 4.6 G/DL (ref 3.5–5)
ALP BLD-CCNC: 87 U/L (ref 40–120)
ALT SERPL-CCNC: 19 U/L (ref 5–40)
ANION GAP SERPL CALCULATED.3IONS-SCNC: 10 MMOL/L (ref 3–15)
AST SERPL-CCNC: 20 U/L (ref 10–37)
BASOPHILS ABSOLUTE: 0.1 K/UL (ref 0–0.2)
BASOPHILS RELATIVE PERCENT: 1 % (ref 0–2)
BILIRUB SERPL-MCNC: 0.7 MG/DL (ref 0.2–1.2)
BUN BLDV-MCNC: 17 MG/DL (ref 6–22)
CALCIUM SERPL-MCNC: 9.7 MG/DL (ref 8.4–10.5)
CHLORIDE BLD-SCNC: 101 MMOL/L (ref 98–110)
CHOLESTEROL, TOTAL: 177 MG/DL (ref 110–200)
CHOLESTEROL/HDL RATIO: 2.7 (ref 0–5)
CO2: 28 MMOL/L (ref 20–32)
CREAT SERPL-MCNC: 1 MG/DL (ref 0.8–1.4)
EOSINOPHIL # BLD: 2 % (ref 0–6)
EOSINOPHILS ABSOLUTE: 0.1 K/UL (ref 0–0.5)
GFR, ESTIMATED: 54.4 ML/MIN/1.73 SQ.M.
GLOBULIN: 2.3 G/DL (ref 2–4)
GLUCOSE: 85 MG/DL (ref 70–99)
HCT VFR BLD CALC: 36.5 % (ref 35.1–48.3)
HDLC SERPL-MCNC: 66 MG/DL
HEMOGLOBIN: 11.6 G/DL (ref 11.7–16.1)
LDL CHOLESTEROL: 94 MG/DL (ref 50–99)
LDL/HDL RATIO: 1.4
LYMPHOCYTES # BLD: 31 % (ref 20–45)
LYMPHOCYTES ABSOLUTE: 1.6 K/UL (ref 1–4.8)
MCH RBC QN AUTO: 29 PG (ref 26–34)
MCHC RBC AUTO-ENTMCNC: 32 G/DL (ref 31–36)
MCV RBC AUTO: 92 FL (ref 80–99)
MONOCYTES ABSOLUTE: 0.4 K/UL (ref 0.1–1)
MONOCYTES: 8 % (ref 3–12)
NEUTROPHILS ABSOLUTE: 3 K/UL (ref 1.8–7.7)
NEUTROPHILS: 58 % (ref 40–75)
NON-HDL CHOLESTEROL: 111 MG/DL
PDW BLD-RTO: 13.9 % (ref 10–15.5)
PLATELET # BLD: 238 K/UL (ref 140–440)
PMV BLD AUTO: 10.7 FL (ref 9–13)
POTASSIUM SERPL-SCNC: 4.3 MMOL/L (ref 3.5–5.5)
RBC # BLD: 3.98 M/UL (ref 3.8–5.2)
SODIUM BLD-SCNC: 139 MMOL/L (ref 133–145)
TOTAL PROTEIN: 6.9 G/DL (ref 6.2–8.1)
TRIGL SERPL-MCNC: 82 MG/DL (ref 40–149)
VLDLC SERPL CALC-MCNC: 16 MG/DL (ref 8–30)
WBC # BLD: 5.1 K/UL (ref 4–11)

## 2024-07-11 NOTE — PROGRESS NOTES
79 y.o. female who presents for evaluation.    No cardiovascular complaints, bp has been controlled .  She has not ramped up her exercise yet    Happy with the results of the ankle surgery.  Dr Hewitt now has her on fosamax    No gi or gu complaints. Due for colo, wants to see Dr Colin    She saw Dr Luther for the longstanding anemia felt to be due to aocd    LAST MEDICARE WELLNESS EXAM: 8/13/14, 1/19/16, 3/7/17, 11/6/20, 1/24/22, 5/23/23, 7/17/24    Past Medical History:   Diagnosis Date    Allergic rhinitis     Anemia     lifelong;  low fe 9/22, saw Dr Strong/GI; Dr Luther 2023 AOCD    Anxiety     Carotid disease, bilateral (HCC)     BICA<50% (2/11); BICA<50% (9/14)    Cervical spondylosis     Chronic constipation     CKD (chronic kidney disease) 2022    US neg hydro 11/23; no proteinuria 11/23    Colon cancer (HCC) 1989    s/p partial colon resection    Colon polyp 09/06/2019    Dr Vazquez    COVID-19 virus infection 02/2024    paxlovid    FHx: heart disease     GERD (gastroesophageal reflux disease)     H/O cardiovascular stress test     negative, EF 78%, no wma  (1/09); NST low risk, tid 0.87 (12/22)    Hyperlipidemia     Hypertension     Insomnia 11/01/2016    Lumbar spinal stenosis     s/p decomp Dr Devine    Microscopic hematuria     neg cysto Dr Covarrubias 2013    Osteopenia     DEXA t score -0.6 spine, -1.9 hip (1/12); -0.6 spine, -1.8 hip (5/14); -1.9 spine, -2.2 hip FRAX 12/2.8 (11/20)    Osteoporosis 04/2024    Dr Hewitt; -1.5 spine, -2.9 wrist, -2.4 hip (11/23); fragility fx 2/24; fosamax    Vascular abnormality     RABI 1.21, LABI 1.22 (2/15)     Past Surgical History:   Procedure Laterality Date    ANKLE FRACTURE SURGERY Left 02/2023    Dr Duque    BUNIONECTOMY Bilateral     COLECTOMY      for colon cancer    COLONOSCOPY N/A 06/03/2016    neg (11/11); Dr Cleveland (6/3/16) neg; Dr Ochoa (9/6/19) polyp    CYSTOURETHROSCOPY  06/2013    Dr Covarrubias neg    HEMORRHOID SURGERY      HYSTERECTOMY (CERVIX STATUS UNKNOWN)

## 2024-07-14 SDOH — HEALTH STABILITY: PHYSICAL HEALTH: ON AVERAGE, HOW MANY MINUTES DO YOU ENGAGE IN EXERCISE AT THIS LEVEL?: 20 MIN

## 2024-07-14 SDOH — ECONOMIC STABILITY: FOOD INSECURITY: WITHIN THE PAST 12 MONTHS, THE FOOD YOU BOUGHT JUST DIDN'T LAST AND YOU DIDN'T HAVE MONEY TO GET MORE.: NEVER TRUE

## 2024-07-14 SDOH — ECONOMIC STABILITY: FOOD INSECURITY: WITHIN THE PAST 12 MONTHS, YOU WORRIED THAT YOUR FOOD WOULD RUN OUT BEFORE YOU GOT MONEY TO BUY MORE.: NEVER TRUE

## 2024-07-14 SDOH — HEALTH STABILITY: PHYSICAL HEALTH: ON AVERAGE, HOW MANY DAYS PER WEEK DO YOU ENGAGE IN MODERATE TO STRENUOUS EXERCISE (LIKE A BRISK WALK)?: 1 DAY

## 2024-07-14 SDOH — ECONOMIC STABILITY: INCOME INSECURITY: HOW HARD IS IT FOR YOU TO PAY FOR THE VERY BASICS LIKE FOOD, HOUSING, MEDICAL CARE, AND HEATING?: NOT HARD AT ALL

## 2024-07-14 ASSESSMENT — LIFESTYLE VARIABLES
HOW OFTEN DO YOU HAVE SIX OR MORE DRINKS ON ONE OCCASION: 1
HOW OFTEN DO YOU HAVE A DRINK CONTAINING ALCOHOL: 2
HOW OFTEN DO YOU HAVE A DRINK CONTAINING ALCOHOL: MONTHLY OR LESS
HOW MANY STANDARD DRINKS CONTAINING ALCOHOL DO YOU HAVE ON A TYPICAL DAY: 98
HOW MANY STANDARD DRINKS CONTAINING ALCOHOL DO YOU HAVE ON A TYPICAL DAY: PATIENT DECLINED

## 2024-07-14 ASSESSMENT — PATIENT HEALTH QUESTIONNAIRE - PHQ9
SUM OF ALL RESPONSES TO PHQ QUESTIONS 1-9: 0
SUM OF ALL RESPONSES TO PHQ QUESTIONS 1-9: 0
2. FEELING DOWN, DEPRESSED OR HOPELESS: NOT AT ALL
SUM OF ALL RESPONSES TO PHQ QUESTIONS 1-9: 0
SUM OF ALL RESPONSES TO PHQ QUESTIONS 1-9: 0
SUM OF ALL RESPONSES TO PHQ9 QUESTIONS 1 & 2: 0
1. LITTLE INTEREST OR PLEASURE IN DOING THINGS: NOT AT ALL

## 2024-07-17 ENCOUNTER — OFFICE VISIT (OUTPATIENT)
Facility: CLINIC | Age: 79
End: 2024-07-17
Payer: MEDICARE

## 2024-07-17 VITALS
OXYGEN SATURATION: 100 % | SYSTOLIC BLOOD PRESSURE: 136 MMHG | DIASTOLIC BLOOD PRESSURE: 62 MMHG | RESPIRATION RATE: 16 BRPM | HEART RATE: 68 BPM | WEIGHT: 118 LBS | TEMPERATURE: 98.2 F | BODY MASS INDEX: 22.28 KG/M2 | HEIGHT: 61 IN

## 2024-07-17 DIAGNOSIS — Z71.89 ADVANCED CARE PLANNING/COUNSELING DISCUSSION: ICD-10-CM

## 2024-07-17 DIAGNOSIS — M80.00XD AGE-RELATED OSTEOPOROSIS WITH CURRENT PATHOLOGICAL FRACTURE WITH ROUTINE HEALING: ICD-10-CM

## 2024-07-17 DIAGNOSIS — Z00.00 MEDICARE ANNUAL WELLNESS VISIT, SUBSEQUENT: Primary | ICD-10-CM

## 2024-07-17 DIAGNOSIS — Z85.038 HISTORY OF COLON CANCER: ICD-10-CM

## 2024-07-17 DIAGNOSIS — E78.5 HYPERLIPIDEMIA, UNSPECIFIED HYPERLIPIDEMIA TYPE: ICD-10-CM

## 2024-07-17 DIAGNOSIS — D64.9 CHRONIC ANEMIA: ICD-10-CM

## 2024-07-17 DIAGNOSIS — I10 PRIMARY HYPERTENSION: ICD-10-CM

## 2024-07-17 DIAGNOSIS — K21.9 GASTROESOPHAGEAL REFLUX DISEASE WITHOUT ESOPHAGITIS: ICD-10-CM

## 2024-07-17 DIAGNOSIS — N18.31 STAGE 3A CHRONIC KIDNEY DISEASE (HCC): ICD-10-CM

## 2024-07-17 PROCEDURE — 3078F DIAST BP <80 MM HG: CPT | Performed by: INTERNAL MEDICINE

## 2024-07-17 PROCEDURE — G0439 PPPS, SUBSEQ VISIT: HCPCS | Performed by: INTERNAL MEDICINE

## 2024-07-17 PROCEDURE — 1123F ACP DISCUSS/DSCN MKR DOCD: CPT | Performed by: INTERNAL MEDICINE

## 2024-07-17 PROCEDURE — 99497 ADVNCD CARE PLAN 30 MIN: CPT | Performed by: INTERNAL MEDICINE

## 2024-07-17 PROCEDURE — 99214 OFFICE O/P EST MOD 30 MIN: CPT | Performed by: INTERNAL MEDICINE

## 2024-07-17 PROCEDURE — 3075F SYST BP GE 130 - 139MM HG: CPT | Performed by: INTERNAL MEDICINE

## 2024-07-17 RX ORDER — ALENDRONATE SODIUM 70 MG/1
70 TABLET ORAL WEEKLY
COMMUNITY

## 2024-07-17 ASSESSMENT — PATIENT HEALTH QUESTIONNAIRE - PHQ9
SUM OF ALL RESPONSES TO PHQ QUESTIONS 1-9: 0
SUM OF ALL RESPONSES TO PHQ9 QUESTIONS 1 & 2: 0
SUM OF ALL RESPONSES TO PHQ QUESTIONS 1-9: 0
2. FEELING DOWN, DEPRESSED OR HOPELESS: NOT AT ALL
SUM OF ALL RESPONSES TO PHQ QUESTIONS 1-9: 0
1. LITTLE INTEREST OR PLEASURE IN DOING THINGS: NOT AT ALL
SUM OF ALL RESPONSES TO PHQ QUESTIONS 1-9: 0

## 2024-07-17 NOTE — PROGRESS NOTES
Jeniffer Mcnair presents today for   Chief Complaint   Patient presents with    Medicare AWV       \"Have you been to the ER, urgent care clinic since your last visit?  Hospitalized since your last visit?\"    NO    “Have you seen or consulted any other health care providers outside of [facility] since your last visit?”    YES - When: approximately 2  weeks ago.  Where and Why: Rheumatology, osteoporosis.

## 2024-07-17 NOTE — PROGRESS NOTES
Medicare Annual Wellness Visit    Jeniffer Mcnair is here for Medicare AWV    Assessment & Plan   History of colon cancer  -     External Referral To Gastroenterology  Primary hypertension  Advanced care planning/counseling discussion  Gastroesophageal reflux disease without esophagitis  Age-related osteoporosis with current pathological fracture with routine healing  Stage 3a chronic kidney disease (HCC)  -     CBC with Auto Differential; Future  -     Comprehensive Metabolic Panel; Future  Hyperlipidemia, unspecified hyperlipidemia type  -     Lipid Panel; Future  Chronic anemia  Medicare annual wellness visit, subsequent    Recommendations for Preventive Services Due: see orders and patient instructions/AVS.  Recommended screening schedule for the next 5-10 years is provided to the patient in written form: see Patient Instructions/AVS.     Return for Medicare Annual Wellness Visit in 1 year.     Subjective       Patient's complete Health Risk Assessment and screening values have been reviewed and are found in Flowsheets. The following problems were reviewed today and where indicated follow up appointments were made and/or referrals ordered.    No Positive Risk Factors identified today.                    Inactivity:  On average, how many days per week do you engage in moderate to strenuous exercise (like a brisk walk)?: 1 day (!) Abnormal  On average, how many minutes do you engage in exercise at this level?: 20 min  Interventions - Inactivity:  Patient declined any further interventions or treatment                   Objective   Vitals:    07/17/24 0959 07/17/24 1009   BP: (!) 150/65 136/62   Site: Left Upper Arm Right Upper Arm   Position: Sitting Sitting   Cuff Size: Medium Adult Medium Adult   Pulse: 68 68   Resp: 16    Temp: 98.2 °F (36.8 °C)    TempSrc: Temporal    SpO2: 100%    Weight: 53.5 kg (118 lb)    Height: 1.549 m (5' 1\")       Body mass index is 22.3 kg/m².                  No Known

## 2024-07-17 NOTE — PATIENT INSTRUCTIONS
9 Ways to Cut Back on Drinking  Maybe you've found yourself drinking more alcohol than you'd prefer. If you want to cut back, here are some ideas to try.    Think before you drink.  Do you really want a drink, or is it just a habit? If you're used to having a drink at a certain time, try doing something else then.     Look for substitutes.  Find some no-alcohol drinks that you enjoy, like flavored seltzer water, tea with honey, or tonic with a slice of lime. Or try alcohol-free beer or \"virgin\" cocktails (without the alcohol).     Drink more water.  Use water to quench your thirst. Drink a glass of water before you have any alcohol. Have another glass along with every drink or between drinks.     Shrink your drink.  For example, have a bottle of beer instead of a pint. Use a smaller glass for wine. Choose drinks with lower alcohol content (ABV%). Or use less liquor and more mixer in cocktails.     Slow down.  It's easy to drink quickly and without thinking about it. Pay attention, and make each drink last longer.     Do the math.  Total up how much you spend on alcohol each month. How much is that a year? If you cut back, what could you do with the money you save?     Take a break.  Choose a day or two each week when you won't drink at all. Notice how you feel on those days, physically and emotionally. How did you sleep? Do you feel better? Over time, add more break days.     Count calories.  Would you like to lose some weight? For some people that's a good motivator for cutting back. Figure out how many calories are in each drink. How many does that add up to in a day? In a week? In a month?     Practice saying no.  Be ready when someone offers you a drink. Try: \"Thanks, I've had enough.\" Or \"Thanks, but I'm cutting back.\" Or \"No, thanks. I feel better when I drink less.\"   Current as of: November 15, 2023  Content Version: 14.1  © 5063-6250 [employer]wise, [facility].   Care instructions adapted under license

## 2024-07-17 NOTE — ACP (ADVANCE CARE PLANNING)
Advance Care Planning     Advance Care Planning (ACP) Physician/NP/PA Conversation    Date of Conversation: 7/17/2024  Conducted with: Patient with Decision Making Capacity    Healthcare Decision Maker:      Primary Decision Maker: Joshua Mcnair - Spouse - [phone]    Click here to complete Healthcare Decision Makers including selection of the Healthcare Decision Maker Relationship (ie \"Primary\")  Today we documented Decision Maker(s) consistent with Legal Next of Kin hierarchy.    Care Preferences:    Hospitalization:  \"If your health worsens and it becomes clear that your chance of recovery is unlikely, what would be your preference regarding hospitalization?\"  The patient would prefer hospitalization.    Ventilation:  \"If you were unable to breath on your own and your chance of recovery was unlikely, what would be your preference about the use of a ventilator (breathing machine) if it was available to you?\"  The patient would desire the use of a ventilator.    Resuscitation:  \"In the event your heart stopped as a result of an underlying serious health condition, would you want attempts made to restart your heart, or would you prefer a natural death?\"  Yes, attempt to resuscitate.    ventilation preferences, hospitalization preferences, and resuscitation preferences    Conversation Outcomes / Follow-Up Plan:  ACP in process - information provided, considering goals and options  Reviewed DNR/DNI and patient elects Full Code (Attempt Resuscitation)    Length of Voluntary ACP Conversation in minutes:  16 minutes    SAULO GARCIA MD

## 2024-07-29 ENCOUNTER — HOSPITAL ENCOUNTER (OUTPATIENT)
Facility: HOSPITAL | Age: 79
Discharge: HOME | End: 2024-08-01
Attending: INTERNAL MEDICINE
Payer: MEDICARE

## 2024-07-29 VITALS — BODY MASS INDEX: 22.31 KG/M2 | HEIGHT: 61 IN

## 2024-07-29 DIAGNOSIS — Z12.31 VISIT FOR SCREENING MAMMOGRAM: ICD-10-CM

## 2024-07-29 PROCEDURE — 77063 BREAST TOMOSYNTHESIS BI: CPT

## 2024-09-01 ENCOUNTER — TELEPHONE (OUTPATIENT)
Facility: CLINIC | Age: 79
End: 2024-09-01

## 2024-09-01 DIAGNOSIS — J40 BRONCHITIS: Primary | ICD-10-CM

## 2024-09-01 RX ORDER — AZITHROMYCIN 250 MG/1
TABLET, FILM COATED ORAL
Qty: 6 TABLET | Refills: 0 | Status: SHIPPED | OUTPATIENT
Start: 2024-09-01 | End: 2024-09-11

## 2024-10-15 RX ORDER — IPRATROPIUM BROMIDE 21 UG/1
SPRAY, METERED NASAL
Qty: 30 ML | Refills: 3 | Status: SHIPPED | OUTPATIENT
Start: 2024-10-15

## 2025-01-13 NOTE — PROGRESS NOTES
Instructions for your procedure at [facility]      Today's Date: 1/13/2025      Patient's Name: Jeniffer Mcnair      Procedure Date: 01/22/2025        Please enter the main entrance of the hospital and check-in at the  located in the lobby.      Do NOT eat or drink anything, including candy, gum, or ice chips after midnight prior to your procedure, unless it is part of your prep.  Brush your teeth before coming to the hospital.You may swish with water, but do not swallow.  No smoking/Vaping/E-Cigarettes 24 hours prior to the day of procedure.  No alcohol 24 hours prior to the day of procedure.  No recreational drugs for one week prior to the day of procedure.  Bring Photo ID, Insurance information, and Co-pay if required on day of procedure.  Bring in pertinent legal documents, such as, Medical Power of , DNR, Advance Directive, etc.  Leave all other valuables, including money/purse, at home.  Remove jewelry, including ALL body piercings, nail polish, acrylic nails, and makeup (including mascara); no lotions, powders, deodorant, and/or perfume/cologne/after shave on the skin.  Glasses and dentures may be worn to the hospital.  They must be removed prior to procedure. Please bring case/container for glasses or dentures.  11. Contacts should not be worn on day of procedure.   12. Call the office ([phone]) if you have symptoms of a cold or illness within 24-48 hours prior to your procedure.   13. AN ADULT (relative or friend 18 years or older) MUST DRIVE YOU HOME AFTER YOUR PROCEDURE.   14. Please make arrangements for a responsible adult (18 years or older) to be with you for 24 hours after your procedure.   15. TWO VISITORS will be allowed in the waiting area during your procedure.       Special Instructions:      Bring list of CURRENT medications.  Follow instructions from the office regarding Bowel Prep, Vitamins and Blood Pressure/Heart medications.        If you

## 2025-01-14 ENCOUNTER — HOSPITAL ENCOUNTER (OUTPATIENT)
Facility: HOSPITAL | Age: 80
Setting detail: SPECIMEN
Discharge: HOME | End: 2025-01-17

## 2025-01-14 LAB — SENTARA SPECIMEN COLLECTION: NORMAL

## 2025-01-14 PROCEDURE — 99001 SPECIMEN HANDLING PT-LAB: CPT

## 2025-01-15 LAB
A/G RATIO: 2 RATIO (ref 1.1–2.6)
ALBUMIN: 4.9 G/DL (ref 3.5–5)
ALP BLD-CCNC: 86 U/L (ref 40–120)
ALT SERPL-CCNC: 24 U/L (ref 5–40)
ANION GAP SERPL CALCULATED.3IONS-SCNC: 13 MMOL/L (ref 3–15)
AST SERPL-CCNC: 25 U/L (ref 10–37)
BASOPHILS # BLD: 1 % (ref 0–2)
BASOPHILS ABSOLUTE: 0.1 K/UL (ref 0–0.2)
BILIRUB SERPL-MCNC: 0.9 MG/DL (ref 0.2–1.2)
BUN BLDV-MCNC: 16 MG/DL (ref 6–22)
CALCIUM SERPL-MCNC: 9.8 MG/DL (ref 8.4–10.5)
CHLORIDE BLD-SCNC: 104 MMOL/L (ref 98–110)
CHOLESTEROL, TOTAL: 195 MG/DL (ref 110–200)
CHOLESTEROL/HDL RATIO: 2.5 (ref 0–5)
CO2: 25 MMOL/L (ref 20–32)
CREAT SERPL-MCNC: 1.1 MG/DL (ref 0.8–1.4)
EOSINOPHIL # BLD: 2 % (ref 0–6)
EOSINOPHILS ABSOLUTE: 0.1 K/UL (ref 0–0.5)
GFR, ESTIMATED: 48.6 ML/MIN/1.73 SQ.M.
GLOBULIN: 2.5 G/DL (ref 2–4)
GLUCOSE: 85 MG/DL (ref 70–99)
HCT VFR BLD CALC: 40.3 % (ref 35.1–48.3)
HDLC SERPL-MCNC: 77 MG/DL
HEMOGLOBIN: 12.7 G/DL (ref 11.7–16.1)
LDL CHOLESTEROL: 103 MG/DL (ref 50–99)
LDL/HDL RATIO: 1.3
LYMPHOCYTES # BLD: 36 % (ref 20–45)
LYMPHOCYTES ABSOLUTE: 2.1 K/UL (ref 1–4.8)
MCH RBC QN AUTO: 29 PG (ref 26–34)
MCHC RBC AUTO-ENTMCNC: 32 G/DL (ref 31–36)
MCV RBC AUTO: 92 FL (ref 80–99)
MONOCYTES ABSOLUTE: 0.5 K/UL (ref 0.1–1)
MONOCYTES: 8 % (ref 3–12)
NEUTROPHILS ABSOLUTE: 3.1 K/UL (ref 1.8–7.7)
NEUTROPHILS SEGMENTED: 53 % (ref 40–75)
NON-HDL CHOLESTEROL: 118 MG/DL
PDW BLD-RTO: 13.5 % (ref 10–15.5)
PLATELET # BLD: 268 K/UL (ref 140–440)
PMV BLD AUTO: 10.9 FL (ref 9–13)
POTASSIUM SERPL-SCNC: 4.2 MMOL/L (ref 3.5–5.5)
RBC # BLD: 4.37 M/UL (ref 3.8–5.2)
SODIUM BLD-SCNC: 142 MMOL/L (ref 133–145)
TOTAL PROTEIN: 7.4 G/DL (ref 6.2–8.1)
TRIGL SERPL-MCNC: 75 MG/DL (ref 40–149)
VLDLC SERPL CALC-MCNC: 15 MG/DL (ref 8–30)
WBC # BLD: 5.8 K/UL (ref 4–11)

## 2025-01-17 DIAGNOSIS — N18.31 STAGE 3A CHRONIC KIDNEY DISEASE (HCC): ICD-10-CM

## 2025-01-17 DIAGNOSIS — E78.5 HYPERLIPIDEMIA, UNSPECIFIED HYPERLIPIDEMIA TYPE: ICD-10-CM

## 2025-01-21 ENCOUNTER — ANESTHESIA EVENT (OUTPATIENT)
Facility: HOSPITAL | Age: 80
End: 2025-01-21
Payer: MEDICARE

## 2025-01-22 ENCOUNTER — ANESTHESIA (OUTPATIENT)
Facility: HOSPITAL | Age: 80
End: 2025-01-22
Payer: MEDICARE

## 2025-01-22 ENCOUNTER — HOSPITAL ENCOUNTER (OUTPATIENT)
Facility: HOSPITAL | Age: 80
Setting detail: OUTPATIENT SURGERY
Discharge: HOME | End: 2025-01-22
Attending: INTERNAL MEDICINE | Admitting: INTERNAL MEDICINE
Payer: MEDICARE

## 2025-01-22 VITALS
HEART RATE: 71 BPM | RESPIRATION RATE: 17 BRPM | OXYGEN SATURATION: 100 % | SYSTOLIC BLOOD PRESSURE: 114 MMHG | WEIGHT: 114.1 LBS | HEIGHT: 60 IN | BODY MASS INDEX: 22.4 KG/M2 | TEMPERATURE: 97.8 F | DIASTOLIC BLOOD PRESSURE: 45 MMHG

## 2025-01-22 PROCEDURE — 3700000001 HC ADD 15 MINUTES (ANESTHESIA): Performed by: INTERNAL MEDICINE

## 2025-01-22 PROCEDURE — 7100000010 HC PHASE II RECOVERY - FIRST 15 MIN: Performed by: INTERNAL MEDICINE

## 2025-01-22 PROCEDURE — 2500000003 HC RX 250 WO HCPCS: Performed by: NURSE ANESTHETIST, CERTIFIED REGISTERED

## 2025-01-22 PROCEDURE — 6360000002 HC RX W HCPCS: Performed by: NURSE ANESTHETIST, CERTIFIED REGISTERED

## 2025-01-22 PROCEDURE — 7100000000 HC PACU RECOVERY - FIRST 15 MIN: Performed by: INTERNAL MEDICINE

## 2025-01-22 PROCEDURE — 3700000000 HC ANESTHESIA ATTENDED CARE: Performed by: INTERNAL MEDICINE

## 2025-01-22 PROCEDURE — 2709999900 HC NON-CHARGEABLE SUPPLY: Performed by: INTERNAL MEDICINE

## 2025-01-22 PROCEDURE — 3600007512: Performed by: INTERNAL MEDICINE

## 2025-01-22 PROCEDURE — 2580000003 HC RX 258: Performed by: NURSE ANESTHETIST, CERTIFIED REGISTERED

## 2025-01-22 PROCEDURE — 3600007502: Performed by: INTERNAL MEDICINE

## 2025-01-22 RX ORDER — LIDOCAINE HYDROCHLORIDE 10 MG/ML
1 INJECTION, SOLUTION EPIDURAL; INFILTRATION; INTRACAUDAL; PERINEURAL
Status: DISCONTINUED | OUTPATIENT
Start: 2025-01-22 | End: 2025-01-22 | Stop reason: HOSPADM

## 2025-01-22 RX ORDER — EPHEDRINE SULFATE/0.9% NACL/PF 50 MG/5 ML
SYRINGE (ML) INTRAVENOUS
Status: DISCONTINUED | OUTPATIENT
Start: 2025-01-22 | End: 2025-01-22 | Stop reason: SDUPTHER

## 2025-01-22 RX ORDER — SODIUM CHLORIDE 9 MG/ML
INJECTION, SOLUTION INTRAVENOUS PRN
Status: DISCONTINUED | OUTPATIENT
Start: 2025-01-22 | End: 2025-01-22 | Stop reason: HOSPADM

## 2025-01-22 RX ORDER — LIDOCAINE HYDROCHLORIDE 20 MG/ML
INJECTION, SOLUTION EPIDURAL; INFILTRATION; INTRACAUDAL; PERINEURAL
Status: DISCONTINUED | OUTPATIENT
Start: 2025-01-22 | End: 2025-01-22 | Stop reason: SDUPTHER

## 2025-01-22 RX ORDER — SODIUM CHLORIDE 0.9 % (FLUSH) 0.9 %
5-40 SYRINGE (ML) INJECTION EVERY 12 HOURS SCHEDULED
Status: DISCONTINUED | OUTPATIENT
Start: 2025-01-22 | End: 2025-01-22 | Stop reason: HOSPADM

## 2025-01-22 RX ORDER — SODIUM CHLORIDE 0.9 % (FLUSH) 0.9 %
5-40 SYRINGE (ML) INJECTION PRN
Status: DISCONTINUED | OUTPATIENT
Start: 2025-01-22 | End: 2025-01-22 | Stop reason: HOSPADM

## 2025-01-22 RX ORDER — PROPOFOL 10 MG/ML
INJECTION, EMULSION INTRAVENOUS
Status: DISCONTINUED | OUTPATIENT
Start: 2025-01-22 | End: 2025-01-22 | Stop reason: SDUPTHER

## 2025-01-22 RX ORDER — PANTOPRAZOLE SODIUM 40 MG/1
40 TABLET, DELAYED RELEASE ORAL DAILY
COMMUNITY

## 2025-01-22 RX ADMIN — PROPOFOL 30 MG: 10 INJECTION, EMULSION INTRAVENOUS at 09:59

## 2025-01-22 RX ADMIN — LIDOCAINE HYDROCHLORIDE 40 MG: 20 SOLUTION INTRAVENOUS at 09:46

## 2025-01-22 RX ADMIN — Medication 10 MG: at 09:52

## 2025-01-22 RX ADMIN — PROPOFOL 80 MG: 10 INJECTION, EMULSION INTRAVENOUS at 09:46

## 2025-01-22 RX ADMIN — SODIUM CHLORIDE: 9 INJECTION, SOLUTION INTRAVENOUS at 09:39

## 2025-01-22 RX ADMIN — PROPOFOL 30 MG: 10 INJECTION, EMULSION INTRAVENOUS at 10:03

## 2025-01-22 ASSESSMENT — PAIN - FUNCTIONAL ASSESSMENT
PAIN_FUNCTIONAL_ASSESSMENT: NONE - DENIES PAIN

## 2025-01-22 NOTE — ANESTHESIA POSTPROCEDURE EVALUATION
Department of Anesthesiology  Postprocedure Note    Patient: Jeniffer Mcnair  MRN: [id number]  YOB: 1945  Date of evaluation: 1/22/2025    Procedure Summary       Date: 01/22/25 Room / Location: [facility] ENDO 02 / [facility] ENDOSCOPY    Anesthesia Start: 0939 Anesthesia Stop: 1010    Procedure: COLONOSCOPY DIAGNOSTIC (Abdomen) Diagnosis:       Personal history of rectal cancer      Chronic constipation      Gastroesophageal reflux disease, unspecified whether esophagitis present      (Personal history of rectal cancer [Z85.048])      (Chronic constipation [K59.09])      (Gastroesophageal reflux disease, unspecified whether esophagitis present [K21.9])    Surgeons: Sophie Colin MD Responsible Provider: Morenita Lund MD    Anesthesia Type: MAC ASA Status: 3            Anesthesia Type: MAC    Michele Phase I: Michele Score: 9    Michele Phase II: Michele Score: 10    Anesthesia Post Evaluation    Patient location during evaluation: bedside  Patient participation: complete - patient participated  Airway patency: patent  Cardiovascular status: hemodynamically stable  Respiratory status: acceptable  Hydration status: stable    No notable events documented.

## 2025-01-22 NOTE — H&P
[employer]  [phone]      History and Physical    Patient: Jeniffer Mcnair MRN: [id number]  SSN: [id number]    YOB: 1945  Age: 80 y.o.  Sex: female      Subjective:      Jeniffer Mcnair is a 80 y.o. female who presents for increased risk CRCS. Pt has a personal history of colon polyps and history of rectal cancer. Denies symptoms or concerns. .     Past Medical History:   Diagnosis Date    Allergic rhinitis     Anemia     lifelong;  low fe 9/22, saw Dr Strong/GI; Dr Luther 2023 AOCD    Anxiety     Carotid disease, bilateral (HCC)     BICA<50% (2/11); BICA<50% (9/14)    Cervical spondylosis     Chronic constipation     CKD (chronic kidney disease) 2022    US neg hydro 11/23; no proteinuria 11/23    Colon cancer (HCC) 1989    s/p partial colon resection    Colon polyp 09/06/2019    Dr Vazquez    COVID-19 virus infection 02/2024    paxlovid    FHx: heart disease     GERD (gastroesophageal reflux disease)     H/O cardiovascular stress test     negative, EF 78%, no wma  (1/09); NST low risk, tid 0.87 (12/22)    Hyperlipidemia     Hypertension     Insomnia 11/01/2016    Lumbar spinal stenosis     s/p decomp Dr Devine    Microscopic hematuria     neg cysto Dr Covarrubias 2013    Osteopenia     DEXA t score -0.6 spine, -1.9 hip (1/12); -0.6 spine, -1.8 hip (5/14); -1.9 spine, -2.2 hip FRAX 12/2.8 (11/20)    Osteoporosis 04/2024    Dr Hewitt; -1.5 spine, -2.9 wrist, -2.4 hip (11/23); fragility fx 2/24; fosamax    Vascular abnormality     RABI 1.21, LABI 1.22 (2/15)     Past Surgical History:   Procedure Laterality Date    ANKLE FRACTURE SURGERY Left 02/2023    Dr Duque    BUNIONECTOMY Bilateral     COLECTOMY      for colon cancer    COLONOSCOPY N/A 06/03/2016    neg (11/11); Dr Cleveland (6/3/16) neg; Dr Ochoa (9/6/19) polyp    CYSTOURETHROSCOPY  06/2013    Dr Covarrubias neg    HEMORRHOID SURGERY      HYSTERECTOMY (CERVIX STATUS UNKNOWN)      LUMBAR FUSION  07/2015    Dr Devine L4-5 decomp and fusion

## 2025-01-22 NOTE — ANESTHESIA PRE PROCEDURE
Department of Anesthesiology  Preprocedure Note       Name:  Jeniffer Mcnair   Age:  80 y.o.  :  1945                                          MRN:  [id number]         Date:  2025      Surgeon: Surgeon(s):  Sophie Colin MD    Procedure: Procedure(s):  COLONOSCOPY DIAGNOSTIC    Medications prior to admission:   Prior to Admission medications    Medication Sig Start Date End Date Taking? Authorizing Provider   ipratropium (ATROVENT) 0.03 % nasal spray USE 2 SPRAYS IN EACH NOSTRIL EVERY 12 HOURS. 10/15/24   Alban Phan MD   alendronate (FOSAMAX) 70 MG tablet Take 1 tablet by mouth Once a week at 5 PM    Provider, MD Rosario   losartan (COZAAR) 50 MG tablet Take 1 tablet by mouth once daily 24   Alban Phan MD   atorvastatin (LIPITOR) 20 MG tablet Take 1 tablet by mouth daily 3/5/24   Alban Phan MD   amLODIPine (NORVASC) 2.5 MG tablet TAKE 1 TABLET BY MOUTH IN THE MORNING 24   Alban Phan MD   Multiple Vitamins-Minerals (ONE-A-DAY 50 PLUS PO) Take by mouth    Provider, MD Rosario   ALPRAZolam (XANAX) 0.25 MG tablet Take 1 tablet by mouth 3 times daily as needed. 21   Automatic Reconciliation, Ar   ammonium lactate (LAC-HYDRIN) 12 % lotion Apply topically as needed ceived the following from [facility]: Outside name: ammonium lactate (LAC-HYDRIN) 12 % lotion 21   Automatic Reconciliation, Ar   aspirin 81 MG EC tablet Take 1 tablet by mouth daily    Automatic Reconciliation, Ar   polyethylene glycol (GLYCOLAX) 17 GM/SCOOP powder Take 17 g by mouth daily 5/15/18   Automatic Reconciliation, Ar       Current medications:    No current facility-administered medications for this encounter.       Allergies:  No Known Allergies    Problem List:    Patient Active Problem List   Diagnosis Code   • Chronic anemia D64.9   • Hyperlipidemia E78.5   • Anxiety F41.9   • Advance directive on file Z78.9   • Primary hypertension I10   • History of

## 2025-01-24 RX ORDER — AMLODIPINE BESYLATE 2.5 MG/1
TABLET ORAL
Qty: 90 TABLET | Refills: 3 | Status: SHIPPED | OUTPATIENT
Start: 2025-01-24

## 2025-01-25 SDOH — ECONOMIC STABILITY: TRANSPORTATION INSECURITY
IN THE PAST 12 MONTHS, HAS THE LACK OF TRANSPORTATION KEPT YOU FROM MEDICAL APPOINTMENTS OR FROM GETTING MEDICATIONS?: NO

## 2025-01-25 SDOH — ECONOMIC STABILITY: FOOD INSECURITY: WITHIN THE PAST 12 MONTHS, THE FOOD YOU BOUGHT JUST DIDN'T LAST AND YOU DIDN'T HAVE MONEY TO GET MORE.: NEVER TRUE

## 2025-01-25 SDOH — ECONOMIC STABILITY: INCOME INSECURITY: IN THE LAST 12 MONTHS, WAS THERE A TIME WHEN YOU WERE NOT ABLE TO PAY THE MORTGAGE OR RENT ON TIME?: NO

## 2025-01-25 SDOH — ECONOMIC STABILITY: FOOD INSECURITY: WITHIN THE PAST 12 MONTHS, YOU WORRIED THAT YOUR FOOD WOULD RUN OUT BEFORE YOU GOT MONEY TO BUY MORE.: NEVER TRUE

## 2025-01-28 ENCOUNTER — OFFICE VISIT (OUTPATIENT)
Facility: CLINIC | Age: 80
End: 2025-01-28

## 2025-01-28 VITALS
HEIGHT: 60 IN | SYSTOLIC BLOOD PRESSURE: 118 MMHG | WEIGHT: 115 LBS | TEMPERATURE: 98.1 F | RESPIRATION RATE: 18 BRPM | HEART RATE: 67 BPM | DIASTOLIC BLOOD PRESSURE: 49 MMHG | OXYGEN SATURATION: 99 % | BODY MASS INDEX: 22.58 KG/M2

## 2025-01-28 DIAGNOSIS — N18.31 STAGE 3A CHRONIC KIDNEY DISEASE (HCC): ICD-10-CM

## 2025-01-28 DIAGNOSIS — I10 PRIMARY HYPERTENSION: Primary | ICD-10-CM

## 2025-01-28 DIAGNOSIS — E78.5 HYPERLIPIDEMIA, UNSPECIFIED HYPERLIPIDEMIA TYPE: ICD-10-CM

## 2025-01-28 DIAGNOSIS — K21.9 GASTROESOPHAGEAL REFLUX DISEASE WITHOUT ESOPHAGITIS: ICD-10-CM

## 2025-01-28 RX ORDER — IPRATROPIUM BROMIDE 21 UG/1
SPRAY, METERED NASAL
Qty: 30 ML | Refills: 3 | Status: SHIPPED | OUTPATIENT
Start: 2025-01-28

## 2025-01-28 ASSESSMENT — PATIENT HEALTH QUESTIONNAIRE - PHQ9
SUM OF ALL RESPONSES TO PHQ QUESTIONS 1-9: 0
2. FEELING DOWN, DEPRESSED OR HOPELESS: NOT AT ALL
SUM OF ALL RESPONSES TO PHQ9 QUESTIONS 1 & 2: 0
SUM OF ALL RESPONSES TO PHQ QUESTIONS 1-9: 0
1. LITTLE INTEREST OR PLEASURE IN DOING THINGS: NOT AT ALL
SUM OF ALL RESPONSES TO PHQ QUESTIONS 1-9: 0
SUM OF ALL RESPONSES TO PHQ QUESTIONS 1-9: 0

## 2025-01-28 NOTE — PROGRESS NOTES
Jeniffer Mcnair presents today for   Chief Complaint   Patient presents with    Follow-up Chronic Condition           \"Have you been to the ER, urgent care clinic since your last visit?  Hospitalized since your last visit?\"    NO    “Have you seen or consulted any other health care providers outside of [facility] since your last visit?”    NO            
(ATROVENT) 0.03 % nasal spray USE 2 SPRAYS IN EACH NOSTRIL EVERY 12 HOURS.    amLODIPine (NORVASC) 2.5 MG tablet TAKE 1 TABLET BY MOUTH IN THE MORNING    pantoprazole (PROTONIX) 40 MG tablet Take 1 tablet by mouth daily    alendronate (FOSAMAX) 70 MG tablet Take 1 tablet by mouth Once a week at 5 PM    losartan (COZAAR) 50 MG tablet Take 1 tablet by mouth once daily    atorvastatin (LIPITOR) 20 MG tablet Take 1 tablet by mouth daily    Multiple Vitamins-Minerals (ONE-A-DAY 50 PLUS PO) Take by mouth    ALPRAZolam (XANAX) 0.25 MG tablet Take 1 tablet by mouth 3 times daily as needed.    aspirin 81 MG EC tablet Take 1 tablet by mouth daily    polyethylene glycol (GLYCOLAX) 17 GM/SCOOP powder Take 17 g by mouth daily     No current facility-administered medications for this visit.     No Known Allergies    Vitals:    01/28/25 1359   BP: (!) 118/49   Pulse: 67   Resp: 18   Temp: 98.1 °F (36.7 °C)   TempSrc: Temporal   SpO2: 99%   Weight: 52.2 kg (115 lb)   Height: 1.524 m (5')   A&O x3  Affect is appropriate.  Mood stable  No apparent distress  Anicteric, no JVD, adenopathy or thyromegaly.   No carotid bruits or radiated murmur  Lungs clear to auscultation, no wheezes or rales  Heart showed regular rate and rhythm. No murmur, rubs, gallops  Abdomen soft nontender, no hepatosplenomegaly or masses.   Extremities without edema.     LABS  From 3/15 showed   gluc 81, cr 0.82, gfr>60, alt 8,   chol 175, tg 50, hdl 77, ldl-c 88,   wbc 5.0, hb 11.8, plt 194, tsh 3.54, vit d 40.2  From 9/15 showed                            uric 4.6  From 6/16 showed   gluc 81, cr 0.72, gfr>60, alt 16, chol 190, tg 98, hdl 67, ldl-c 103, wbc 5.8, hb 11.7, plt 236, tsh 2.24  From 6/17 showed   gluc 84, cr 0.94, gfr 59,  alt 25, chol 192, tg 66, hdl 75, ldl-c 104, wbc 5.3, hb 11.3, plt 201, tsh 1.30, vit d 35.1  From 3/18 showed   gluc 72, cr 0.94, gfr 58,  alt 30,           wbc 5.3, hb 11.2, plt 253, tsh 1.20, ft4 1.10  Form 4/19 showed   gluc 90,

## 2025-02-17 ENCOUNTER — TELEPHONE (OUTPATIENT)
Facility: CLINIC | Age: 80
End: 2025-02-17

## 2025-02-17 ENCOUNTER — HOSPITAL ENCOUNTER (EMERGENCY)
Facility: HOSPITAL | Age: 80
Discharge: HOME | End: 2025-02-17
Payer: MEDICARE

## 2025-02-17 VITALS
HEIGHT: 60 IN | BODY MASS INDEX: 22.58 KG/M2 | DIASTOLIC BLOOD PRESSURE: 83 MMHG | WEIGHT: 115 LBS | OXYGEN SATURATION: 100 % | HEART RATE: 66 BPM | RESPIRATION RATE: 14 BRPM | TEMPERATURE: 99.2 F | SYSTOLIC BLOOD PRESSURE: 160 MMHG

## 2025-02-17 DIAGNOSIS — I15.9 SECONDARY HYPERTENSION: Primary | ICD-10-CM

## 2025-02-17 DIAGNOSIS — R51.9 NONINTRACTABLE HEADACHE, UNSPECIFIED CHRONICITY PATTERN, UNSPECIFIED HEADACHE TYPE: ICD-10-CM

## 2025-02-17 LAB
ANION GAP SERPL CALC-SCNC: 6 MMOL/L (ref 3–18)
APPEARANCE UR: CLEAR
BASOPHILS # BLD: 0.04 K/UL (ref 0–0.1)
BASOPHILS NFR BLD: 0.7 % (ref 0–2)
BILIRUB UR QL: NEGATIVE
BUN SERPL-MCNC: 14 MG/DL (ref 7–18)
BUN/CREAT SERPL: 14 (ref 12–20)
CALCIUM SERPL-MCNC: 8.8 MG/DL (ref 8.5–10.1)
CHLORIDE SERPL-SCNC: 103 MMOL/L (ref 100–111)
CO2 SERPL-SCNC: 29 MMOL/L (ref 21–32)
COLOR UR: YELLOW
CREAT SERPL-MCNC: 1.03 MG/DL (ref 0.6–1.3)
DIFFERENTIAL METHOD BLD: ABNORMAL
EKG ATRIAL RATE: 77 BPM
EKG DIAGNOSIS: NORMAL
EKG P AXIS: -2 DEGREES
EKG P-R INTERVAL: 158 MS
EKG Q-T INTERVAL: 380 MS
EKG QRS DURATION: 70 MS
EKG QTC CALCULATION (BAZETT): 430 MS
EKG R AXIS: 46 DEGREES
EKG T AXIS: 43 DEGREES
EKG VENTRICULAR RATE: 77 BPM
EOSINOPHIL # BLD: 0.09 K/UL (ref 0–0.4)
EOSINOPHIL NFR BLD: 1.6 % (ref 0–5)
ERYTHROCYTE [DISTWIDTH] IN BLOOD BY AUTOMATED COUNT: 13.4 % (ref 11.6–14.5)
FLUAV RNA SPEC QL NAA+PROBE: NOT DETECTED
FLUBV RNA SPEC QL NAA+PROBE: NOT DETECTED
GLUCOSE SERPL-MCNC: 73 MG/DL (ref 74–99)
GLUCOSE UR STRIP.AUTO-MCNC: NEGATIVE MG/DL
HCT VFR BLD AUTO: 36.5 % (ref 35–45)
HGB BLD-MCNC: 12.1 G/DL (ref 12–16)
HGB UR QL STRIP: NEGATIVE
IMM GRANULOCYTES # BLD AUTO: 0.01 K/UL (ref 0–0.04)
IMM GRANULOCYTES NFR BLD AUTO: 0.2 % (ref 0–0.5)
KETONES UR QL STRIP.AUTO: NEGATIVE MG/DL
LEUKOCYTE ESTERASE UR QL STRIP.AUTO: NEGATIVE
LYMPHOCYTES # BLD: 1.65 K/UL (ref 0.9–3.6)
LYMPHOCYTES NFR BLD: 28.4 % (ref 21–52)
MCH RBC QN AUTO: 28.9 PG (ref 24–34)
MCHC RBC AUTO-ENTMCNC: 33.2 G/DL (ref 31–37)
MCV RBC AUTO: 87.1 FL (ref 78–100)
MONOCYTES # BLD: 0.41 K/UL (ref 0.05–1.2)
MONOCYTES NFR BLD: 7.1 % (ref 3–10)
NEUTS SEG # BLD: 3.6 K/UL (ref 1.8–8)
NEUTS SEG NFR BLD: 62 % (ref 40–73)
NITRITE UR QL STRIP.AUTO: NEGATIVE
NRBC # BLD: 0 K/UL (ref 0–0.01)
NRBC BLD-RTO: 0 PER 100 WBC
PH UR STRIP: 7.5 (ref 5–8)
PLATELET # BLD AUTO: 243 K/UL (ref 135–420)
PMV BLD AUTO: 10.1 FL (ref 9.2–11.8)
POTASSIUM SERPL-SCNC: 3.7 MMOL/L (ref 3.5–5.5)
PROT UR STRIP-MCNC: NEGATIVE MG/DL
RBC # BLD AUTO: 4.19 M/UL (ref 4.2–5.3)
SARS-COV-2 RNA RESP QL NAA+PROBE: NOT DETECTED
SODIUM SERPL-SCNC: 138 MMOL/L (ref 136–145)
SOURCE: NORMAL
SP GR UR REFRACTOMETRY: 1.01 (ref 1–1.03)
TROPONIN I SERPL HS-MCNC: <3 NG/L (ref 0–54)
TSH SERPL DL<=0.05 MIU/L-ACNC: 2.53 UIU/ML (ref 0.36–3.74)
UROBILINOGEN UR QL STRIP.AUTO: 0.2 EU/DL (ref 0.2–1)
WBC # BLD AUTO: 5.8 K/UL (ref 4.6–13.2)

## 2025-02-17 PROCEDURE — 93010 ELECTROCARDIOGRAM REPORT: CPT | Performed by: INTERNAL MEDICINE

## 2025-02-17 PROCEDURE — 80048 BASIC METABOLIC PNL TOTAL CA: CPT

## 2025-02-17 PROCEDURE — 84443 ASSAY THYROID STIM HORMONE: CPT

## 2025-02-17 PROCEDURE — 87636 SARSCOV2 & INF A&B AMP PRB: CPT

## 2025-02-17 PROCEDURE — 93005 ELECTROCARDIOGRAM TRACING: CPT | Performed by: STUDENT IN AN ORGANIZED HEALTH CARE EDUCATION/TRAINING PROGRAM

## 2025-02-17 PROCEDURE — 84484 ASSAY OF TROPONIN QUANT: CPT

## 2025-02-17 PROCEDURE — 81003 URINALYSIS AUTO W/O SCOPE: CPT

## 2025-02-17 PROCEDURE — 85025 COMPLETE CBC W/AUTO DIFF WBC: CPT

## 2025-02-17 PROCEDURE — 99284 EMERGENCY DEPT VISIT MOD MDM: CPT

## 2025-02-17 ASSESSMENT — ENCOUNTER SYMPTOMS
COUGH: 0
BACK PAIN: 0
SHORTNESS OF BREATH: 0

## 2025-02-17 ASSESSMENT — PAIN - FUNCTIONAL ASSESSMENT: PAIN_FUNCTIONAL_ASSESSMENT: 0-10

## 2025-02-17 ASSESSMENT — PAIN SCALES - GENERAL: PAINLEVEL_OUTOF10: 3

## 2025-02-17 ASSESSMENT — PAIN DESCRIPTION - LOCATION: LOCATION: HEAD

## 2025-02-17 NOTE — ED PROVIDER NOTES
[facility] EMERGENCY DEPARTMENT  EMERGENCY DEPARTMENT ENCOUNTER      Pt Name: Jeniffer Mcnair  MRN: [id number]  Birthdate 1945  Date of evaluation: 2/17/2025  Provider: LIBRA Victoria  11:57 AM    CHIEF COMPLAINT       Chief Complaint   Patient presents with    Hypertension         HISTORY OF PRESENT ILLNESS    Jeniffer Mcnair is a 80 y.o. female who presents to the emergency department with HTN.      80 y.o f with pmhx of HTN, GERD, Anxiety presents to ED for with elevated BP readings x 1 week. Last night /76. Pt reports she has HTN and is on medication. Pt takes meds. She reports intermittent headaches and palpations. She denies chest pain or SOB. Denies ankle/ calf swelling.         Nursing Notes were reviewed.    REVIEW OF SYSTEMS       Review of Systems   Constitutional:  Negative for chills, fatigue and fever.   HENT:  Negative for congestion and ear pain.    Eyes:  Negative for visual disturbance.   Respiratory:  Negative for cough, chest tightness and shortness of breath.    Cardiovascular:  Negative for chest pain and palpitations.   Gastrointestinal:  Negative for abdominal pain, nausea and vomiting.   Genitourinary:  Negative for difficulty urinating, dysuria and flank pain.   Musculoskeletal:  Negative for back pain, myalgias, neck pain and neck stiffness.   Skin:  Negative for pallor and wound.   Neurological:  Positive for headaches. Negative for dizziness, tremors, seizures, syncope, speech difficulty, weakness, light-headedness and numbness.   Hematological: Negative.    Psychiatric/Behavioral: Negative.         Except as noted above the remainder of the review of systems was reviewed and negative.       PAST MEDICAL HISTORY     Past Medical History:   Diagnosis Date    Allergic rhinitis     Anemia     lifelong;  low fe 9/22, saw Dr Strong/GI; Dr Luther 2023 AOCD    Anxiety     Carotid disease, bilateral     BICA<50% (2/11); BICA<50% (9/14)    Cervical spondylosis

## 2025-02-17 NOTE — DISCHARGE INSTRUCTIONS
Call PCP for follow-up in office.   Continue your home medications as prescribed.  Return to ED for new or worsening/concerning symptoms.

## 2025-02-17 NOTE — ED TRIAGE NOTES
Pt presents ambulatory to ED for c/o elevated BP. Last night /76. Pt reports she has HTN and is on medication. Pt takes meds. Pt endorses headache .

## 2025-02-17 NOTE — TELEPHONE ENCOUNTER
Patient contacted the office asking for asap appt with Dr Phan to discuss medications. She was seen at the ED today for her high bp:    Today was 160/83  2/16 was 218/76   2/15 was 180/72    I put her on for next available 02/24/2025. Please advise if she should be seen sooner than this

## 2025-02-17 NOTE — TELEPHONE ENCOUNTER
Patient called C/O blood pressure spikes the past few days, last night BP was 218/79 patients states feelings a \"flutter\" and \"bubble\" in her chest that makes her cough. Recommended evaluation in the ED. Patient will go to [facility].

## 2025-02-19 ENCOUNTER — PATIENT MESSAGE (OUTPATIENT)
Facility: CLINIC | Age: 80
End: 2025-02-19

## 2025-02-20 ASSESSMENT — ENCOUNTER SYMPTOMS
CHEST TIGHTNESS: 0
ABDOMINAL PAIN: 0
VOMITING: 0
NAUSEA: 0

## 2025-02-22 DIAGNOSIS — E78.5 HYPERLIPIDEMIA, UNSPECIFIED HYPERLIPIDEMIA TYPE: ICD-10-CM

## 2025-02-24 ENCOUNTER — OFFICE VISIT (OUTPATIENT)
Facility: CLINIC | Age: 80
End: 2025-02-24
Payer: MEDICARE

## 2025-02-24 VITALS
TEMPERATURE: 98.2 F | RESPIRATION RATE: 17 BRPM | OXYGEN SATURATION: 99 % | HEIGHT: 60 IN | HEART RATE: 68 BPM | DIASTOLIC BLOOD PRESSURE: 59 MMHG | BODY MASS INDEX: 22.85 KG/M2 | SYSTOLIC BLOOD PRESSURE: 132 MMHG | WEIGHT: 116.4 LBS

## 2025-02-24 DIAGNOSIS — I15.9 SECONDARY HYPERTENSION: Primary | ICD-10-CM

## 2025-02-24 PROCEDURE — 99214 OFFICE O/P EST MOD 30 MIN: CPT | Performed by: INTERNAL MEDICINE

## 2025-02-24 PROCEDURE — 3075F SYST BP GE 130 - 139MM HG: CPT | Performed by: INTERNAL MEDICINE

## 2025-02-24 PROCEDURE — 3078F DIAST BP <80 MM HG: CPT | Performed by: INTERNAL MEDICINE

## 2025-02-24 PROCEDURE — 1126F AMNT PAIN NOTED NONE PRSNT: CPT | Performed by: INTERNAL MEDICINE

## 2025-02-24 PROCEDURE — 1123F ACP DISCUSS/DSCN MKR DOCD: CPT | Performed by: INTERNAL MEDICINE

## 2025-02-24 RX ORDER — HYDROCORTISONE ACETATE PRAMOXINE HCL 2.5; 1 G/100G; G/100G
1 CREAM TOPICAL AS NEEDED
COMMUNITY

## 2025-02-24 RX ORDER — ATORVASTATIN CALCIUM 20 MG/1
20 TABLET, FILM COATED ORAL DAILY
Qty: 90 TABLET | Refills: 3 | Status: SHIPPED | OUTPATIENT
Start: 2025-02-24

## 2025-02-24 NOTE — PROGRESS NOTES
Jeniffer Mcnair presents today for   Chief Complaint   Patient presents with    Follow-up     ED; [facility]; HTN 02/17           \"Have you been to the ER, urgent care clinic since your last visit?  Hospitalized since your last visit?\"    YES - When: approximately 1  weeks ago.  Where and Why: [facility]; HTN.    “Have you seen or consulted any other health care providers outside of [facility] since your last visit?”    NO

## 2025-02-24 NOTE — PROGRESS NOTES
80 y.o. female who presents for evaluation.    Here to follow up after her ER visit 2/17/25.  We saw her late Jan and she reported that her bp had been running in good range.  She started following it more closely and she noted 181/73 the day before the ER visit.  She was having some palps and mild headache the next day, bp was 218/83 so she went to the ER.  No fever, focal neuro complaints, cp, sob, thyroid sx.  Labs incuding cbc, cmp, thyroid, trop, ua neg, EKG ok.  She went home and we reviewed reading since then showing 110 to 160s over 60 to 70s.  No new meds, stressors, foods, diet, etc.    Past Medical History:   Diagnosis Date    Allergic rhinitis     Anemia     lifelong;  low fe 9/22, saw Dr Strong/GI; Dr Luther 2023 AOCD    Anxiety     Carotid disease, bilateral     BICA<50% (2/11); BICA<50% (9/14)    Cervical spondylosis     Chronic constipation     CKD (chronic kidney disease) 2022    US neg hydro 11/23; no proteinuria 11/23    Colon cancer (HCC) 1989    s/p partial colon resection    Colon polyp 09/06/2019    Dr Vazquez    COVID-19 virus infection 02/2024    paxlovid    FHx: heart disease     GERD (gastroesophageal reflux disease)     H/O cardiovascular stress test     negative, EF 78%, no wma  (1/09); NST low risk, tid 0.87 (12/22)    Hyperlipidemia     Hypertension     Insomnia 11/01/2016    Lumbar spinal stenosis     s/p decomp Dr Devine    Microscopic hematuria     neg cysto Dr Covarrubias 2013    Osteopenia     DEXA t score -0.6 spine, -1.9 hip (1/12); -0.6 spine, -1.8 hip (5/14); -1.9 spine, -2.2 hip FRAX 12/2.8 (11/20)    Osteoporosis 04/2024    Dr Hewitt; -1.5 spine, -2.9 wrist, -2.4 hip (11/23); fragility fx 2/24; fosamax    Vascular abnormality     RABI 1.21, LABI 1.22 (2/15)     Current Outpatient Medications   Medication Sig    Hydrocort-Pramoxine, Perianal, (ANALPRAM-HC) 2.5-1 % rectal cream Place 1 Tube rectally as needed for Hemorrhoids    ipratropium (ATROVENT) 0.03 % nasal spray USE 2 SPRAYS IN

## 2025-03-05 ENCOUNTER — HOSPITAL ENCOUNTER (OUTPATIENT)
Facility: HOSPITAL | Age: 80
Discharge: HOME | End: 2025-03-07
Attending: INTERNAL MEDICINE
Payer: MEDICARE

## 2025-03-05 ENCOUNTER — HOSPITAL ENCOUNTER (OUTPATIENT)
Facility: HOSPITAL | Age: 80
Setting detail: SPECIMEN
Discharge: HOME | End: 2025-03-08

## 2025-03-05 DIAGNOSIS — I15.9 SECONDARY HYPERTENSION: ICD-10-CM

## 2025-03-05 LAB
SENTARA SPECIMEN COLLECTION: NORMAL
VAS AORTA DIST AP: 1.42 CM
VAS AORTA DIST PSV: 79.7 CM/S
VAS AORTA DIST TR: 1.66 CM
VAS AORTA MID AP: 1.78 CM
VAS AORTA MID PSV: 113.9 CM/S
VAS AORTA MID TRANS: 1.59 CM
VAS AORTA PROX AP: 1.57 CM
VAS AORTA PROX PSV: 78.2 CM/S
VAS AORTA PROX TR: 1.44 CM
VAS LEFT ARCUATE RENAL ARTERY EDV: 10.5 CM/S
VAS LEFT ARCUATE RENAL ARTERY PSV: 24.8 CM/S
VAS LEFT ARCUATE RENAL ARTERY RI: 0.58
VAS LEFT HILAR EDV: 25.4 CM/S
VAS LEFT HILAR PSV: 83.3 CM/S
VAS LEFT KIDNEY LENGTH: 9.2 CM
VAS LEFT KIDNEY WIDTH: 6.04 CM
VAS LEFT RENAL ARCUATE ACCEL TIME: 0.28 S
VAS LEFT RENAL DIST EDV: 25.4 CM/S
VAS LEFT RENAL DIST PSV: 113.1 CM/S
VAS LEFT RENAL DIST RAR: 0.99
VAS LEFT RENAL DIST RI: 0.78
VAS LEFT RENAL MIDDLE PARENCHYMA EDV: 16 CM/S
VAS LEFT RENAL MIDDLE PARENCHYMA PSV: 50 CM/S
VAS LEFT RENAL MIDDLE PARENCHYMA RI: 0.68
VAS LEFT RENAL RAR: 0.99
VAS LEFT RENAL SEGMENTAL ACCEL TIME: 0.27 S
VAS RIGHT ARCUATE RENAL ARTERY EDV: 5.9 CM/S
VAS RIGHT ARCUATE RENAL ARTERY PSV: 15.7 CM/S
VAS RIGHT ARCUATE RENAL ARTERY RI: 0.62
VAS RIGHT HILAR EDV: 27.7 CM/S
VAS RIGHT HILAR PSV: 93.5 CM/S
VAS RIGHT KIDNEY LENGTH: 8.07 CM
VAS RIGHT KIDNEY WIDTH: 3.85 CM
VAS RIGHT RENAL ARCUATE ACCEL TIME: 0.15 S
VAS RIGHT RENAL DIST EDV: 45.2 CM/S
VAS RIGHT RENAL DIST PSV: 113.2 CM/S
VAS RIGHT RENAL DIST RAR: 0.99
VAS RIGHT RENAL DIST RI: 0.6
VAS RIGHT RENAL MIDDLE PARENCHYMA EDV: 14.9 CM/S
VAS RIGHT RENAL MIDDLE PARENCHYMA PSV: 58.1 CM/S
VAS RIGHT RENAL MIDDLE PARENCHYMA RI: 0.74
VAS RIGHT RENAL RAR: 0.99
VAS RIGHT RENAL SEGMENTAL ACCEL TIME: 0.08 S

## 2025-03-05 PROCEDURE — 93975 VASCULAR STUDY: CPT

## 2025-03-05 PROCEDURE — 99001 SPECIMEN HANDLING PT-LAB: CPT

## 2025-03-12 ENCOUNTER — HOSPITAL ENCOUNTER (OUTPATIENT)
Facility: HOSPITAL | Age: 80
Setting detail: SPECIMEN
Discharge: HOME | End: 2025-03-15

## 2025-03-12 LAB — SENTARA SPECIMEN COLLECTION: NORMAL

## 2025-03-12 PROCEDURE — 99001 SPECIMEN HANDLING PT-LAB: CPT

## 2025-03-15 LAB
Lab: 46 MCG/24 H (ref 90–315)
METANEPHRINES TOTAL URINE: 280 MCG/24 H (ref 224–832)
NORMETANEPHRINE 24 HOUR URINE: 234 MCG/24 H (ref 122–676)
TOTAL VOLUME: 1695 ML

## 2025-03-17 LAB
ALDOSTERONE/RENIN RATIO: 4.4 RATIO (ref 0.9–28.9)
ALDOSTERONE: 3 NG/DL
PLASMA RENIN ACTIVITY: 0.68 NG/ML/H (ref 0.25–5.82)

## 2025-03-19 LAB
CORTISOL (UR), FREE: 41.6
CORTISOL 24H URINARY FREE: 30.8 MCG/24 H (ref 4–50)
CREATININE 24 HOUR URINE: 0.74 G/24 H (ref 0.5–2.15)
TOTAL VOLUME: 1695 ML

## 2025-05-19 ENCOUNTER — OFFICE VISIT (OUTPATIENT)
Age: 80
End: 2025-05-19
Payer: MEDICARE

## 2025-05-19 VITALS
BODY MASS INDEX: 22.38 KG/M2 | HEIGHT: 60 IN | SYSTOLIC BLOOD PRESSURE: 112 MMHG | WEIGHT: 114 LBS | HEART RATE: 67 BPM | DIASTOLIC BLOOD PRESSURE: 62 MMHG | OXYGEN SATURATION: 99 %

## 2025-05-19 DIAGNOSIS — I73.9 PERIPHERAL VASCULAR DISEASE: ICD-10-CM

## 2025-05-19 DIAGNOSIS — I10 PRIMARY HYPERTENSION: Primary | ICD-10-CM

## 2025-05-19 DIAGNOSIS — E78.5 HYPERLIPIDEMIA, UNSPECIFIED HYPERLIPIDEMIA TYPE: ICD-10-CM

## 2025-05-19 DIAGNOSIS — N18.31 STAGE 3A CHRONIC KIDNEY DISEASE (HCC): ICD-10-CM

## 2025-05-19 DIAGNOSIS — I73.9 CLAUDICATION OF LEFT LOWER EXTREMITY: ICD-10-CM

## 2025-05-19 DIAGNOSIS — K21.9 GASTROESOPHAGEAL REFLUX DISEASE WITHOUT ESOPHAGITIS: ICD-10-CM

## 2025-05-19 PROCEDURE — 1126F AMNT PAIN NOTED NONE PRSNT: CPT | Performed by: INTERNAL MEDICINE

## 2025-05-19 PROCEDURE — 99203 OFFICE O/P NEW LOW 30 MIN: CPT | Performed by: INTERNAL MEDICINE

## 2025-05-19 PROCEDURE — 1123F ACP DISCUSS/DSCN MKR DOCD: CPT | Performed by: INTERNAL MEDICINE

## 2025-05-19 PROCEDURE — 3074F SYST BP LT 130 MM HG: CPT | Performed by: INTERNAL MEDICINE

## 2025-05-19 PROCEDURE — 93000 ELECTROCARDIOGRAM COMPLETE: CPT | Performed by: INTERNAL MEDICINE

## 2025-05-19 PROCEDURE — 3078F DIAST BP <80 MM HG: CPT | Performed by: INTERNAL MEDICINE

## 2025-05-19 RX ORDER — LIFITEGRAST 50 MG/ML
SOLUTION/ DROPS OPHTHALMIC
COMMUNITY
Start: 2025-04-16

## 2025-05-19 NOTE — PROGRESS NOTES
Jeniffer Mcnair presents today for   Chief Complaint   Patient presents with    New Patient     Re-est. Care. Was last seen in the office 4-6-2018       Jeniffer Mcnair preferred language for health care discussion is english/other.    Is someone accompanying this pt? no    Is the patient using any DME equipment during OV? no    Depression Screening:  Depression: Not at risk (1/28/2025)    PHQ-2     PHQ-2 Score: 0        Learning Assessment:  No question data found.       Pt currently taking Anticoagulant therapy? no    Pt currently taking Antiplatelet therapy ? Aspirin 81 mg daily      Coordination of Care:  1. Have you been to the ER, urgent care clinic since your last visit? Hospitalized since your last visit? yes    2. Have you seen or consulted any other health care providers outside of the [facility] since your last visit? Include any pap smears or colon screening. no    
Position: Sitting, BP Cuff Size: Medium Adult)   Pulse 67   Ht 1.524 m (5')   Wt 51.7 kg (114 lb)   LMP  (LMP Unknown)   SpO2 99%   BMI 22.26 kg/m²     Objective:   Physical Exam  Constitutional:       Appearance: Normal appearance.   HENT:      Head: Normocephalic.      Nose: No congestion.      Mouth/Throat:      Pharynx: Oropharynx is clear.   Eyes:      Conjunctiva/sclera: Conjunctivae normal.      Pupils: Pupils are equal, round, and reactive to light.   Neck:      Vascular: No carotid bruit.   Cardiovascular:      Rate and Rhythm: Normal rate and regular rhythm.      Pulses: Normal pulses.      Heart sounds: Normal heart sounds.   Pulmonary:      Effort: Pulmonary effort is normal.      Breath sounds: Normal breath sounds.   Abdominal:      Palpations: Abdomen is soft.      Tenderness: There is no abdominal tenderness.   Musculoskeletal:         General: No tenderness. Normal range of motion.      Cervical back: No tenderness.      Right lower leg: No edema.      Left lower leg: No edema.   Skin:     General: Skin is warm and dry.   Neurological:      General: No focal deficit present.      Mental Status: She is alert and oriented to person, place, and time.   Psychiatric:         Mood and Affect: Mood normal.         Behavior: Behavior normal.         Assessment / Plan:     Encounter Diagnoses   Name Primary?    Primary hypertension Yes    Gastroesophageal reflux disease without esophagitis     Stage 3a chronic kidney disease (HCC)     Hyperlipidemia, unspecified hyperlipidemia type     Peripheral vascular disease     Claudication of left lower extremity        Discussion: This lady clinically appears to have developed some symptomatic obstructive arterial disease in the left lower extremity causing exertional claudication, however she gives no symptoms of any cardiac issues.  She apparently has already had an echocardiogram ordered through her family physician which is going to be done through the

## 2025-05-20 ASSESSMENT — ENCOUNTER SYMPTOMS
CONSTIPATION: 0
WHEEZING: 0
SHORTNESS OF BREATH: 0
ABDOMINAL PAIN: 0
COUGH: 0
BACK PAIN: 0
VOMITING: 0
NAUSEA: 0
TROUBLE SWALLOWING: 0
DIARRHEA: 0
BLOOD IN STOOL: 0

## 2025-05-22 ENCOUNTER — PATIENT MESSAGE (OUTPATIENT)
Facility: CLINIC | Age: 80
End: 2025-05-22

## 2025-05-22 ENCOUNTER — HOSPITAL ENCOUNTER (OUTPATIENT)
Facility: HOSPITAL | Age: 80
Discharge: HOME | End: 2025-05-24
Attending: STUDENT IN AN ORGANIZED HEALTH CARE EDUCATION/TRAINING PROGRAM
Payer: MEDICARE

## 2025-05-22 VITALS
DIASTOLIC BLOOD PRESSURE: 62 MMHG | SYSTOLIC BLOOD PRESSURE: 112 MMHG | BODY MASS INDEX: 22.38 KG/M2 | WEIGHT: 114 LBS | HEIGHT: 60 IN

## 2025-05-22 DIAGNOSIS — I51.3: ICD-10-CM

## 2025-05-22 DIAGNOSIS — I70.202 ATHEROSCLEROSIS OF LEFT LEG: ICD-10-CM

## 2025-05-22 LAB
ECHO AO ASC DIAM: 2.9 CM
ECHO AO ASCENDING AORTA INDEX: 1.97 CM/M2
ECHO AO ROOT DIAM: 2.7 CM
ECHO AO ROOT INDEX: 1.84 CM/M2
ECHO AV AREA PEAK VELOCITY: 2 CM2
ECHO AV AREA VTI: 2 CM2
ECHO AV AREA/BSA PEAK VELOCITY: 1.4 CM2/M2
ECHO AV AREA/BSA VTI: 1.4 CM2/M2
ECHO AV MEAN GRADIENT: 4 MMHG
ECHO AV MEAN VELOCITY: 1 M/S
ECHO AV PEAK GRADIENT: 9 MMHG
ECHO AV PEAK VELOCITY: 1.5 M/S
ECHO AV VELOCITY RATIO: 0.67
ECHO AV VTI: 33.9 CM
ECHO BSA: 1.48 M2
ECHO EST RA PRESSURE: 3 MMHG
ECHO LA VOL A-L A2C: 41 ML (ref 22–52)
ECHO LA VOL A-L A4C: 21 ML (ref 22–52)
ECHO LA VOL BP: 29 ML (ref 22–52)
ECHO LA VOL MOD A2C: 38 ML (ref 22–52)
ECHO LA VOL MOD A4C: 20 ML (ref 22–52)
ECHO LA VOL/BSA BIPLANE: 20 ML/M2 (ref 16–34)
ECHO LA VOLUME AREA LENGTH: 32 ML
ECHO LA VOLUME INDEX A-L A2C: 28 ML/M2 (ref 16–34)
ECHO LA VOLUME INDEX A-L A4C: 14 ML/M2 (ref 16–34)
ECHO LA VOLUME INDEX AREA LENGTH: 22 ML/M2 (ref 16–34)
ECHO LA VOLUME INDEX MOD A2C: 26 ML/M2 (ref 16–34)
ECHO LA VOLUME INDEX MOD A4C: 14 ML/M2 (ref 16–34)
ECHO LV E' LATERAL VELOCITY: 9.64 CM/S
ECHO LV E' SEPTAL VELOCITY: 7.89 CM/S
ECHO LV EDV A2C: 48 ML
ECHO LV EDV A4C: 59 ML
ECHO LV EDV BP: 54 ML (ref 56–104)
ECHO LV EDV INDEX A4C: 40 ML/M2
ECHO LV EDV INDEX BP: 37 ML/M2
ECHO LV EDV NDEX A2C: 33 ML/M2
ECHO LV EF PHYSICIAN: 60 %
ECHO LV EJECTION FRACTION A2C: 67 %
ECHO LV EJECTION FRACTION A4C: 60 %
ECHO LV EJECTION FRACTION BIPLANE: 64 % (ref 55–100)
ECHO LV ESV A2C: 16 ML
ECHO LV ESV A4C: 24 ML
ECHO LV ESV BP: 19 ML (ref 19–49)
ECHO LV ESV INDEX A2C: 11 ML/M2
ECHO LV ESV INDEX A4C: 16 ML/M2
ECHO LV ESV INDEX BP: 13 ML/M2
ECHO LV FRACTIONAL SHORTENING: 32 % (ref 28–44)
ECHO LV INTERNAL DIMENSION DIASTOLE INDEX: 2.59 CM/M2
ECHO LV INTERNAL DIMENSION DIASTOLIC: 3.8 CM (ref 3.9–5.3)
ECHO LV INTERNAL DIMENSION SYSTOLIC INDEX: 1.77 CM/M2
ECHO LV INTERNAL DIMENSION SYSTOLIC: 2.6 CM
ECHO LV IVSD: 0.7 CM (ref 0.6–0.9)
ECHO LV MASS 2D: 78.8 G (ref 67–162)
ECHO LV MASS INDEX 2D: 53.6 G/M2 (ref 43–95)
ECHO LV POSTERIOR WALL DIASTOLIC: 0.8 CM (ref 0.6–0.9)
ECHO LV RELATIVE WALL THICKNESS RATIO: 0.42
ECHO LVOT AREA: 2.8 CM2
ECHO LVOT AV VTI INDEX: 0.72
ECHO LVOT DIAM: 1.9 CM
ECHO LVOT MEAN GRADIENT: 2 MMHG
ECHO LVOT PEAK GRADIENT: 4 MMHG
ECHO LVOT PEAK VELOCITY: 1 M/S
ECHO LVOT STROKE VOLUME INDEX: 46.8 ML/M2
ECHO LVOT SV: 68.9 ML
ECHO LVOT VTI: 24.3 CM
ECHO MV A VELOCITY: 0.95 M/S
ECHO MV AREA VTI: 2.4 CM2
ECHO MV E DECELERATION TIME (DT): 272.2 MS
ECHO MV E VELOCITY: 0.76 M/S
ECHO MV E/A RATIO: 0.8
ECHO MV E/E' LATERAL: 7.88
ECHO MV E/E' RATIO (AVERAGED): 8.76
ECHO MV E/E' SEPTAL: 9.63
ECHO MV LVOT VTI INDEX: 1.2
ECHO MV MAX VELOCITY: 0.9 M/S
ECHO MV MEAN GRADIENT: 1 MMHG
ECHO MV MEAN VELOCITY: 0.6 M/S
ECHO MV PEAK GRADIENT: 3 MMHG
ECHO MV VTI: 29.2 CM
ECHO PV MAX VELOCITY: 0.8 M/S
ECHO PV PEAK GRADIENT: 3 MMHG
ECHO RA AREA 4C: 7.9 CM2
ECHO RA END SYSTOLIC VOLUME APICAL 4 CHAMBER INDEX BSA: 9 ML/M2
ECHO RA VOLUME AREA LENGTH APICAL 4 CHAMBER: 14 ML
ECHO RA VOLUME: 13 ML
ECHO RIGHT VENTRICULAR SYSTOLIC PRESSURE (RVSP): 28 MMHG
ECHO RV BASAL DIMENSION: 2.4 CM
ECHO RV MID DIMENSION: 1.5 CM
ECHO RV TAPSE: 2 CM (ref 1.7–?)
ECHO RVOT PEAK GRADIENT: 1 MMHG
ECHO RVOT PEAK VELOCITY: 0.5 M/S
ECHO TV REGURGITANT MAX VELOCITY: 2.48 M/S
ECHO TV REGURGITANT PEAK GRADIENT: 25 MMHG

## 2025-05-22 PROCEDURE — 93306 TTE W/DOPPLER COMPLETE: CPT

## 2025-05-22 RX ORDER — AMLODIPINE BESYLATE 2.5 MG/1
2.5 TABLET ORAL EVERY MORNING
Qty: 90 TABLET | Refills: 3 | Status: SHIPPED | OUTPATIENT
Start: 2025-05-22 | End: 2025-05-22 | Stop reason: SDUPTHER

## 2025-05-23 RX ORDER — AMLODIPINE BESYLATE 2.5 MG/1
2.5 TABLET ORAL 2 TIMES DAILY
Qty: 180 TABLET | Refills: 3 | Status: SHIPPED | OUTPATIENT
Start: 2025-05-23

## 2025-06-19 RX ORDER — LOSARTAN POTASSIUM 50 MG/1
50 TABLET ORAL DAILY
Qty: 90 TABLET | Refills: 3 | Status: SHIPPED | OUTPATIENT
Start: 2025-06-19

## 2025-07-09 ENCOUNTER — TRANSCRIBE ORDERS (OUTPATIENT)
Facility: HOSPITAL | Age: 80
End: 2025-07-09

## 2025-07-09 DIAGNOSIS — M81.0 AGE-RELATED OSTEOPOROSIS WITHOUT CURRENT PATHOLOGICAL FRACTURE: Primary | ICD-10-CM

## 2025-07-10 ENCOUNTER — TRANSCRIBE ORDERS (OUTPATIENT)
Facility: HOSPITAL | Age: 80
End: 2025-07-10

## 2025-07-10 DIAGNOSIS — Z12.31 VISIT FOR SCREENING MAMMOGRAM: Primary | ICD-10-CM

## 2025-07-18 ENCOUNTER — TELEPHONE (OUTPATIENT)
Facility: CLINIC | Age: 80
End: 2025-07-18

## 2025-07-18 DIAGNOSIS — I10 PRIMARY HYPERTENSION: ICD-10-CM

## 2025-07-18 DIAGNOSIS — E78.5 HYPERLIPIDEMIA, UNSPECIFIED HYPERLIPIDEMIA TYPE: Primary | ICD-10-CM

## 2025-07-23 ENCOUNTER — HOSPITAL ENCOUNTER (OUTPATIENT)
Facility: HOSPITAL | Age: 80
Setting detail: SPECIMEN
Discharge: HOME | End: 2025-07-26

## 2025-07-23 ENCOUNTER — LAB (OUTPATIENT)
Facility: CLINIC | Age: 80
End: 2025-07-23

## 2025-07-23 DIAGNOSIS — I10 PRIMARY HYPERTENSION: ICD-10-CM

## 2025-07-23 DIAGNOSIS — E78.5 HYPERLIPIDEMIA, UNSPECIFIED HYPERLIPIDEMIA TYPE: ICD-10-CM

## 2025-07-23 LAB — SENTARA SPECIMEN COLLECTION: NORMAL

## 2025-07-23 PROCEDURE — 99001 SPECIMEN HANDLING PT-LAB: CPT

## 2025-07-23 SDOH — HEALTH STABILITY: PHYSICAL HEALTH: ON AVERAGE, HOW MANY DAYS PER WEEK DO YOU ENGAGE IN MODERATE TO STRENUOUS EXERCISE (LIKE A BRISK WALK)?: 1 DAY

## 2025-07-23 SDOH — HEALTH STABILITY: PHYSICAL HEALTH: ON AVERAGE, HOW MANY MINUTES DO YOU ENGAGE IN EXERCISE AT THIS LEVEL?: 10 MIN

## 2025-07-23 ASSESSMENT — LIFESTYLE VARIABLES
HOW OFTEN DO YOU HAVE A DRINK CONTAINING ALCOHOL: 2-4 TIMES A MONTH
HOW OFTEN DO YOU HAVE SIX OR MORE DRINKS ON ONE OCCASION: 1
HOW MANY STANDARD DRINKS CONTAINING ALCOHOL DO YOU HAVE ON A TYPICAL DAY: 1
HOW MANY STANDARD DRINKS CONTAINING ALCOHOL DO YOU HAVE ON A TYPICAL DAY: 1 OR 2
HOW OFTEN DO YOU HAVE A DRINK CONTAINING ALCOHOL: 3

## 2025-07-23 ASSESSMENT — PATIENT HEALTH QUESTIONNAIRE - PHQ9
1. LITTLE INTEREST OR PLEASURE IN DOING THINGS: NOT AT ALL
SUM OF ALL RESPONSES TO PHQ QUESTIONS 1-9: 0
2. FEELING DOWN, DEPRESSED OR HOPELESS: NOT AT ALL
SUM OF ALL RESPONSES TO PHQ QUESTIONS 1-9: 0

## 2025-07-24 LAB
A/G RATIO: 1.6 RATIO (ref 1.1–2.6)
ALBUMIN: 4.6 G/DL (ref 3.5–5)
ALP BLD-CCNC: 87 U/L (ref 40–120)
ALT SERPL-CCNC: 22 U/L (ref 5–40)
ANION GAP SERPL CALCULATED.3IONS-SCNC: 16 MMOL/L (ref 3–15)
AST SERPL-CCNC: 27 U/L (ref 10–37)
BASOPHILS # BLD: 1 % (ref 0–2)
BASOPHILS ABSOLUTE: 0.1 K/UL (ref 0–0.2)
BILIRUB SERPL-MCNC: 1.2 MG/DL (ref 0.2–1.2)
BUN BLDV-MCNC: 17 MG/DL (ref 6–22)
CALCIUM SERPL-MCNC: 9.7 MG/DL (ref 8.4–10.5)
CHLORIDE BLD-SCNC: 101 MMOL/L (ref 98–110)
CHOLESTEROL, TOTAL: 155 MG/DL (ref 110–200)
CHOLESTEROL/HDL RATIO: 2.3 (ref 0–5)
CO2: 24 MMOL/L (ref 20–32)
CREAT SERPL-MCNC: 0.9 MG/DL (ref 0.8–1.4)
EOSINOPHIL # BLD: 3 % (ref 0–6)
EOSINOPHILS ABSOLUTE: 0.2 K/UL (ref 0–0.5)
GFR, ESTIMATED: 60.8 ML/MIN/1.73 SQ.M.
GLOBULIN: 2.8 G/DL (ref 2–4)
GLUCOSE: 80 MG/DL (ref 70–99)
HCT VFR BLD CALC: 34.6 % (ref 35.1–48.3)
HDLC SERPL-MCNC: 68 MG/DL
HEMOGLOBIN: 11.3 G/DL (ref 11.7–16.1)
LDL CHOLESTEROL: 73 MG/DL (ref 50–99)
LDL/HDL RATIO: 1.1
LYMPHOCYTES # BLD: 34 % (ref 20–45)
LYMPHOCYTES ABSOLUTE: 1.7 K/UL (ref 1–4.8)
MCH RBC QN AUTO: 30 PG (ref 26–34)
MCHC RBC AUTO-ENTMCNC: 33 G/DL (ref 31–36)
MCV RBC AUTO: 90 FL (ref 80–99)
MONOCYTES ABSOLUTE: 0.5 K/UL (ref 0.1–1)
MONOCYTES: 10 % (ref 3–12)
NEUTROPHILS ABSOLUTE: 2.5 K/UL (ref 1.8–7.7)
NEUTROPHILS SEGMENTED: 51 % (ref 40–75)
NON-HDL CHOLESTEROL: 87 MG/DL
PDW BLD-RTO: 13.9 % (ref 10–15.5)
PLATELET # BLD: 198 K/UL (ref 140–440)
PMV BLD AUTO: 10.7 FL (ref 9–13)
POTASSIUM SERPL-SCNC: 4.1 MMOL/L (ref 3.5–5.5)
RBC # BLD: 3.83 M/UL (ref 3.8–5.2)
SODIUM BLD-SCNC: 141 MMOL/L (ref 133–145)
TOTAL PROTEIN: 7.4 G/DL (ref 6.2–8.1)
TRIGL SERPL-MCNC: 69 MG/DL (ref 40–149)
VLDLC SERPL CALC-MCNC: 14 MG/DL (ref 8–30)
WBC # BLD: 4.9 K/UL (ref 4–11)

## 2025-07-30 ENCOUNTER — OFFICE VISIT (OUTPATIENT)
Facility: CLINIC | Age: 80
End: 2025-07-30
Payer: MEDICARE

## 2025-07-30 VITALS
SYSTOLIC BLOOD PRESSURE: 124 MMHG | HEIGHT: 60 IN | WEIGHT: 115 LBS | TEMPERATURE: 97.8 F | OXYGEN SATURATION: 100 % | RESPIRATION RATE: 16 BRPM | BODY MASS INDEX: 22.58 KG/M2 | DIASTOLIC BLOOD PRESSURE: 61 MMHG | HEART RATE: 78 BPM

## 2025-07-30 DIAGNOSIS — D64.9 CHRONIC ANEMIA: ICD-10-CM

## 2025-07-30 DIAGNOSIS — Z00.00 MEDICARE ANNUAL WELLNESS VISIT, SUBSEQUENT: Primary | ICD-10-CM

## 2025-07-30 DIAGNOSIS — N18.31 STAGE 3A CHRONIC KIDNEY DISEASE (HCC): ICD-10-CM

## 2025-07-30 DIAGNOSIS — E78.5 HYPERLIPIDEMIA, UNSPECIFIED HYPERLIPIDEMIA TYPE: ICD-10-CM

## 2025-07-30 DIAGNOSIS — K21.9 GASTROESOPHAGEAL REFLUX DISEASE WITHOUT ESOPHAGITIS: ICD-10-CM

## 2025-07-30 DIAGNOSIS — I10 PRIMARY HYPERTENSION: ICD-10-CM

## 2025-07-30 DIAGNOSIS — Z71.89 ADVANCED CARE PLANNING/COUNSELING DISCUSSION: ICD-10-CM

## 2025-07-30 PROCEDURE — 3074F SYST BP LT 130 MM HG: CPT | Performed by: INTERNAL MEDICINE

## 2025-07-30 PROCEDURE — 3078F DIAST BP <80 MM HG: CPT | Performed by: INTERNAL MEDICINE

## 2025-07-30 PROCEDURE — 99214 OFFICE O/P EST MOD 30 MIN: CPT | Performed by: INTERNAL MEDICINE

## 2025-07-30 PROCEDURE — 1159F MED LIST DOCD IN RCRD: CPT | Performed by: INTERNAL MEDICINE

## 2025-07-30 PROCEDURE — 99497 ADVNCD CARE PLAN 30 MIN: CPT | Performed by: INTERNAL MEDICINE

## 2025-07-30 PROCEDURE — 1123F ACP DISCUSS/DSCN MKR DOCD: CPT | Performed by: INTERNAL MEDICINE

## 2025-07-30 PROCEDURE — G0439 PPPS, SUBSEQ VISIT: HCPCS | Performed by: INTERNAL MEDICINE

## 2025-07-30 RX ORDER — ATORVASTATIN CALCIUM 40 MG/1
40 TABLET, FILM COATED ORAL NIGHTLY
COMMUNITY
Start: 2025-07-20 | End: 2025-07-30

## 2025-07-30 RX ORDER — CALCIUM CARBONATE/VITAMIN D3 500 MG-10
1 TABLET ORAL DAILY
COMMUNITY
Start: 2025-04-15

## 2025-07-30 RX ORDER — ATORVASTATIN CALCIUM 80 MG/1
80 TABLET, FILM COATED ORAL DAILY
Qty: 90 TABLET | Refills: 3 | Status: SHIPPED | OUTPATIENT
Start: 2025-07-30

## 2025-08-12 ENCOUNTER — OFFICE VISIT (OUTPATIENT)
Age: 80
End: 2025-08-12
Payer: MEDICARE

## 2025-08-12 VITALS
DIASTOLIC BLOOD PRESSURE: 80 MMHG | HEIGHT: 60 IN | BODY MASS INDEX: 22.58 KG/M2 | OXYGEN SATURATION: 98 % | WEIGHT: 115 LBS | SYSTOLIC BLOOD PRESSURE: 144 MMHG | HEART RATE: 66 BPM

## 2025-08-12 DIAGNOSIS — I49.8 FLUTTERING HEART: ICD-10-CM

## 2025-08-12 DIAGNOSIS — K21.9 GASTROESOPHAGEAL REFLUX DISEASE WITHOUT ESOPHAGITIS: ICD-10-CM

## 2025-08-12 DIAGNOSIS — I73.9 CLAUDICATION OF LEFT LOWER EXTREMITY: ICD-10-CM

## 2025-08-12 DIAGNOSIS — E78.5 HYPERLIPIDEMIA, UNSPECIFIED HYPERLIPIDEMIA TYPE: ICD-10-CM

## 2025-08-12 DIAGNOSIS — I73.9 PERIPHERAL VASCULAR DISEASE: ICD-10-CM

## 2025-08-12 DIAGNOSIS — I10 PRIMARY HYPERTENSION: Primary | ICD-10-CM

## 2025-08-12 DIAGNOSIS — N18.31 STAGE 3A CHRONIC KIDNEY DISEASE (HCC): ICD-10-CM

## 2025-08-12 PROCEDURE — 1123F ACP DISCUSS/DSCN MKR DOCD: CPT | Performed by: INTERNAL MEDICINE

## 2025-08-12 PROCEDURE — 1126F AMNT PAIN NOTED NONE PRSNT: CPT | Performed by: INTERNAL MEDICINE

## 2025-08-12 PROCEDURE — 93000 ELECTROCARDIOGRAM COMPLETE: CPT | Performed by: INTERNAL MEDICINE

## 2025-08-12 PROCEDURE — 99214 OFFICE O/P EST MOD 30 MIN: CPT | Performed by: INTERNAL MEDICINE

## 2025-08-12 PROCEDURE — 1160F RVW MEDS BY RX/DR IN RCRD: CPT | Performed by: INTERNAL MEDICINE

## 2025-08-12 PROCEDURE — 1159F MED LIST DOCD IN RCRD: CPT | Performed by: INTERNAL MEDICINE

## 2025-08-12 PROCEDURE — 3079F DIAST BP 80-89 MM HG: CPT | Performed by: INTERNAL MEDICINE

## 2025-08-12 PROCEDURE — 3077F SYST BP >= 140 MM HG: CPT | Performed by: INTERNAL MEDICINE

## 2025-08-15 ENCOUNTER — HOSPITAL ENCOUNTER (OUTPATIENT)
Facility: HOSPITAL | Age: 80
Discharge: HOME | End: 2025-08-18
Attending: INTERNAL MEDICINE
Payer: MEDICARE

## 2025-08-15 VITALS — WEIGHT: 115.08 LBS | BODY MASS INDEX: 22.59 KG/M2 | HEIGHT: 60 IN

## 2025-08-15 DIAGNOSIS — Z12.31 VISIT FOR SCREENING MAMMOGRAM: ICD-10-CM

## 2025-08-15 PROCEDURE — 77063 BREAST TOMOSYNTHESIS BI: CPT

## (undated) DEVICE — GOWN PLASTIC FILM THMBHKS UNIV BLUE: Brand: CARDINAL HEALTH

## (undated) DEVICE — YANKAUER,SMOOTH HANDLE,HIGH CAPACITY: Brand: MEDLINE INDUSTRIES, INC.

## (undated) DEVICE — SOLUTION IRRIG 1000ML H2O STRL BLT

## (undated) DEVICE — SYRINGE 20ML LL S/C 50

## (undated) DEVICE — SNARE POLYP M W27MMXL240CM OVL STIFF DISP CAPTIVATOR

## (undated) DEVICE — GAUZE,SPONGE,4"X4",16PLY,STRL,LF,10/TRAY: Brand: MEDLINE

## (undated) DEVICE — ENDOSCOPY PUMP TUBING/ CAP SET: Brand: ERBE

## (undated) DEVICE — UNDERPAD INCONT W23XL36IN STD BLU POLYPR BK FLUF SFT

## (undated) DEVICE — CANNULA NSL AD TBNG L14FT STD PVC O2 CRV CONN NONFLARED NSL

## (undated) DEVICE — SYRINGE MED 25GA 3ML L5/8IN SUBQ PLAS W/ DETACH NDL SFTY

## (undated) DEVICE — SYRINGE MED 10ML LUERLOCK TIP W/O SFTY DISP

## (undated) DEVICE — SYRINGE MED 50ML LUERSLIP TIP

## (undated) DEVICE — CANNULA ORIG TL CLR W FOAM CUSHIONS AND 14FT SUPL TB 3 CHN

## (undated) DEVICE — CATHETER SUCT TR FL TIP 14FR W/ O CTRL

## (undated) DEVICE — FORCEPS BX L240CM JAW DIA2.8MM L CAP W/ NDL MIC MESH TOOTH

## (undated) DEVICE — MEDI-VAC NON-CONDUCTIVE SUCTION TUBING: Brand: CARDINAL HEALTH

## (undated) DEVICE — LINER SUCT CANSTR 3000CC PLAS SFT PRE ASSEMB W/OUT TBNG W/